# Patient Record
Sex: MALE | Race: WHITE | NOT HISPANIC OR LATINO | Employment: FULL TIME | URBAN - METROPOLITAN AREA
[De-identification: names, ages, dates, MRNs, and addresses within clinical notes are randomized per-mention and may not be internally consistent; named-entity substitution may affect disease eponyms.]

---

## 2017-01-16 ENCOUNTER — TRANSFERRED RECORDS (OUTPATIENT)
Dept: HEALTH INFORMATION MANAGEMENT | Facility: CLINIC | Age: 57
End: 2017-01-16

## 2017-03-20 ENCOUNTER — TELEPHONE (OUTPATIENT)
Dept: RHEUMATOLOGY | Facility: CLINIC | Age: 57
End: 2017-03-20

## 2017-03-20 NOTE — TELEPHONE ENCOUNTER
Patient wants to know why there is an appt. Scheduled for him today? He did not know about it and he missed it. Please advise. Ok to leave a message.  Call cell if calling today after 2:30.  Patient was told he did not need to come in every 3 mths. Just do labs.

## 2017-03-20 NOTE — TELEPHONE ENCOUNTER
Rheumatology Team:    Mr. Monroy did not come to his scheduled appointment today.  Please call Mr. Monroy to see if he would like to reschedule.      Toxicity monitoring labs are not up to date and should be done as soon as possible; orders are in the system and he may have them done at any [place] lab.  Please encourage him to do this soon; please offer to schedule the lab appointment.    Eduard Mazariegos MD  3/20/2017 4:57 PM

## 2017-03-21 DIAGNOSIS — M05.79 SEROPOSITIVE RHEUMATOID ARTHRITIS OF MULTIPLE SITES (H): ICD-10-CM

## 2017-03-21 DIAGNOSIS — N20.0 KIDNEY STONES: Primary | ICD-10-CM

## 2017-03-21 LAB
ALBUMIN SERPL-MCNC: 4.1 G/DL (ref 3.4–5)
ALBUMIN SERPL-MCNC: 4.2 G/DL (ref 3.4–5)
ALP SERPL-CCNC: 79 U/L (ref 40–150)
ALT SERPL W P-5'-P-CCNC: 28 U/L (ref 0–70)
ANION GAP SERPL CALCULATED.3IONS-SCNC: 9 MMOL/L (ref 3–14)
AST SERPL W P-5'-P-CCNC: 15 U/L (ref 0–45)
BASOPHILS # BLD AUTO: 0 10E9/L (ref 0–0.2)
BASOPHILS NFR BLD AUTO: 0.3 %
BILIRUB DIRECT SERPL-MCNC: <0.1 MG/DL (ref 0–0.2)
BILIRUB SERPL-MCNC: 0.4 MG/DL (ref 0.2–1.3)
BUN SERPL-MCNC: 15 MG/DL (ref 7–30)
CALCIUM SERPL-MCNC: 9.2 MG/DL (ref 8.5–10.1)
CHLORIDE SERPL-SCNC: 104 MMOL/L (ref 94–109)
CO2 SERPL-SCNC: 26 MMOL/L (ref 20–32)
CREAT SERPL-MCNC: 0.85 MG/DL (ref 0.66–1.25)
CREAT SERPL-MCNC: 0.86 MG/DL (ref 0.66–1.25)
CRP SERPL-MCNC: 3.5 MG/L (ref 0–8)
DIFFERENTIAL METHOD BLD: NORMAL
EOSINOPHIL # BLD AUTO: 0 10E9/L (ref 0–0.7)
EOSINOPHIL NFR BLD AUTO: 0.4 %
ERYTHROCYTE [DISTWIDTH] IN BLOOD BY AUTOMATED COUNT: 12 % (ref 10–15)
ERYTHROCYTE [SEDIMENTATION RATE] IN BLOOD BY WESTERGREN METHOD: 6 MM/H (ref 0–20)
GFR SERPL CREATININE-BSD FRML MDRD: ABNORMAL ML/MIN/1.7M2
GFR SERPL CREATININE-BSD FRML MDRD: NORMAL ML/MIN/1.7M2
GLUCOSE SERPL-MCNC: 103 MG/DL (ref 70–99)
HCT VFR BLD AUTO: 41.9 % (ref 40–53)
HGB BLD-MCNC: 14.7 G/DL (ref 13.3–17.7)
LYMPHOCYTES # BLD AUTO: 1.6 10E9/L (ref 0.8–5.3)
LYMPHOCYTES NFR BLD AUTO: 20.8 %
MCH RBC QN AUTO: 32.5 PG (ref 26.5–33)
MCHC RBC AUTO-ENTMCNC: 35.1 G/DL (ref 31.5–36.5)
MCV RBC AUTO: 93 FL (ref 78–100)
MONOCYTES # BLD AUTO: 0.5 10E9/L (ref 0–1.3)
MONOCYTES NFR BLD AUTO: 6.4 %
NEUTROPHILS # BLD AUTO: 5.6 10E9/L (ref 1.6–8.3)
NEUTROPHILS NFR BLD AUTO: 72.1 %
PHOSPHATE SERPL-MCNC: 2.6 MG/DL (ref 2.5–4.5)
PLATELET # BLD AUTO: 225 10E9/L (ref 150–450)
POTASSIUM SERPL-SCNC: 4 MMOL/L (ref 3.4–5.3)
PROT SERPL-MCNC: 6.9 G/DL (ref 6.8–8.8)
RBC # BLD AUTO: 4.53 10E12/L (ref 4.4–5.9)
SODIUM SERPL-SCNC: 139 MMOL/L (ref 133–144)
WBC # BLD AUTO: 7.8 10E9/L (ref 4–11)

## 2017-03-21 PROCEDURE — 80069 RENAL FUNCTION PANEL: CPT | Performed by: PHYSICIAN ASSISTANT

## 2017-03-21 PROCEDURE — 82565 ASSAY OF CREATININE: CPT | Performed by: INTERNAL MEDICINE

## 2017-03-21 PROCEDURE — 85025 COMPLETE CBC W/AUTO DIFF WBC: CPT | Performed by: INTERNAL MEDICINE

## 2017-03-21 PROCEDURE — 86140 C-REACTIVE PROTEIN: CPT | Performed by: INTERNAL MEDICINE

## 2017-03-21 PROCEDURE — 85652 RBC SED RATE AUTOMATED: CPT | Performed by: INTERNAL MEDICINE

## 2017-03-21 PROCEDURE — 36415 COLL VENOUS BLD VENIPUNCTURE: CPT | Performed by: INTERNAL MEDICINE

## 2017-03-21 PROCEDURE — 80076 HEPATIC FUNCTION PANEL: CPT | Performed by: INTERNAL MEDICINE

## 2017-03-21 NOTE — TELEPHONE ENCOUNTER
Patient wants to know why there is an appt. Scheduled for him today? He did not know about it and he missed it. Please advise. Ok to leave a message.  Call cell if calling today after 2:30. Patient was told he did not need to come in every 3 mths. Just do labs.    RN attempted to reach patient in regards to no show appointment.  Appointment was made at his last OV for his f/u.  NO VM on patient phone.      Please attempt again later.   Margy Kamara RN

## 2017-03-21 NOTE — TELEPHONE ENCOUNTER
Copying into encounter with same concern/topic. Closing encounter.  See 3/20/17 encounter.   Margy Kamara RN

## 2017-03-22 DIAGNOSIS — M05.79 SEROPOSITIVE RHEUMATOID ARTHRITIS OF MULTIPLE SITES (H): ICD-10-CM

## 2017-03-22 RX ORDER — LEFLUNOMIDE 20 MG/1
20 TABLET ORAL DAILY
Qty: 90 TABLET | Refills: 0 | Status: SHIPPED | OUTPATIENT
Start: 2017-03-22 | End: 2017-07-10

## 2017-03-22 RX ORDER — PREDNISONE 5 MG/1
5 TABLET ORAL DAILY
Qty: 90 TABLET | Refills: 0 | Status: SHIPPED | OUTPATIENT
Start: 2017-03-22 | End: 2017-07-10

## 2017-03-22 NOTE — TELEPHONE ENCOUNTER
Spoke with patient, per Dr. Mazariegos his prescription for Leflunomide and prednisone has been refilled for 90 days but patient will need to make an appointment within the next 90 days. Patient stated he will make an appointment soon. Shonna Kenny CMA

## 2017-06-15 ENCOUNTER — TELEPHONE (OUTPATIENT)
Dept: RHEUMATOLOGY | Facility: CLINIC | Age: 57
End: 2017-06-15

## 2017-06-15 DIAGNOSIS — Z79.899 HIGH RISK MEDICATIONS (NOT ANTICOAGULANTS) LONG-TERM USE: ICD-10-CM

## 2017-06-15 DIAGNOSIS — M05.79 SEROPOSITIVE RHEUMATOID ARTHRITIS OF MULTIPLE SITES (H): Primary | ICD-10-CM

## 2017-06-15 NOTE — TELEPHONE ENCOUNTER
Reason for Call: Request for an order or referral:    Order or referral being requested: labs     Date needed: as soon as possible    Has the patient been seen by the PCP for this problem? YES    Additional comments:     Phone number Patient can be reached at:  Home number on file [phone] (home)    Best Time:      Can we leave a detailed message on this number?  YES    Call taken on 6/15/2017 at 11:37 AM by Rosie Lange

## 2017-06-26 DIAGNOSIS — Z79.899 HIGH RISK MEDICATIONS (NOT ANTICOAGULANTS) LONG-TERM USE: ICD-10-CM

## 2017-06-26 DIAGNOSIS — M05.79 SEROPOSITIVE RHEUMATOID ARTHRITIS OF MULTIPLE SITES (H): ICD-10-CM

## 2017-06-26 LAB
ALBUMIN SERPL-MCNC: 3.9 G/DL (ref 3.4–5)
ALP SERPL-CCNC: 86 U/L (ref 40–150)
ALT SERPL W P-5'-P-CCNC: 28 U/L (ref 0–70)
AST SERPL W P-5'-P-CCNC: 15 U/L (ref 0–45)
BASOPHILS # BLD AUTO: 0 10E9/L (ref 0–0.2)
BASOPHILS NFR BLD AUTO: 0.3 %
BILIRUB DIRECT SERPL-MCNC: <0.1 MG/DL (ref 0–0.2)
BILIRUB SERPL-MCNC: 0.4 MG/DL (ref 0.2–1.3)
CREAT SERPL-MCNC: 0.87 MG/DL (ref 0.66–1.25)
CRP SERPL-MCNC: 10.3 MG/L (ref 0–8)
DIFFERENTIAL METHOD BLD: NORMAL
EOSINOPHIL # BLD AUTO: 0.1 10E9/L (ref 0–0.7)
EOSINOPHIL NFR BLD AUTO: 0.7 %
ERYTHROCYTE [DISTWIDTH] IN BLOOD BY AUTOMATED COUNT: 12 % (ref 10–15)
ERYTHROCYTE [SEDIMENTATION RATE] IN BLOOD BY WESTERGREN METHOD: 6 MM/H (ref 0–20)
GFR SERPL CREATININE-BSD FRML MDRD: NORMAL ML/MIN/1.7M2
HCT VFR BLD AUTO: 42.5 % (ref 40–53)
HGB BLD-MCNC: 14.8 G/DL (ref 13.3–17.7)
LYMPHOCYTES # BLD AUTO: 1.7 10E9/L (ref 0.8–5.3)
LYMPHOCYTES NFR BLD AUTO: 23.8 %
MCH RBC QN AUTO: 32.4 PG (ref 26.5–33)
MCHC RBC AUTO-ENTMCNC: 34.8 G/DL (ref 31.5–36.5)
MCV RBC AUTO: 93 FL (ref 78–100)
MONOCYTES # BLD AUTO: 0.6 10E9/L (ref 0–1.3)
MONOCYTES NFR BLD AUTO: 8.8 %
NEUTROPHILS # BLD AUTO: 4.9 10E9/L (ref 1.6–8.3)
NEUTROPHILS NFR BLD AUTO: 66.4 %
PLATELET # BLD AUTO: 206 10E9/L (ref 150–450)
PROT SERPL-MCNC: 7.1 G/DL (ref 6.8–8.8)
RBC # BLD AUTO: 4.57 10E12/L (ref 4.4–5.9)
WBC # BLD AUTO: 7.3 10E9/L (ref 4–11)

## 2017-06-26 PROCEDURE — 85652 RBC SED RATE AUTOMATED: CPT | Performed by: INTERNAL MEDICINE

## 2017-06-26 PROCEDURE — 82565 ASSAY OF CREATININE: CPT | Performed by: INTERNAL MEDICINE

## 2017-06-26 PROCEDURE — 36415 COLL VENOUS BLD VENIPUNCTURE: CPT | Performed by: INTERNAL MEDICINE

## 2017-06-26 PROCEDURE — 85025 COMPLETE CBC W/AUTO DIFF WBC: CPT | Performed by: INTERNAL MEDICINE

## 2017-06-26 PROCEDURE — 80076 HEPATIC FUNCTION PANEL: CPT | Performed by: INTERNAL MEDICINE

## 2017-06-26 PROCEDURE — 86140 C-REACTIVE PROTEIN: CPT | Performed by: INTERNAL MEDICINE

## 2017-07-10 ENCOUNTER — OFFICE VISIT (OUTPATIENT)
Dept: RHEUMATOLOGY | Facility: CLINIC | Age: 57
End: 2017-07-10
Payer: COMMERCIAL

## 2017-07-10 VITALS
HEART RATE: 58 BPM | WEIGHT: 170 LBS | SYSTOLIC BLOOD PRESSURE: 142 MMHG | HEIGHT: 71 IN | BODY MASS INDEX: 23.8 KG/M2 | DIASTOLIC BLOOD PRESSURE: 84 MMHG | OXYGEN SATURATION: 99 % | TEMPERATURE: 97.9 F

## 2017-07-10 DIAGNOSIS — Z79.899 HIGH RISK MEDICATION USE: ICD-10-CM

## 2017-07-10 DIAGNOSIS — R73.01 IMPAIRED FASTING GLUCOSE: ICD-10-CM

## 2017-07-10 DIAGNOSIS — M05.79 SEROPOSITIVE RHEUMATOID ARTHRITIS OF MULTIPLE SITES (H): Primary | ICD-10-CM

## 2017-07-10 DIAGNOSIS — E78.5 HYPERLIPIDEMIA WITH TARGET LDL LESS THAN 130: ICD-10-CM

## 2017-07-10 DIAGNOSIS — I10 HYPERTENSION GOAL BP (BLOOD PRESSURE) < 140/90: ICD-10-CM

## 2017-07-10 PROCEDURE — 99213 OFFICE O/P EST LOW 20 MIN: CPT | Performed by: INTERNAL MEDICINE

## 2017-07-10 RX ORDER — PREDNISONE 5 MG/1
5 TABLET ORAL DAILY
Qty: 90 TABLET | Refills: 0 | Status: SHIPPED | OUTPATIENT
Start: 2017-07-10 | End: 2018-02-05

## 2017-07-10 RX ORDER — LEFLUNOMIDE 20 MG/1
20 TABLET ORAL DAILY
Qty: 90 TABLET | Refills: 0 | Status: SHIPPED | OUTPATIENT
Start: 2017-07-10 | End: 2017-10-19

## 2017-07-10 NOTE — NURSING NOTE
Blood pressure rechecked after visit     142/84  Shonna Kenny CMA  7/10/2017 8:09 AM

## 2017-07-10 NOTE — Clinical Note
Dr. Jones,  RA is doing okay, but we are adjusting meds to try to taper off prednisone in the future.  Blood pressure is still high but he says it is normal at work; he is going to buy a cuff to measure it at home and keep a log.  He has labs for me in October and was wanting to know if you could add any needed labs to that blood draw for his Nov appointment with you.  Thanks! Eduard

## 2017-07-10 NOTE — PATIENT INSTRUCTIONS
Dr. Mazariegos s Rheumatology Clinics  Locations Clinic Hours Telephone Number   [facility]  [address]  [place] [id number]     Wednesday: 7:20AM - 4:00PM  Thursday:     7:20AM - 4:00PM     Friday:          7:20AM - 11:00AM       To schedule an appointment with  Dr. Mazariegos,  please contact  Specialty [phone]       [facility]  [address]  [place] [id number]       Monday:       7:20AM - 4:00PM      [facility]ier[facility]  [address]  [place] [address]       Tuesday:      7:20AM - 4:00PM          Thank you!    Shonna Kenny CMA

## 2017-07-10 NOTE — PROGRESS NOTES
Rheumatology Clinic Visit      Bora Monroy MRN# [id number]   YOB: 1960 Age: 57 year old      Date of visit: 7/10/17   PCP: Dr. Yakov Jones     Chief Complaint   Rheumatoid arthritis  Patient presents with:  Arthritis: RA, patient states he feels good. Concerned about CRP. Shoulders have been a little sore      Assessment and Plan     1. Seropositive rheumatoid arthritis (RF negative, CCP positive): Currently on leflunomide 20 mg every other day and prednisone 5 mg every other day. He was doing well with this dose but is having some bilateral shoulder stiffness. We again discussed treatment options and I encouraged him to take leflunomide every day with the goal of controlling his symptoms and tapering him off of prednisone. Today, he said that he agreed with this plan and will increase leflunomide to daily. w   - Change leflunomide to 20 mg daily   - Continue prednisone 5mg every other day  - Labs in 3 months and 2-3 days prior to the next rheumatology clinic visit: CBC, Creatinine, Hepatic Panel, ESR, CRP    2. Hypertersion: Blood pressure checked this clinic visit and it was elevated; he reports having normal blood pressure when he checks it at work. I advised him to buy his own blood pressure cuff and to keep a blood pressure log at home that he can bring in with him to his visit with Dr. Jones, and that he could bring his own cuff at that time to ensure it is measuring correctly.    3. Bone Health: 2015 vitamin D level normal    4. Vaccinations:   - Influenza: up to date  - [id number]: refused  - Shingles vaccine: refused    Mr. Monroy verbalized agreement with and understanding of the rational for the diagnosis and treatment plan.  All questions were answered to best of my ability and the patient's satisfaction. Mr. Monroy was advised to contact the clinic with any questions that may arise after the clinic visit.      Thank you for involving me in the care of the patient    Return to  clinic: 6 months, at the patient's request    HPI   Bora SAUD Porfirio is a 57 year old male hyperlipidemia, and seropositive rheumatoid arthritis who is here for follow-up of RA.     Today, Mr. Monroy reports that he is doing well with only occasional shoulder stiffness that quickly resolves when he moves his arms around. He continues to use leflunomide 20 mg every other day and prednisone 5 mg every other day. He says that because his CRP is elevated and his shoulders are bothering him so he is thinking about increasing leflunomide to daily.     Denies fevers, chills, nausea, vomiting, constipation, diarrhea. No abdominal pain. No chest pain/pressure, palpitations, or shortness of breath. No oral or nasal sores. No neck pain. No rash. No LE swelling.  No photosensitivity or photophobia.  No sicca symptoms.  No eye pain or redness.     ROS   GEN: No fevers, chills, night sweats, or weight change  SKIN: No itching, rashes, sores  HEENT:  No oral or nasal ulcers.  CV: No chest pain, pressure, palpitations, or dyspnea on exertion.  PULM: No SOB, wheeze, cough.  GI: No nausea, vomiting, constipation, diarrhea. No blood in stool. No abdominal pain.  : Recently had a kidney stone  MSK: See HPI.  NEURO: No numbness, tingling, or weakness.  EXT: No LE swelling    Active Problem List     Patient Active Problem List   Diagnosis     Presbyopias     Hyperopia     Hyperlipidemia with target LDL less than 130     Impaired fasting glucose     High risk medications (not anticoagulants) long-term use     Advanced directives, counseling/discussion     Seropositive rheumatoid arthritis of multiple sites (HCC)     Hypertension goal BP (blood pressure) < 140/90     Kidney stone     Past Medical History     Past Medical History:   Diagnosis Date     Hyperlipidemia LDL goal < 130      Hypertension goal BP (blood pressure) < 140/90 11/17/2015    NL stress test 4/1/16, med started 11/16     Impaired fasting glucose      Kidney stone  12/8/2016     Seropositive rheumatoid arthritis of multiple sites (H) 11/17/2015     Past Surgical History     Past Surgical History:   Procedure Laterality Date     COLONOSCOPY  11/5/2010    COLONOSCOPY performed by FERMIN MCCLELLAND at [facility]     TONSILLECTOMY  1964    AGE 4     Allergy   No Known Allergies  Current Medication List     Current Outpatient Prescriptions   Medication Sig     leflunomide (ARAVA) 20 MG tablet Take 1 tablet (20 mg) by mouth daily     predniSONE (DELTASONE) 5 MG tablet Take 1 tablet (5 mg) by mouth daily     ibuprofen (ADVIL/MOTRIN) 600 MG tablet Take 1 tablet (600 mg) by mouth every 4 hours as needed for moderate pain     tamsulosin (FLOMAX) 0.4 MG capsule Take 1 capsule (0.4 mg) by mouth daily     triamcinolone (KENALOG) 0.1 % cream Apply sparingly to affected area two to three times daily as needed for hand rash     amLODIPine (NORVASC) 5 MG tablet Take 1 tablet (5 mg) by mouth daily     KRILL OIL PO Take 1 tablet by mouth daily     VITAMIN E PO      cinnamon 500 MG TABS Take 2 tablets by mouth daily.     psyllium (METAMUCIL) 30.9 % POWD Take 1 tsp by mouth 2 times daily.     VITAMIN C OR None Entered     MULTI-VITAMIN OR TABS 1 TABLET DAILY     FISH OIL 1000 MG OR CAPS 1 tablet daily     [DISCONTINUED] leflunomide (ARAVA) 20 MG tablet Take 1 tablet (20 mg) by mouth daily     oxyCODONE-acetaminophen (PERCOCET) 5-325 MG per tablet Take 1 tablet by mouth every 4 hours as needed for pain     No current facility-administered medications for this visit.          Social History     See HPI    Family History     Family History   Problem Relation Age of Onset     Lipids Sister      DIABETES Sister      Lipids Father      Hypertension Father      CEREBROVASCULAR DISEASE Maternal Aunt      CANCER No family hx of        Physical Exam     Temp Readings from Last 3 Encounters:   07/10/17 97.9  F (36.6  C) (Oral)   11/18/16 97.3  F (36.3  C) (Oral)   11/07/16 97.8  F (36.6  C) (Oral)     BP  "Readings from Last 5 Encounters:   07/10/17 153/84   12/19/16 163/85   11/18/16 157/87   11/07/16 145/81   10/20/16 134/60     Pulse Readings from Last 1 Encounters:   07/10/17 58     Resp Readings from Last 1 Encounters:   05/23/16 16     Estimated body mass index is 23.54 kg/(m^2) as calculated from the following:    Height as of this encounter: 1.81 m (5' 11.25\").    Weight as of this encounter: 77.1 kg (170 lb).    GEN: NAD  HEENT: MMM. No oral lesions. EOMI. Anicteric, noninjected sclera  CV: S1, S2. RRR. No m/r/g.  PULM: CTA bilaterally. No w/c.  MSK: Hands, wrists, and elbows without synovial swelling, increased warmth, tenderness to palpation, or overlying erythema.  Negative MCP squeeze.  Normal  strength. Knees, ankles, and feet without swelling, increased warmth, tenderness to palpation, or overlying erythema.    SKIN: No rash  EXT: No LE edema  PSYCH: Alert. Appropriate.    Labs   RF/CCP  Recent Labs   Lab Test  07/05/12   1247   CCPABY  24*   RHF  <7     SARATH/RNP/Sm/SSA/SSB  Recent Labs   Lab Test  07/05/12   1247   ALBERTO  <1.0 Interpretation:  Negative   ENASSA  0   ENASSB  0     ANCA  Recent Labs   Lab Test  07/05/12   1247   ANCA  <1:20 Reference range: <1:20 (Note) <1:20 INTERPRETIVE INFORMATION: Anti-Neutrophil Cyto Ab, IgG  Neutrophil Cytoplasmic Antibodies (C-ANCA = granular cytoplasmic staining, P-ANCA = perinuclear staining) are found in the serum of over 90 percent of patients with certain necrotizing systemic vasculitides, and usually in less than 5 percent of patients with collagen vascular disease or arthritis. Performed by [facility], [address] [place] [id number] [phone] [email or web address], Elizabeth Blandon MD, Lab. Director     CBC  Recent Labs   Lab Test  06/26/17   0900  03/21/17   1453  12/19/16   1439   WBC  7.3  7.8  6.1   RBC  4.57  4.53  4.52   HGB  14.8  14.7  14.5   HCT  42.5  41.9  41.5   MCV  93  93  92   RDW  12.0  12.0  11.7   PLT  206  225  239   MCH  32.4  " 32.5  32.1   MCHC  34.8  35.1  34.9   NEUTROPHIL  66.4  72.1  62.0   LYMPH  23.8  20.8  28.5   MONOCYTE  8.8  6.4  8.0   EOSINOPHIL  0.7  0.4  0.8   BASOPHIL  0.3  0.3  0.7   ANEU  4.9  5.6  3.8   ALYM  1.7  1.6  1.8   MANDI  0.6  0.5  0.5   AEOS  0.1  0.0  0.1   ABAS  0.0  0.0  0.0     CMP  Recent Labs   Lab Test  06/26/17   0900  03/21/17   1453  12/19/16   1439  11/22/16   1456  11/18/16   0819  08/25/16   1453  05/23/16   1605 03/28/16   11/17/15   0746   06/30/15   1654  02/23/15   1327   11/08/13   1401   NA   --   139   --    --   137   --    --    --    --   139   --   139  137   --   138   POTASSIUM   --   4.0   --    --   4.1   --    --    --    --   4.0   --   4.3  4.1   --   3.9   CHLORIDE   --   104   --    --   104   --    --    --    --   106   --   103  104   --   101   CO2   --   26   --    --   27   --    --    --    --   28   --   29  28   --   26   ANIONGAP   --   9   --    --   6   --    --    --    --   5   --   7  5   --   11   GLC   --   103*   --    --   107*   --    --   109*   --   110*   --   102*  104*   < >  96   BUN   --   15   --    --   15   --    --    --    --   12   --   17  15   --   12   CR  0.87  0.85  0.86  0.93  1.04  0.87  0.95  0.88  0.88   < >  0.88   < >  0.93  0.97   < >  0.67   GFRESTIMATED  >90  Non  GFR Calc    >90  Non  GFR Calc    >90  Non  GFR Calc    84  74  >90  Non  GFR Calc    81  89   --    < >  90   < >  84  80   < >  >90   GFRESTBLACK  >90   GFR Calc    >90   GFR Calc    >90   GFR Calc    >90   GFR Calc    89  >90   GFR Calc    >90   GFR Calc    >90   GFR Calc     --    < >  >90   GFR Calc     < >  >90   GFR Calc    >90   GFR Calc     < >  >90   MOLINA   --   9.2   --    --   9.0   --    --    --    --   8.9   --   9.1  9.3   --   9.4    BILITOTAL  0.4  0.4  0.3  0.4   --   0.5  0.4   --    < >   --    < >  0.4  0.3   --    --    ALBUMIN  3.9  4.1  4.2  4.1  4.1   --   4.1  4.0   --    < >   --    < >  3.9  4.0   --    --    PROTTOTAL  7.1  6.9  7.1  6.8   --   6.9  6.8   --    < >   --    < >  7.4  7.6   --    --    ALKPHOS  86  79  75  80   --   83  87   --    < >   --    < >  90  99   --    --    AST  15  15  12  16   --   21  17   --    < >   --    < >  22  18   < >   --    ALT  28  28  24  27   --   36  25   --    < >   --    < >  35  30   < >   --     < > = values in this interval not displayed.     HgA1c  Recent Labs   Lab Test  11/18/16   0819  11/17/15   0746  11/13/14   0930   A1C  5.3  5.5  5.3     Calcium/VitaminD  Recent Labs   Lab Test  03/21/17   1453  11/18/16   0819  11/17/15   0746  10/12/15   1439   MOLINA  9.2  9.0  8.9   --    VITDT   --    --    --   46     ESR/CRP  Recent Labs   Lab Test  06/26/17   0900  03/21/17   1453  12/19/16   1439   SED  6  6  6   CRP  10.3*  3.5  <2.9     CK/Aldolase  Recent Labs   Lab Test  07/05/12   1247  06/13/12   1123   CKT  57  64   ALDOLASE  4.2   --      TSH/T4  Recent Labs   Lab Test  06/13/12   1123   TSH  1.37     Hepatitis C  Recent Labs   Lab Test  08/07/12   1439   HCVAB  Negative     Tuberculosis Screening  Recent Labs   Lab Test  08/07/12   1440   TBRSLT  Negative   TBAGN  0.11     Immunization History     Immunization History   Administered Date(s) Administered     Influenza (IIV3) 10/29/2007, 10/13/2012, 10/15/2013     Influenza Vaccine IM 3yrs+ 4 Valent IIV4 10/30/2014, 10/29/2015, 10/15/2016     Pneumococcal 23 valent 10/31/2002     TD (ADULT, 7+) 09/03/2004     TDAP Vaccine (Boostrix) 11/13/2014          Chart documentation done in part with Dragon Voice recognition Software. Although reviewed after completion, some word and grammatical error may remain.    Eduard Mazariegos MD      Addendum:  I will order some extra labs for him in 3 months as well (prior to his physical with me in  November).  Yakov Jones MD

## 2017-07-10 NOTE — NURSING NOTE
"Chief Complaint   Patient presents with     Arthritis     RA, patient states he feels good. Concerned about CRP. Shoulders have been a little sore       Initial /84 (BP Location: Left arm, Patient Position: Chair, Cuff Size: Adult Regular)  Pulse 58  Temp 97.9  F (36.6  C) (Oral)  Ht 1.81 m (5' 11.25\")  Wt 77.1 kg (170 lb)  SpO2 99%  BMI 23.54 kg/m2 Estimated body mass index is 23.54 kg/(m^2) as calculated from the following:    Height as of this encounter: 1.81 m (5' 11.25\").    Weight as of this encounter: 77.1 kg (170 lb).  BP completed using cuff size: regular         RAPID3 (0-30) Cumulative Score  1.0          RAPID3 Weighted Score (divide #4 by 3 and that is the weighted score)  0.33         "

## 2017-10-05 ENCOUNTER — TELEPHONE (OUTPATIENT)
Dept: FAMILY MEDICINE | Facility: CLINIC | Age: 57
End: 2017-10-05

## 2017-10-05 NOTE — TELEPHONE ENCOUNTER
Reason for Call:  Other     Detailed comments: patient would like a call back he would like to know if PCP is ok with him getting his fasting labs done in [place] October his yearly physical is in November.  please call to discuss     Phone Number Patient can be reached at: Home number on file [phone] (home)    Best Time: any    Can we leave a detailed message on this number? YES    Call taken on 10/5/2017 at 9:20 AM by Angelica Soliz

## 2017-10-05 NOTE — TELEPHONE ENCOUNTER
Orders are place.    Called patient at [phone] (home). Left message on voicemail to return phone call to triage.  Tatum Mcduffie RN CPC Triage.

## 2017-10-06 NOTE — TELEPHONE ENCOUNTER
Attempt # 1  Called patient at home number.  Was call answered?  Yes, relayed below information - patient thankful to only have one blood draw.    Sis Dahl RN  [facility]

## 2017-10-17 DIAGNOSIS — M05.79 SEROPOSITIVE RHEUMATOID ARTHRITIS OF MULTIPLE SITES (H): ICD-10-CM

## 2017-10-17 DIAGNOSIS — R73.01 IMPAIRED FASTING GLUCOSE: ICD-10-CM

## 2017-10-17 DIAGNOSIS — Z79.899 HIGH RISK MEDICATION USE: ICD-10-CM

## 2017-10-17 DIAGNOSIS — E78.5 HYPERLIPIDEMIA WITH TARGET LDL LESS THAN 130: ICD-10-CM

## 2017-10-17 LAB
ALBUMIN SERPL-MCNC: 4.1 G/DL (ref 3.4–5)
ALP SERPL-CCNC: 80 U/L (ref 40–150)
ALT SERPL W P-5'-P-CCNC: 27 U/L (ref 0–70)
ANION GAP SERPL CALCULATED.3IONS-SCNC: 3 MMOL/L (ref 3–14)
AST SERPL W P-5'-P-CCNC: 14 U/L (ref 0–45)
BASOPHILS # BLD AUTO: 0 10E9/L (ref 0–0.2)
BASOPHILS NFR BLD AUTO: 0.4 %
BILIRUB SERPL-MCNC: 0.7 MG/DL (ref 0.2–1.3)
BUN SERPL-MCNC: 12 MG/DL (ref 7–30)
CALCIUM SERPL-MCNC: 9 MG/DL (ref 8.5–10.1)
CHLORIDE SERPL-SCNC: 103 MMOL/L (ref 94–109)
CHOLEST SERPL-MCNC: 229 MG/DL
CO2 SERPL-SCNC: 31 MMOL/L (ref 20–32)
CREAT SERPL-MCNC: 0.96 MG/DL (ref 0.66–1.25)
CRP SERPL-MCNC: <2.9 MG/L (ref 0–8)
DIFFERENTIAL METHOD BLD: NORMAL
EOSINOPHIL # BLD AUTO: 0 10E9/L (ref 0–0.7)
EOSINOPHIL NFR BLD AUTO: 0.6 %
ERYTHROCYTE [DISTWIDTH] IN BLOOD BY AUTOMATED COUNT: 12.2 % (ref 10–15)
ERYTHROCYTE [SEDIMENTATION RATE] IN BLOOD BY WESTERGREN METHOD: 4 MM/H (ref 0–20)
GFR SERPL CREATININE-BSD FRML MDRD: 80 ML/MIN/1.7M2
GLUCOSE SERPL-MCNC: 106 MG/DL (ref 70–99)
HBA1C MFR BLD: 5.2 % (ref 4.3–6)
HCT VFR BLD AUTO: 42.1 % (ref 40–53)
HDLC SERPL-MCNC: 51 MG/DL
HGB BLD-MCNC: 14.8 G/DL (ref 13.3–17.7)
LDLC SERPL CALC-MCNC: 157 MG/DL
LYMPHOCYTES # BLD AUTO: 1.6 10E9/L (ref 0.8–5.3)
LYMPHOCYTES NFR BLD AUTO: 31.5 %
MCH RBC QN AUTO: 32.4 PG (ref 26.5–33)
MCHC RBC AUTO-ENTMCNC: 35.2 G/DL (ref 31.5–36.5)
MCV RBC AUTO: 92 FL (ref 78–100)
MONOCYTES # BLD AUTO: 0.5 10E9/L (ref 0–1.3)
MONOCYTES NFR BLD AUTO: 9.5 %
NEUTROPHILS # BLD AUTO: 3 10E9/L (ref 1.6–8.3)
NEUTROPHILS NFR BLD AUTO: 58 %
NONHDLC SERPL-MCNC: 178 MG/DL
PLATELET # BLD AUTO: 193 10E9/L (ref 150–450)
POTASSIUM SERPL-SCNC: 4.3 MMOL/L (ref 3.4–5.3)
PROT SERPL-MCNC: 7.1 G/DL (ref 6.8–8.8)
RBC # BLD AUTO: 4.57 10E12/L (ref 4.4–5.9)
SODIUM SERPL-SCNC: 137 MMOL/L (ref 133–144)
TRIGL SERPL-MCNC: 106 MG/DL
WBC # BLD AUTO: 5.2 10E9/L (ref 4–11)

## 2017-10-17 PROCEDURE — 36415 COLL VENOUS BLD VENIPUNCTURE: CPT | Performed by: INTERNAL MEDICINE

## 2017-10-17 PROCEDURE — 85652 RBC SED RATE AUTOMATED: CPT | Performed by: INTERNAL MEDICINE

## 2017-10-17 PROCEDURE — 85025 COMPLETE CBC W/AUTO DIFF WBC: CPT | Performed by: INTERNAL MEDICINE

## 2017-10-17 PROCEDURE — 86140 C-REACTIVE PROTEIN: CPT | Performed by: INTERNAL MEDICINE

## 2017-10-17 PROCEDURE — 83036 HEMOGLOBIN GLYCOSYLATED A1C: CPT | Performed by: INTERNAL MEDICINE

## 2017-10-17 PROCEDURE — 80053 COMPREHEN METABOLIC PANEL: CPT | Performed by: INTERNAL MEDICINE

## 2017-10-17 PROCEDURE — 80061 LIPID PANEL: CPT | Performed by: INTERNAL MEDICINE

## 2017-10-17 NOTE — PROGRESS NOTES
"vufind message sent:  \"Mr. Monroy,    Labs show an elevated glucose at 106.  Other labs are normal.    Sincerely,  Eduard Mazariegos MD  10/17/2017 5:32 PM\""

## 2017-10-19 DIAGNOSIS — M05.79 SEROPOSITIVE RHEUMATOID ARTHRITIS OF MULTIPLE SITES (H): ICD-10-CM

## 2017-10-23 RX ORDER — LEFLUNOMIDE 20 MG/1
20 TABLET ORAL DAILY
Qty: 90 TABLET | Refills: 0 | Status: SHIPPED | OUTPATIENT
Start: 2017-10-23 | End: 2018-01-17

## 2017-11-21 ENCOUNTER — OFFICE VISIT (OUTPATIENT)
Dept: FAMILY MEDICINE | Facility: CLINIC | Age: 57
End: 2017-11-21
Payer: COMMERCIAL

## 2017-11-21 VITALS
SYSTOLIC BLOOD PRESSURE: 138 MMHG | OXYGEN SATURATION: 98 % | HEIGHT: 71 IN | DIASTOLIC BLOOD PRESSURE: 73 MMHG | BODY MASS INDEX: 22.54 KG/M2 | TEMPERATURE: 98.3 F | WEIGHT: 161 LBS | HEART RATE: 64 BPM

## 2017-11-21 DIAGNOSIS — Z00.00 ROUTINE GENERAL MEDICAL EXAMINATION AT A HEALTH CARE FACILITY: Primary | ICD-10-CM

## 2017-11-21 DIAGNOSIS — Z79.899 HIGH RISK MEDICATIONS (NOT ANTICOAGULANTS) LONG-TERM USE: ICD-10-CM

## 2017-11-21 DIAGNOSIS — E78.5 HYPERLIPIDEMIA WITH TARGET LDL LESS THAN 130: ICD-10-CM

## 2017-11-21 DIAGNOSIS — N20.0 KIDNEY STONE: ICD-10-CM

## 2017-11-21 DIAGNOSIS — R73.01 IMPAIRED FASTING GLUCOSE: ICD-10-CM

## 2017-11-21 DIAGNOSIS — M05.79 SEROPOSITIVE RHEUMATOID ARTHRITIS OF MULTIPLE SITES (H): ICD-10-CM

## 2017-11-21 DIAGNOSIS — I10 HYPERTENSION GOAL BP (BLOOD PRESSURE) < 140/90: ICD-10-CM

## 2017-11-21 PROCEDURE — 99396 PREV VISIT EST AGE 40-64: CPT | Performed by: FAMILY MEDICINE

## 2017-11-21 RX ORDER — AMLODIPINE BESYLATE 5 MG/1
5 TABLET ORAL DAILY
Qty: 90 TABLET | Refills: 3 | Status: SHIPPED | OUTPATIENT
Start: 2017-11-21 | End: 2018-11-18

## 2017-11-21 NOTE — MR AVS SNAPSHOT
After Visit Summary   11/21/2017    Bora Monroy    MRN: [id number]           Patient Information     Date Of Birth          1960        Visit Information        Provider Department      11/21/2017 7:20 AM Yakov Jones MD [facility]        Today's Diagnoses     Routine general medical examination at a health care facility    -  1    Hypertension goal BP (blood pressure) < 140/90        Hyperlipidemia with target LDL less than 130        Seropositive rheumatoid arthritis of multiple sites (HCC)        Impaired fasting glucose        High risk medications (not anticoagulants) long-term use        Kidney stone          Care Instructions      Preventive Health Recommendations  Male Ages 50 - 64    Yearly exam:             See your health care provider every year in order to  o   Review health changes.   o   Discuss preventive care.    o   Review your medicines if your doctor has prescribed any.     Have a cholesterol test every 5 years, or more frequently if you are at risk for high cholesterol/heart disease.     Have a diabetes test (fasting glucose) every three years. If you are at risk for diabetes, you should have this test more often.     Have a colonoscopy at age 50, or have a yearly FIT test (stool test). These exams will check for colon cancer.      Talk with your health care provider about whether or not a prostate cancer screening test (PSA) is right for you.    You should be tested each year for STDs (sexually transmitted diseases), if you re at risk.     Shots: Get a flu shot each year. Get a tetanus shot every 10 years.     Nutrition:    Eat at least 5 servings of fruits and vegetables daily.     Eat whole-grain bread, whole-wheat pasta and brown rice instead of white grains and rice.     Talk to your provider about Calcium and Vitamin D.     Lifestyle    Exercise for at least 150 minutes a week (30 minutes a day, 5 days a week). This will help you control  your weight and prevent disease.     Limit alcohol to one drink per day.     No smoking.     Wear sunscreen to prevent skin cancer.     See your dentist every six months for an exam and cleaning.     See your eye doctor every 1 to 2 years.            Follow-ups after your visit        Follow-up notes from your care team     Return in about 1 year (around 11/21/2018).      Your next 10 appointments already scheduled     Jan 08, 2018  8:00 AM CST   Return Visit with Eduard Mazariegos MD   [facility] ([facility]    [address] [facility] [address] [phone]              Who to contact     If you have questions or need follow up information about today's clinic visit or your schedule please contact [facility] directly at [phone].  Normal or non-critical lab and imaging results will be communicated to you by MyChart, letter or phone within 4 business days after the clinic has received the results. If you do not hear from us within 7 days, please contact the clinic through [employer] or phone. If you have a critical or abnormal lab result, we will notify you by phone as soon as possible.  Submit refill requests through [employer] or call your pharmacy and they will forward the refill request to us. Please allow 3 business days for your refill to be completed.          Additional Information About Your Visit        [employer] Information     [employer] gives you secure access to your electronic health record. If you see a primary care provider, you can also send messages to your care team and make appointments. If you have questions, please call your primary care clinic.  If you do not have a primary care provider, please call [phone] and they will assist you.        Care EveryWhere ID     This is your Care EveryWhere ID. This could be used by other organizations to access your [place] medical records  [id number]        Your Vitals Were     Pulse  "Temperature Height Pulse Oximetry BMI (Body Mass Index)       64 98.3  F (36.8  C) (Oral) 5' 11\" (1.803 m) 98% 22.45 kg/m2        Blood Pressure from Last 3 Encounters:   11/21/17 138/73   07/10/17 142/84   12/19/16 163/85    Weight from Last 3 Encounters:   11/21/17 161 lb (73 kg)   07/10/17 170 lb (77.1 kg)   12/19/16 169 lb 9.6 oz (76.9 kg)              Today, you had the following     No orders found for display         Where to get your medicines      These medications were sent to [id number] - [place] - [address]  [address], [place]  [place] [address]     Phone:  [phone]     amLODIPine 5 MG tablet          Primary Care Provider Office Phone # Fax #    Yakov Jones [phone]       [address] [facility] [address]        Equal Access to Services     [facility]: Hadii aad ku hadasho Soomaali, waaxda luqadaha, qaybta kaalmada adeegyada, waxay idiin hayverónican dione aviles . So [facility] [phone].    ATENCIÓN: Si habla español, tiene a olmedo disposición servicios gratuitos de asistencia lingüística. Llame al [phone].    We comply with applicable federal civil rights laws and [place] laws. We do not discriminate on the basis of race, color, national origin, age, disability, sex, sexual orientation, or gender identity.            Thank you!     Thank you for choosing [facility]  for your care. Our goal is always to provide you with excellent care. Hearing back from our patients is one way we can continue to improve our services. Please take a few minutes to complete the written survey that you may receive in the mail after your visit with us. Thank you!             Your Updated Medication List - Protect others around you: Learn how to safely use, store and throw away your medicines at www.disposemymeds.org.          This list is accurate as of: 11/21/17  7:56 AM.  Always use your most recent med list.    "                Brand Name Dispense Instructions for use Diagnosis    amLODIPine 5 MG tablet    NORVASC    90 tablet    Take 1 tablet (5 mg) by mouth daily    Hypertension goal BP (blood pressure) < 140/90       cinnamon 500 MG Tabs      Take 1 tablet by mouth daily        fish oil-omega-3 fatty acids 1000 MG capsule      1 tablet daily        KRILL OIL PO      Take 1 tablet by mouth daily        leflunomide 20 MG tablet    ARAVA    90 tablet    Take 1 tablet (20 mg) by mouth daily    Seropositive rheumatoid arthritis of multiple sites (H)       METAMUCIL 30.9 % Powd   Generic drug:  psyllium      Take 1 tsp by mouth 2 times daily.        Multi-vitamin Tabs tablet   Generic drug:  multivitamin, therapeutic with minerals      1 TABLET DAILY        predniSONE 5 MG tablet    DELTASONE    90 tablet    Take 1 tablet (5 mg) by mouth daily    Seropositive rheumatoid arthritis of multiple sites (H)       triamcinolone 0.1 % cream    KENALOG    60 g    Apply sparingly to affected area two to three times daily as needed for hand rash    Eczema, unspecified type       VITAMIN C PO      None Entered        VITAMIN E PO

## 2017-11-21 NOTE — PROGRESS NOTES
SUBJECTIVE:   CC: oBra Monroy is an 57 year old male who presents for a preventative health visit and follow-up on baseline health conditions.     Physical   Annual:     Getting at least 3 servings of Calcium per day::  Yes    Bi-annual eye exam::  Yes    Dental care twice a year::  Yes    Sleep apnea or symptoms of sleep apnea::  None    Diet::  Regular (no restrictions)    Frequency of exercise::  6-7 days/week    Duration of exercise::  15-30 minutes    Taking medications regularly::  No    Barriers to taking medications::  None    Additional concerns today::  No    He sees Dr. Mazariegos of rheumatology on a regular basis for his rheumatoid arthritis. He is on medication as below for that.  He feels like it's fairly well controlled. He had tried going off prednisone completely, but his symptoms flared.  He did have a kidney stone earlier this year that he passed.        Today's PHQ-2 Score: PHQ-2 ( 1999 Pfizer) 11/21/2017   Q1: Little interest or pleasure in doing things 0   Q2: Feeling down, depressed or hopeless 0   PHQ-2 Score 0   Q1: Little interest or pleasure in doing things Not at all   Q2: Feeling down, depressed or hopeless Not at all   PHQ-2 Score 0       Abuse: Current or Past(Physical, Sexual or Emotional)- No  Do you feel safe in your environment - Yes    Social History   Substance Use Topics     Smoking status: Never Smoker     Smokeless tobacco: Never Used     Alcohol use Yes      Comment: rare     The patient does not drink >3 drinks per day nor >7 drinks per week.    Last PSA:   PSA   Date Value Ref Range Status   11/18/2016 1.36 0 - 4 ug/L Final     Comment:     Assay Method:  Chemiluminescence using Siemens Vista analyzer       Reviewed orders with patient. Reviewed health maintenance and updated orders accordingly - Yes  Patient Active Problem List   Diagnosis     Presbyopias     Hyperopia     Hyperlipidemia with target LDL less than 130     Impaired fasting glucose     High risk  medications (not anticoagulants) long-term use     Advanced directives, counseling/discussion     Seropositive rheumatoid arthritis of multiple sites (HCC)     Hypertension goal BP (blood pressure) < 140/90     Kidney stone     Past Surgical History:   Procedure Laterality Date     COLONOSCOPY  11/5/2010    COLONOSCOPY performed by FERMIN MCCLELLAND at [facility]     TONSILLECTOMY  1964    AGE 4       Social History   Substance Use Topics     Smoking status: Never Smoker     Smokeless tobacco: Never Used     Alcohol use Yes      Comment: rare     Family History   Problem Relation Age of Onset     Lipids Sister      DIABETES Sister      Lipids Father      Hypertension Father      CEREBROVASCULAR DISEASE Maternal Aunt      CANCER No family hx of          Current Outpatient Prescriptions   Medication Sig Dispense Refill     amLODIPine (NORVASC) 5 MG tablet Take 1 tablet (5 mg) by mouth daily 90 tablet 3     leflunomide (ARAVA) 20 MG tablet Take 1 tablet (20 mg) by mouth daily 90 tablet 0     predniSONE (DELTASONE) 5 MG tablet Take 1 tablet (5 mg) by mouth daily 90 tablet 0     triamcinolone (KENALOG) 0.1 % cream Apply sparingly to affected area two to three times daily as needed for hand rash 60 g 2     KRILL OIL PO Take 1 tablet by mouth daily       VITAMIN E PO        cinnamon 500 MG TABS Take 1 tablet by mouth daily        psyllium (METAMUCIL) 30.9 % POWD Take 1 tsp by mouth 2 times daily.       VITAMIN C OR None Entered       MULTI-VITAMIN OR TABS 1 TABLET DAILY       FISH OIL 1000 MG OR CAPS 1 tablet daily       [DISCONTINUED] amLODIPine (NORVASC) 5 MG tablet Take 1 tablet (5 mg) by mouth daily 90 tablet 3     No Known Allergies      Reviewed and updated as needed this visit by clinical staffTobacco  Allergies  Meds  Med Hx  Surg Hx  Fam Hx  Soc Hx        Reviewed and updated as needed this visit by Provider            Review of Systems  CONSTITUTIONAL: he's lost about 10 pounds in the last year through diet  "and exercise  I: NEGATIVE for worrisome rashes, moles or lesions  E: NEGATIVE for vision changes or irritation  ENT: NEGATIVE for ear, mouth and throat problems  R: NEGATIVE for significant cough or SOB  CV: NEGATIVE for chest pain, palpitations or peripheral edema  GI: NEGATIVE for nausea, abdominal pain, heartburn, or change in bowel habits   male:  He urinates frequently now because he is drinking a lot of fluid to try to prevent kidney stones  MUSCULOSKELETAL: see above  N: NEGATIVE for weakness, dizziness or paresthesias  P: NEGATIVE for changes in mood or affect    OBJECTIVE:   /73 (BP Location: Right arm, Patient Position: Chair, Cuff Size: Adult Regular)  Pulse 64  Temp 98.3  F (36.8  C) (Oral)  Ht 5' 11\" (1.803 m)  Wt 161 lb (73 kg)  SpO2 98%  BMI 22.45 kg/m2    Physical Exam  GENERAL: healthy, alert and no distress  EYES: Eyes grossly normal to inspection, PERRL and conjunctivae and sclerae normal  HENT: ear canals and TM's normal, nose and mouth without ulcers or lesions  NECK: no adenopathy, no asymmetry, masses, or scars and thyroid normal to palpation  RESP: lungs clear to auscultation - no rales, rhonchi or wheezes  CV: regular rate and rhythm, normal S1 S2, no S3 or S4, no murmur, click or rub, no peripheral edema and peripheral pulses strong  ABDOMEN: soft, nontender, no hepatosplenomegaly, no masses   RECTAL: normal sphincter tone, no rectal masses, prostate normal size, smooth, nontender without nodules or masses  MS: no gross musculoskeletal defects noted, no edema  SKIN: no suspicious lesions or rashes  NEURO: Normal strength and tone, mentation intact and speech normal  PSYCH: mentation appears normal, affect normal/bright    Orders Only on 10/17/2017   Component Date Value Ref Range Status     Hemoglobin A1C 10/17/2017 5.2  4.3 - 6.0 % Final     Cholesterol 10/17/2017 229* <200 mg/dL Final    Desirable:       <200 mg/dl     Triglycerides 10/17/2017 106  <150 mg/dL Final    " Fasting specimen     HDL Cholesterol 10/17/2017 51  >39 mg/dL Final     LDL Cholesterol Calculated 10/17/2017 157* <100 mg/dL Final    Comment: Above desirable:  100-129 mg/dl  Borderline High:  130-159 mg/dL  High:             160-189 mg/dL  Very high:       >189 mg/dl       Non HDL Cholesterol 10/17/2017 178* <130 mg/dL Final    Comment: Above Desirable:  130-159 mg/dl  Borderline high:  160-189 mg/dl  High:             190-219 mg/dl  Very high:       >219 mg/dl       WBC 10/17/2017 5.2  4.0 - 11.0 10e9/L Final     RBC Count 10/17/2017 4.57  4.4 - 5.9 10e12/L Final     Hemoglobin 10/17/2017 14.8  13.3 - 17.7 g/dL Final     Hematocrit 10/17/2017 42.1  40.0 - 53.0 % Final     MCV 10/17/2017 92  78 - 100 fl Final     MCH 10/17/2017 32.4  26.5 - 33.0 pg Final     MCHC 10/17/2017 35.2  31.5 - 36.5 g/dL Final     RDW 10/17/2017 12.2  10.0 - 15.0 % Final     Platelet Count 10/17/2017 193  150 - 450 10e9/L Final     Diff Method 10/17/2017 Automated Method   Final     % Neutrophils 10/17/2017 58.0  % Final     % Lymphocytes 10/17/2017 31.5  % Final     % Monocytes 10/17/2017 9.5  % Final     % Eosinophils 10/17/2017 0.6  % Final     % Basophils 10/17/2017 0.4  % Final     Absolute Neutrophil 10/17/2017 3.0  1.6 - 8.3 10e9/L Final     Absolute Lymphocytes 10/17/2017 1.6  0.8 - 5.3 10e9/L Final     Absolute Monocytes 10/17/2017 0.5  0.0 - 1.3 10e9/L Final     Absolute Eosinophils 10/17/2017 0.0  0.0 - 0.7 10e9/L Final     Absolute Basophils 10/17/2017 0.0  0.0 - 0.2 10e9/L Final     Sodium 10/17/2017 137  133 - 144 mmol/L Final     Potassium 10/17/2017 4.3  3.4 - 5.3 mmol/L Final     Chloride 10/17/2017 103  94 - 109 mmol/L Final     Carbon Dioxide 10/17/2017 31  20 - 32 mmol/L Final     Anion Gap 10/17/2017 3  3 - 14 mmol/L Final     Glucose 10/17/2017 106* 70 - 99 mg/dL Final    Fasting specimen     Urea Nitrogen 10/17/2017 12  7 - 30 mg/dL Final     Creatinine 10/17/2017 0.96  0.66 - 1.25 mg/dL Final     GFR Estimate  10/17/2017 80  >60 mL/min/1.7m2 Final    Non  GFR Calc     GFR Estimate If Black 10/17/2017 >90  >60 mL/min/1.7m2 Final    African American GFR Calc     Calcium 10/17/2017 9.0  8.5 - 10.1 mg/dL Final     Bilirubin Total 10/17/2017 0.7  0.2 - 1.3 mg/dL Final     Albumin 10/17/2017 4.1  3.4 - 5.0 g/dL Final     Protein Total 10/17/2017 7.1  6.8 - 8.8 g/dL Final     Alkaline Phosphatase 10/17/2017 80  40 - 150 U/L Final     ALT 10/17/2017 27  0 - 70 U/L Final     AST 10/17/2017 14  0 - 45 U/L Final     CRP Inflammation 10/17/2017 <2.9  0.0 - 8.0 mg/L Final     Sed Rate 10/17/2017 4  0 - 20 mm/h Final         The 10-year ASCVD risk score (Faina HERBERT Jr, et al., 2013) is: 10%    Values used to calculate the score:      Age: 57 years      Sex: Male      Is Non- : No      Diabetic: No      Tobacco smoker: No      Systolic Blood Pressure: 138 mmHg      Is BP treated: Yes      HDL Cholesterol: 51 mg/dL      Total Cholesterol: 229 mg/dL      ASSESSMENT/PLAN:       ICD-10-CM    1. Routine general medical examination at a health care facility Z00.00    2. Hypertension goal BP (blood pressure) < 140/90 I10 amLODIPine (NORVASC) 5 MG tablet   3. Hyperlipidemia with target LDL less than 130 E78.5    4. Seropositive rheumatoid arthritis of multiple sites (HCC) M05.79    5. Impaired fasting glucose R73.01    6. High risk medications (not anticoagulants) long-term use Z79.899    7. Kidney stone N20.0       Blood pressure and other vital signs look fine   He will continue his same baseline meds   Discussed possibly going on a statin to lower lipids, especially given his 10 year  cardiovascular risk, but he does not want to do that   He plans to get a heart scan done in January              If his calcium score is significantly elevated, perhaps then he would be open to a statin   He'll continue routine rheumatology care and labs with them   Plan a recheck with me in one year, or sooner  "prn    COUNSELING:   Reviewed preventive health counseling, as reflected in patient instructions       Regular exercise       Healthy diet/nutrition       reports that he has never smoked. He has never used smokeless tobacco.      Estimated body mass index is 22.45 kg/(m^2) as calculated from the following:    Height as of this encounter: 5' 11\" (1.803 m).    Weight as of this encounter: 161 lb (73 kg).         Counseling Resources:  ATP IV Guidelines  Pooled Cohorts Equation Calculator  FRAX Risk Assessment  ICSI Preventive Guidelines  Dietary Guidelines for Americans, 2010  USDA's MyPlate  ASA Prophylaxis  Lung CA Screening    Yakov Jones MD  [facility]    Answers for HPI/ROS submitted by the patient on 11/21/2017   PHQ-2 Score: 0    "

## 2018-01-17 DIAGNOSIS — M05.79 SEROPOSITIVE RHEUMATOID ARTHRITIS OF MULTIPLE SITES (H): ICD-10-CM

## 2018-01-17 NOTE — TELEPHONE ENCOUNTER
Leflunomide      Last Written Prescription Date:  10/24/17  Last Fill Quantity: 90,   # refills: 0  Last Office Visit: 11/21/17  Future Office visit:    Next 5 appointments (look out 90 days)     Feb 05, 2018  8:40 AM CST   Return Visit with Eduard Mazariegos MD   [facility] [place] [facility]    [address]  [place] [address]   [phone]                   Routing refill request to provider for review/approval because:  Drug not on the [facility], [facility] or [facility] refill protocol or controlled substance

## 2018-01-18 NOTE — TELEPHONE ENCOUNTER
Medication:   Leflunomide 20 mg     Prescribed:   10/23/2017      Quantity:   90      Refills:   0  Take 1 tablet daily    No show:   2  (once in 2016 and once in 2017)  Last office visit:   7/10/17  Next office visit:   2/5/18  Last labs:   10/17/17    Shonna Kenny CMA  1/18/2018 11:25 AM

## 2018-01-22 ENCOUNTER — TELEPHONE (OUTPATIENT)
Dept: RHEUMATOLOGY | Facility: CLINIC | Age: 58
End: 2018-01-22

## 2018-01-22 RX ORDER — LEFLUNOMIDE 20 MG/1
20 TABLET ORAL DAILY
Qty: 30 TABLET | Refills: 0 | Status: SHIPPED | OUTPATIENT
Start: 2018-01-22 | End: 2018-02-05

## 2018-01-22 NOTE — TELEPHONE ENCOUNTER
30 day supply has been sent to patients pharmacy. Patient has been notified.  Shonna Kenny CMA  1/22/2018 10:09 AM

## 2018-01-23 ENCOUNTER — TELEPHONE (OUTPATIENT)
Dept: FAMILY MEDICINE | Facility: CLINIC | Age: 58
End: 2018-01-23

## 2018-01-23 DIAGNOSIS — J01.01 ACUTE RECURRENT MAXILLARY SINUSITIS: Primary | ICD-10-CM

## 2018-01-23 RX ORDER — AMOXICILLIN 500 MG/1
500 CAPSULE ORAL 3 TIMES DAILY
Qty: 30 CAPSULE | Refills: 0 | Status: SHIPPED | OUTPATIENT
Start: 2018-01-23 | End: 2018-10-26

## 2018-01-23 NOTE — LETTER
[address] [id number] [phone]          January 23, 2018    RE:  Bora Monroy                                                                                                                     [address] [place] [id number]            To Whom It May Concern ,     Due to illness, Bora will not be able to work from Monday 1/22/18 through Sunday, 1/28/18.      He may return to work without restrictions on Monday 1/29/18 if he feels recovered by that time.       Your understanding is appreciated.       Sincerely,       Ibeth Jones MD  [phone] (ph)  [phone] (fax)

## 2018-01-23 NOTE — TELEPHONE ENCOUNTER
Called and spoke with patient, informed of letter and Rx.  Faxed letter, placed in team 1 TC's accordion file under today's date.  RN advised patient if he gets worse to call or come into clinic.  He verbalized understanding.      Traci Palmer RN  [facility]

## 2018-01-23 NOTE — TELEPHONE ENCOUNTER
Letter written on his behalf.      Amoxicillin sent (immunocompromised, h/o bacterial pneumonias. ... )

## 2018-01-23 NOTE — TELEPHONE ENCOUNTER
Please see message below.  This started Friday, so Tamiflu will not work for him.  He has missed work Monday, Tuesday, and expects to be out the rest of this week.  This started with a headache on Friday and worsened over the weekend.  He has had pneumonia 4 times but this feels different.    RN advised that Tamiflu only works with symptoms less than 2 days.  He is asking if there is anything that will help, he cannot take Z-richard as it gives his diarrhea and doesn't work for him.    His wife has also been sick for 2 weeks.    He is requesting a letter for work, he will check with his wife to see if this can be faxed to her.  Otherwise he is unsure how to get this letter.    Routed to PCP and covering providers (Dr. Jones was checking his messages earlier this morning, not sure if he will continue to do this).    Traci Palmer RN  [facility]

## 2018-01-23 NOTE — TELEPHONE ENCOUNTER
Reason for Call:  Other letter    Detailed comments: Wife's fax number:[phone]  Attn:Lisa    Phone Number Patient can be reached at: Home number on file [phone] (home)    Best Time: Please call home number when fax has been sent    Can we leave a detailed message on this number? YES    Call taken on 1/23/2018 at 1:34 PM by Natalee Braxton

## 2018-01-23 NOTE — TELEPHONE ENCOUNTER
Missed work 2 days.  Started last Friday.  Started with headache, got up Saturday morning he felt horrible.  Sinus pain, right ear, chest congestion.  Sunday worse.  Monday he felt fatigued, lightheadedness.    He's had pneumonia x4 times.    He's been hydrated.        Can't use Z-paks, they never work and he gets terrible diarrhea.  He normally gets Amoxicillin.      His wife has been sick for 2 weeks as well.        Influenza-Like Illness ([facility]) Protocol    Bora Monroy      Age: 57 year old     YOB: 1960    Are you currently sick or have you had close contact with someone who is currently sick?   Yes, this patient is currently sick.     Adult Clinical Evaluation    Is this patient experiencing ANY of the following?  Unconsciousness or unresponsiveness No   Difficulty breathing or swallowing No   Blue or dusky lips, skin, or nail beds No   Chest pain No   Severe confusion or delirium No   Seizure activity: ongoing or stopped No   Severe dehydration or signs of shock No   Patient sounds very sick on the phone No     Is this patient experiencing ANY of the following?  Fever > 104 or shaking chills No   Wheezing with minimal response to usual wheezing medications or new wheezing No   Repeated vomiting or diarrhea with signs of dehydration (no urination within last 12 hours) No   Flu-like symptoms that initially improved but returned with fever and a worse cough No   Stiff or painful neck No   Severe headache No     Does the patient have any of the following?  Measured fever > 100 degrees No   Chills or feels very warm to the touch No   Cough Yes   Sore throat Yes   Muscle/ body aches Yes   Headaches Yes   Fatigue (tiredness) Yes     Nursing Plan  Routed chart to provider - Patient has been sick since Friday, Tamiflu only works for symptoms less than 2 days.  He is also looking for a note.    Provided home care instructions    General home care instruction:      Avoid contact with people in your  household who are at increased risk for more severe complications of influenza (such as pregnant women or people who have a chronic health condition, for example diabetes, heart disease, asthma, or emphysema).    Stay home from work, school, childcare or other public places until your fever (37.8 degrees Celsius [100 degrees Fahrenheit]) has been gone for at least 24 hours, except to seek medical care. (Fever should be gone without the use of fever-reducing medications.) Use a surgical mask if available, or cover your mouth and nose with a tissue if possible if you need to seek medical care. Contact your work place, school, or  as they may have longer exclusion times.    You may continue to shed virus after your fever is gone. Limit your contact with high-risk individuals for 10 days after your symptoms started and be especially careful to cover your coughs/sneezes and wash your hands.    Cover your cough and wash your hands often, and especially after coughing, sneezing, blowing your nose.    Drink plenty of fluids (such as water, broth, sports drinks, electrolyte beverages for children) to prevent dehydration.    Avoid tobacco and second hand smoke.    Get plenty of rest.    Use over-the-counter pain relievers as needed per  instructions.    Do not give aspirin (acetylsalicylic acid) or products that contain aspirin (e.g. bismuth subsalicylate - Pepto Bismol) to children or teenagers 18 years or younger.    A small number of people with influenza do not have fever. If you have respiratory symptoms and are at increased risk for complications of influenza, contact your health care provider to discuss these symptoms.    For parents of infants:    If possible, only family members who are not sick should care for infants.    Wash your hands with soap and water, or an alcohol-based hand rub (if your hands are not visibly soiled) before caring for your infant.    Cover your mouth and nose with a tissue  when coughing or sneezing, and clean your hands.    Contact a health care provider to discuss your illness within 1-2 days if you are    Pregnant    Immunocompromised    Call 911 if you experience:    Difficulty breathing or shortness of breath    Pain or pressure in the chest    Confusion or less responsive than normal    Seizure activity: ongoing or stopped    Severe dehydration or signs of shock     Blue or dusky lips, skin, or nail beds    If further questions/concerns or if new symptoms develop, call your PCP or [facility] as soon as possible.    When to seek medical attention    Contact your health care provider right away if you experience:    A painful sore throat accompanied by fever persists for more than 48 hours    Ear pain, sinus pain, persistent vomiting and/or diarrhea    Oral temperature greater than 104  Fahrenheit (40  Celsius)    Dehydration (e.g., mouth feeling dry, dizzy when sitting/standing, decreased urine output)    Severe or persistent vomiting; unable to keep fluids down    Improvement in flu-like symptoms (fever and cough or sore throat) but then return of fever and worse cough or sore throat    Not drinking enough fluid    Any other concerns not stated above        Additional educational resources include:    http://www.[employer].com    http://www.cdc.gov/flu/  Traci Palmer

## 2018-01-23 NOTE — TELEPHONE ENCOUNTER
Reason for call:  Patient reporting a symptom    Symptom or request: flu symptom/cough/body aches/headaches    Duration (how long have symptoms been present): 1/19/18    Have you been treated for this before? No    Additional comments: patient hasn't been to work due to flu symptoms and would like to have a doctors note stating that he is sick with the flu would like to be prescribed something over the phone     Phone Number patient can be reached at:  Home number on file [phone] (home)    Best Time:  any    Can we leave a detailed message on this number:  YES    Call taken on 1/23/2018 at 8:48 AM by Angelica Soliz

## 2018-02-05 ENCOUNTER — HOSPITAL ENCOUNTER (OUTPATIENT)
Dept: CT IMAGING | Facility: CLINIC | Age: 58
Discharge: HOME | End: 2018-02-05
Attending: FAMILY MEDICINE | Admitting: FAMILY MEDICINE
Payer: COMMERCIAL

## 2018-02-05 ENCOUNTER — OFFICE VISIT (OUTPATIENT)
Dept: RHEUMATOLOGY | Facility: CLINIC | Age: 58
End: 2018-02-05
Payer: COMMERCIAL

## 2018-02-05 VITALS
BODY MASS INDEX: 22.9 KG/M2 | SYSTOLIC BLOOD PRESSURE: 138 MMHG | HEART RATE: 71 BPM | HEIGHT: 71 IN | OXYGEN SATURATION: 97 % | WEIGHT: 163.6 LBS | DIASTOLIC BLOOD PRESSURE: 78 MMHG

## 2018-02-05 DIAGNOSIS — M05.79 SEROPOSITIVE RHEUMATOID ARTHRITIS OF MULTIPLE SITES (H): Primary | ICD-10-CM

## 2018-02-05 DIAGNOSIS — Z13.6 SCREENING FOR HEART DISEASE: ICD-10-CM

## 2018-02-05 DIAGNOSIS — Z79.899 HIGH RISK MEDICATIONS (NOT ANTICOAGULANTS) LONG-TERM USE: ICD-10-CM

## 2018-02-05 LAB
ALBUMIN SERPL-MCNC: 4 G/DL (ref 3.4–5)
ALP SERPL-CCNC: 70 U/L (ref 40–150)
ALT SERPL W P-5'-P-CCNC: 45 U/L (ref 0–70)
AST SERPL W P-5'-P-CCNC: 24 U/L (ref 0–45)
BASOPHILS # BLD AUTO: 0 10E9/L (ref 0–0.2)
BASOPHILS NFR BLD AUTO: 0.6 %
BILIRUB DIRECT SERPL-MCNC: <0.1 MG/DL (ref 0–0.2)
BILIRUB SERPL-MCNC: 0.5 MG/DL (ref 0.2–1.3)
CREAT SERPL-MCNC: 0.84 MG/DL (ref 0.66–1.25)
DIFFERENTIAL METHOD BLD: ABNORMAL
EOSINOPHIL # BLD AUTO: 0 10E9/L (ref 0–0.7)
EOSINOPHIL NFR BLD AUTO: 0.9 %
ERYTHROCYTE [DISTWIDTH] IN BLOOD BY AUTOMATED COUNT: 12.1 % (ref 10–15)
GFR SERPL CREATININE-BSD FRML MDRD: >90 ML/MIN/1.7M2
HCT VFR BLD AUTO: 40.7 % (ref 40–53)
HGB BLD-MCNC: 14 G/DL (ref 13.3–17.7)
LYMPHOCYTES # BLD AUTO: 1.5 10E9/L (ref 0.8–5.3)
LYMPHOCYTES NFR BLD AUTO: 31.2 %
MCH RBC QN AUTO: 32 PG (ref 26.5–33)
MCHC RBC AUTO-ENTMCNC: 34.4 G/DL (ref 31.5–36.5)
MCV RBC AUTO: 93 FL (ref 78–100)
MONOCYTES # BLD AUTO: 0.4 10E9/L (ref 0–1.3)
MONOCYTES NFR BLD AUTO: 9 %
NEUTROPHILS # BLD AUTO: 2.7 10E9/L (ref 1.6–8.3)
NEUTROPHILS NFR BLD AUTO: 58.3 %
PLATELET # BLD AUTO: 255 10E9/L (ref 150–450)
PROT SERPL-MCNC: 6.9 G/DL (ref 6.8–8.8)
RBC # BLD AUTO: 4.38 10E12/L (ref 4.4–5.9)
WBC # BLD AUTO: 4.7 10E9/L (ref 4–11)

## 2018-02-05 PROCEDURE — 85025 COMPLETE CBC W/AUTO DIFF WBC: CPT | Performed by: INTERNAL MEDICINE

## 2018-02-05 PROCEDURE — 80076 HEPATIC FUNCTION PANEL: CPT | Performed by: INTERNAL MEDICINE

## 2018-02-05 PROCEDURE — 75571 CT HRT W/O DYE W/CA TEST: CPT

## 2018-02-05 PROCEDURE — 75571 CT HRT W/O DYE W/CA TEST: CPT | Mod: 26 | Performed by: INTERNAL MEDICINE

## 2018-02-05 PROCEDURE — 99213 OFFICE O/P EST LOW 20 MIN: CPT | Performed by: INTERNAL MEDICINE

## 2018-02-05 PROCEDURE — 36415 COLL VENOUS BLD VENIPUNCTURE: CPT | Performed by: INTERNAL MEDICINE

## 2018-02-05 PROCEDURE — 82565 ASSAY OF CREATININE: CPT | Performed by: INTERNAL MEDICINE

## 2018-02-05 RX ORDER — PREDNISONE 2.5 MG/1
2.5 TABLET ORAL EVERY OTHER DAY
Qty: 45 TABLET | Refills: 1 | Status: SHIPPED | OUTPATIENT
Start: 2018-02-05 | End: 2018-08-06

## 2018-02-05 RX ORDER — LEFLUNOMIDE 20 MG/1
20 TABLET ORAL DAILY
Qty: 90 TABLET | Refills: 0 | Status: SHIPPED | OUTPATIENT
Start: 2018-02-05 | End: 2018-07-05

## 2018-02-05 NOTE — Clinical Note
RA is doing well.  He is slowly tapering prednisone: Now 2.5 mg every other day.  Hoping to get him off of it.

## 2018-02-05 NOTE — MR AVS SNAPSHOT
After Visit Summary   2/5/2018    Bora Monroy    MRN: [id number]           Patient Information     Date Of Birth          1960        Visit Information        Provider Department      2/5/2018 8:40 AM Eduard Mazariegos MD [place] [facility]        Today's Diagnoses     Seropositive rheumatoid arthritis of multiple sites (HCC)    -  1    High risk medications (not anticoagulants) long-term use          Care Instructions    Please have labs done May (beginning of month)  and then a few days prior to follow up.          Follow-ups after your visit        Your next 10 appointments already scheduled     Feb 05, 2018 10:30 AM CST   (Arrive by 10:15 AM)   CT CALCIUM SCREENING with [facility]   [facility] CT ([facility])    [address] [facility] [facility] [place] [address] [phone]           It is best to avoid caffeine on the day of your test.  Be sure to tell your doctor:   If there s any chance you are pregnant.   If you have any special needs.  Please wear loose clothing, such as a sweat suit or jogging clothes. Avoid snaps, zippers and other metal. We may ask you to undress and put on a hospital gown.  If you have any questions, please call the Imaging Department where you will have your exam.            Aug 06, 2018  3:20 PM CDT   Return Visit with MD [facility] ([facility] [place])    [id number] [address] at [address] [facility]  [place] [id number] [phone]              Future tests that were ordered for you today     Open Future Orders        Priority Expected Expires Ordered    CBC with platelets differential Routine 5/2/2018 5/21/2018 2/5/2018    Creatinine Routine 5/2/2018 5/21/2018 2/5/2018    Hepatic panel Routine 5/2/2018 5/21/2018 2/5/2018    CBC with platelets differential Routine 8/1/2018 8/17/2018 2/5/2018    Creatinine Routine 8/1/2018 8/17/2018 2/5/2018    Erythrocyte sedimentation rate auto Routine 8/1/2018 8/17/2018 2/5/2018    CRP  "inflammation Routine 8/1/2018 8/17/2018 2/5/2018    Hepatic panel Routine 8/1/2018 8/17/2018 2/5/2018            Who to contact     If you have questions or need follow up information about today's clinic visit or your schedule please contact [facility] directly at [phone].  Normal or non-critical lab and imaging results will be communicated to you by MyChart, letter or phone within 4 business days after the clinic has received the results. If you do not hear from us within 7 days, please contact the clinic through inFreeDAhart or phone. If you have a critical or abnormal lab result, we will notify you by phone as soon as possible.  Submit refill requests through [employer] or call your pharmacy and they will forward the refill request to us. Please allow 3 business days for your refill to be completed.          Additional Information About Your Visit        inFreeDAhart Information     [employer] gives you secure access to your electronic health record. If you see a primary care provider, you can also send messages to your care team and make appointments. If you have questions, please call your primary care clinic.  If you do not have a primary care provider, please call [phone] and they will assist you.        Care EveryWhere ID     This is your Care EveryWhere ID. This could be used by other organizations to access your [facility] medical records  [id number]        Your Vitals Were     Pulse Height Pulse Oximetry BMI (Body Mass Index)          71 1.803 m (5' 11\") 97% 22.82 kg/m2         Blood Pressure from Last 3 Encounters:   02/05/18 138/78   11/21/17 138/73   07/10/17 142/84    Weight from Last 3 Encounters:   02/05/18 74.2 kg (163 lb 9.6 oz)   11/21/17 73 kg (161 lb)   07/10/17 77.1 kg (170 lb)              We Performed the Following     CBC with platelets differential     Creatinine     Hepatic panel          Today's Medication Changes          These changes are accurate as of 2/5/18  9:09 AM.  If you " have any questions, ask your nurse or doctor.               These medicines have changed or have updated prescriptions.        Dose/Directions    predniSONE 2.5 MG tablet   Commonly known as:  DELTASONE   This may have changed:    - medication strength  - how much to take  - when to take this   Used for:  Seropositive rheumatoid arthritis of multiple sites (H), High risk medications (not anticoagulants) long-term use   Changed by:  Eduard Mazariegos MD        Dose:  2.5 mg   Take 1 tablet (2.5 mg) by mouth every other day   Quantity:  45 tablet   Refills:  1            Where to get your medicines      These medications were sent to [id number] - [place] - [address] [facility] ([place], [place])  [address] [facility], [place]  [place] [address]     Phone:  [phone]     leflunomide 20 MG tablet    predniSONE 2.5 MG tablet                Primary Care Provider Office Phone # Fax #    Yakov Jones [phone]       [address] [facility] [address]        Equal Access to Services     [facility]: Hadii aad ku hadasho Soomaali, waaxda luqadaha, qaybta kaalmada adeegyada, waxay idiin hayaan dione aviles . So [facility] [phone].    ATENCIÓN: Si habla español, tiene a olmedo disposición servicios gratuitos de asistencia lingüística. [facility] [phone].    We comply with applicable federal civil rights laws and [place] laws. We do not discriminate on the basis of race, color, national origin, age, disability, sex, sexual orientation, or gender identity.            Thank you!     Thank you for choosing [facility]  for your care. Our goal is always to provide you with excellent care. Hearing back from our patients is one way we can continue to improve our services. Please take a few minutes to complete the written survey that you may receive in the mail after your visit with us. Thank you!             Your Updated Medication List - Protect others around you: Learn how to  safely use, store and throw away your medicines at www.disposemymeds.org.          This list is accurate as of 2/5/18  9:09 AM.  Always use your most recent med list.                   Brand Name Dispense Instructions for use Diagnosis    amLODIPine 5 MG tablet    NORVASC    90 tablet    Take 1 tablet (5 mg) by mouth daily    Hypertension goal BP (blood pressure) < 140/90       amoxicillin 500 MG capsule    AMOXIL    30 capsule    Take 1 capsule (500 mg) by mouth 3 times daily    Acute recurrent maxillary sinusitis       cinnamon 500 MG Tabs      Take 1 tablet by mouth daily        fish oil-omega-3 fatty acids 1000 MG capsule      1 tablet daily        KRILL OIL PO      Take 1 tablet by mouth daily        leflunomide 20 MG tablet    ARAVA    90 tablet    Take 1 tablet (20 mg) by mouth daily    Seropositive rheumatoid arthritis of multiple sites (H), High risk medications (not anticoagulants) long-term use       METAMUCIL 30.9 % Powd   Generic drug:  psyllium      Take 1 tsp by mouth 2 times daily.        Multi-vitamin Tabs tablet   Generic drug:  multivitamin, therapeutic with minerals      1 TABLET DAILY        predniSONE 2.5 MG tablet    DELTASONE    45 tablet    Take 1 tablet (2.5 mg) by mouth every other day    Seropositive rheumatoid arthritis of multiple sites (H), High risk medications (not anticoagulants) long-term use       triamcinolone 0.1 % cream    KENALOG    60 g    Apply sparingly to affected area two to three times daily as needed for hand rash    Eczema, unspecified type       VITAMIN C PO      None Entered        VITAMIN E PO

## 2018-02-05 NOTE — NURSING NOTE
"Chief Complaint   Patient presents with     Arthritis     patient states he is doing good, no stiffness or pain       Initial /78 (BP Location: Right arm, Patient Position: Chair, Cuff Size: Adult Regular)  Pulse 71  Ht 1.803 m (5' 11\")  Wt 74.2 kg (163 lb 9.6 oz)  SpO2 97%  BMI 22.82 kg/m2 Estimated body mass index is 22.82 kg/(m^2) as calculated from the following:    Height as of this encounter: 1.803 m (5' 11\").    Weight as of this encounter: 74.2 kg (163 lb 9.6 oz).  BP completed using cuff size: regular         RAPID3 (0-30) Cumulative Score  0          RAPID3 Weighted Score (divide #4 by 3 and that is the weighted score)  0         "

## 2018-02-05 NOTE — PROGRESS NOTES
Rheumatology Clinic Visit      Bora Monroy MRN# [id number]   YOB: 1960 Age: 57 year old      Date of visit: 2/05/18   PCP: Dr. Yakov Jones     Chief Complaint   Rheumatoid arthritis  Patient presents with:  Arthritis: patient states he is doing good, no stiffness or pain      Assessment and Plan     1. Seropositive rheumatoid arthritis (RF negative, CCP positive): Currently on leflunomide 20 mg daily and prednisone 2.5 mg every other day.  He is doing well today and I encouraged him to continue tapering the prednisone.  He would like to have 2.5 mg tablets to continue taking prednisone 2.5 mg every other day but will try to take it every third day, or every fourth day.  I offered to give him 1 mg tablets of prednisone but he prefers to remain on 2.5 mg tablets.  Note that we discussed the risks and side effects of prednisone again; the association of hyperglycemia and prednisone was concerning to him and he said that that was more motivation for him to reduce the dose.    - Continue leflunomide to 20 mg daily   - Prednisone 2.5mg every other day; encouraged continued tapering  - Labs today, in 3 months and 2-3 days prior to the next rheumatology clinic visit: CBC, Creatinine, Hepatic Panel              Rapid 3, cumulative scores                      2/5/2018: 0 (leflunomide 20mg daily, prednisone 2.5mg every other day)    # Prednisone Risks and Benefits: The risks and benefits of prednisone were discussed in detail and the patient verbalized understanding.  The risks discussed include, but are not limited to, weight gain, fluid retention, impaired wound healing, hyperglycemia, adrenal suppression, GI upset, peptic ulcer, hepatotoxicity, aseptic necrosis of the femoral and humeral heads, osteoporosis, myopathy, tendon rupture (particularly Achilles tendon), ocular changes including an increased intraocular pressure.  I encouraged reviewing the package insert and asking any questions about the  medication.      Mr. Monroy verbalized agreement with and understanding of the rational for the diagnosis and treatment plan.  All questions were answered to best of my ability and the patient's satisfaction. Mr. Monroy was advised to contact the clinic with any questions that may arise after the clinic visit.      Thank you for involving me in the care of the patient    Return to clinic: 6 months, at the patient's request    HPI   Bora Monroy is a 57 year old male hyperlipidemia, and seropositive rheumatoid arthritis who is here for follow-up of RA.     Today, Mr. Monroy reports that he is doing well.  Currently on leflunomide 20 mg daily and prednisone 2.5 mg every other day.  He says that he understands the risks of prednisone and will continue trying to taper it.  No joint pain.  No morning stiffness.  No gelling phenomenon.  Tolerating his medications well.       Denies fevers, chills, nausea, vomiting, constipation, diarrhea. No abdominal pain. No chest pain/pressure, palpitations, or shortness of breath. No oral or nasal sores. No neck pain. No rash. No LE swelling.  No photosensitivity or photophobia.  No sicca symptoms.  No eye pain or redness.     ROS   GEN: No fevers, chills, night sweats, or weight change  SKIN: No itching, rashes, sores  HEENT:  No oral or nasal ulcers.  CV: No chest pain, pressure, palpitations, or dyspnea on exertion.  PULM: No SOB, wheeze, cough.  GI: No nausea, vomiting, constipation, diarrhea. No blood in stool. No abdominal pain.  : Recently had a kidney stone  MSK: See HPI.  NEURO: No numbness, tingling, or weakness.  EXT: No LE swelling    Active Problem List     Patient Active Problem List   Diagnosis     Presbyopias     Hyperopia     Hyperlipidemia with target LDL less than 130     Impaired fasting glucose     High risk medications (not anticoagulants) long-term use     Advanced directives, counseling/discussion     Seropositive rheumatoid arthritis of multiple sites  (HCC)     Hypertension goal BP (blood pressure) < 140/90     Kidney stone     Past Medical History     Past Medical History:   Diagnosis Date     Hyperlipidemia LDL goal < 130      Hypertension goal BP (blood pressure) < 140/90 11/17/2015    NL stress test 4/1/16, med started 11/16     Impaired fasting glucose      Kidney stone 12/8/2016     Seropositive rheumatoid arthritis of multiple sites (H) 11/17/2015     Past Surgical History     Past Surgical History:   Procedure Laterality Date     COLONOSCOPY  11/5/2010    COLONOSCOPY performed by FERMIN MCCLELLAND at [facility]     TONSILLECTOMY  1964    AGE 4     Allergy   No Known Allergies  Current Medication List     Current Outpatient Prescriptions   Medication Sig     leflunomide (ARAVA) 20 MG tablet Take 1 tablet (20 mg) by mouth daily     amLODIPine (NORVASC) 5 MG tablet Take 1 tablet (5 mg) by mouth daily     predniSONE (DELTASONE) 5 MG tablet Take 1 tablet (5 mg) by mouth daily     triamcinolone (KENALOG) 0.1 % cream Apply sparingly to affected area two to three times daily as needed for hand rash     KRILL OIL PO Take 1 tablet by mouth daily     VITAMIN E PO      cinnamon 500 MG TABS Take 1 tablet by mouth daily      psyllium (METAMUCIL) 30.9 % POWD Take 1 tsp by mouth 2 times daily.     VITAMIN C OR None Entered     MULTI-VITAMIN OR TABS 1 TABLET DAILY     FISH OIL 1000 MG OR CAPS 1 tablet daily     amoxicillin (AMOXIL) 500 MG capsule Take 1 capsule (500 mg) by mouth 3 times daily (Patient not taking: Reported on 2/5/2018)     No current facility-administered medications for this visit.          Social History     See HPI    Family History     Family History   Problem Relation Age of Onset     Lipids Sister      DIABETES Sister      Lipids Father      Hypertension Father      CEREBROVASCULAR DISEASE Maternal Aunt      CANCER No family hx of        Physical Exam     Temp Readings from Last 3 Encounters:   11/21/17 98.3  F (36.8  C) (Oral)   07/10/17 97.9  F  "(36.6  C) (Oral)   11/18/16 97.3  F (36.3  C) (Oral)     BP Readings from Last 5 Encounters:   02/05/18 138/78   11/21/17 138/73   07/10/17 142/84   12/19/16 163/85   11/18/16 157/87     Pulse Readings from Last 1 Encounters:   02/05/18 71     Resp Readings from Last 1 Encounters:   05/23/16 16     Estimated body mass index is 22.82 kg/(m^2) as calculated from the following:    Height as of this encounter: 1.803 m (5' 11\").    Weight as of this encounter: 74.2 kg (163 lb 9.6 oz).    GEN: NAD  HEENT: MMM. No oral lesions. Anicteric, noninjected sclera  CV: S1, S2. RRR. No m/r/g.  PULM: CTA bilaterally. No w/c.  MSK: Hands, wrists, and elbows without synovial swelling, increased warmth, tenderness to palpation, or overlying erythema.  Negative MCP squeeze.  Normal  strength. Knees, ankles, and MTPs without swelling or tenderness to palpation.  Negative MTP squeeze.  SKIN: No rash  EXT: No LE edema  PSYCH: Alert. Appropriate.    Labs   RF/CCP  Recent Labs   Lab Test  07/05/12   1247   CCPABY  24*   RHF  <7     CBC  Recent Labs   Lab Test  10/17/17   0829  06/26/17   0900  03/21/17   1453   WBC  5.2  7.3  7.8   RBC  4.57  4.57  4.53   HGB  14.8  14.8  14.7   HCT  42.1  42.5  41.9   MCV  92  93  93   RDW  12.2  12.0  12.0   PLT  193  206  225   MCH  32.4  32.4  32.5   MCHC  35.2  34.8  35.1   NEUTROPHIL  58.0  66.4  72.1   LYMPH  31.5  23.8  20.8   MONOCYTE  9.5  8.8  6.4   EOSINOPHIL  0.6  0.7  0.4   BASOPHIL  0.4  0.3  0.3   ANEU  3.0  4.9  5.6   ALYM  1.6  1.7  1.6   MANDI  0.5  0.6  0.5   AEOS  0.0  0.1  0.0   ABAS  0.0  0.0  0.0     CMP  Recent Labs   Lab Test  10/17/17   0829  06/26/17   0900  03/21/17   1453   11/18/16   0819   NA  137   --   139   --   137   POTASSIUM  4.3   --   4.0   --   4.1   CHLORIDE  103   --   104   --   104   CO2  31   --   26   --   27   ANIONGAP  3   --   9   --   6   GLC  106*   --   103*   --   107*   BUN  12   --   15   --   15   CR  0.96  0.87  0.85  0.86   < >  0.87 "   GFRESTIMATED  80  >90  Non  GFR Calc    >90  Non  GFR Calc    >90  Non  GFR Calc     < >  >90  Non  GFR Calc     GFRESTBLACK  >90  >90  African American GFR Calc    >90   GFR Calc    >90   GFR Calc     < >  >90   GFR Calc     MOLINA  9.0   --   9.2   --   9.0   BILITOTAL  0.7  0.4  0.4   < >   --    ALBUMIN  4.1  3.9  4.1  4.2   < >   --    PROTTOTAL  7.1  7.1  6.9   < >   --    ALKPHOS  80  86  79   < >   --    AST  14  15  15   < >   --    ALT  27  28  28   < >   --     < > = values in this interval not displayed.     Calcium/VitaminD  Recent Labs   Lab Test  10/17/17   0829  03/21/17   1453  11/18/16   0819   10/12/15   1439   MOLINA  9.0  9.2  9.0   < >   --    VITDT   --    --    --    --   46    < > = values in this interval not displayed.     ESR/CRP  Recent Labs   Lab Test  10/17/17   0829  06/26/17   0900  03/21/17   1453   SED  4  6  6   CRP  <2.9  10.3*  3.5     Hepatitis C  Recent Labs   Lab Test  08/07/12   1439   HCVAB  Negative     Tuberculosis Screening  Recent Labs   Lab Test  08/07/12   1440   TBRSLT  Negative   TBAGN  0.11     Immunization History     Immunization History   Administered Date(s) Administered     Influenza (IIV3) PF 10/29/2007, 10/13/2012, 10/15/2013     Influenza Vaccine IM 3yrs+ 4 Valent IIV4 10/30/2014, 10/29/2015, 10/15/2016, 10/13/2017     Pneumococcal 23 valent 10/31/2002     TD (ADULT, 7+) 09/03/2004     TDAP Vaccine (Boostrix) 11/13/2014          Chart documentation done in part with Dragon Voice recognition Software. Although reviewed after completion, some word and grammatical error may remain.    Eduard Mazariegos MD

## 2018-05-15 DIAGNOSIS — Z79.899 HIGH RISK MEDICATIONS (NOT ANTICOAGULANTS) LONG-TERM USE: ICD-10-CM

## 2018-05-15 DIAGNOSIS — M05.79 SEROPOSITIVE RHEUMATOID ARTHRITIS OF MULTIPLE SITES (H): ICD-10-CM

## 2018-05-15 LAB
ALBUMIN SERPL-MCNC: 4.1 G/DL (ref 3.4–5)
ALP SERPL-CCNC: 84 U/L (ref 40–150)
ALT SERPL W P-5'-P-CCNC: 119 U/L (ref 0–70)
AST SERPL W P-5'-P-CCNC: 43 U/L (ref 0–45)
BASOPHILS # BLD AUTO: 0 10E9/L (ref 0–0.2)
BASOPHILS NFR BLD AUTO: 0.5 %
BILIRUB DIRECT SERPL-MCNC: <0.1 MG/DL (ref 0–0.2)
BILIRUB SERPL-MCNC: 0.4 MG/DL (ref 0.2–1.3)
CREAT SERPL-MCNC: 0.88 MG/DL (ref 0.66–1.25)
DIFFERENTIAL METHOD BLD: NORMAL
EOSINOPHIL # BLD AUTO: 0.1 10E9/L (ref 0–0.7)
EOSINOPHIL NFR BLD AUTO: 1.2 %
ERYTHROCYTE [DISTWIDTH] IN BLOOD BY AUTOMATED COUNT: 11.7 % (ref 10–15)
GFR SERPL CREATININE-BSD FRML MDRD: 89 ML/MIN/1.7M2
HCT VFR BLD AUTO: 43.6 % (ref 40–53)
HGB BLD-MCNC: 15.3 G/DL (ref 13.3–17.7)
LYMPHOCYTES # BLD AUTO: 1.5 10E9/L (ref 0.8–5.3)
LYMPHOCYTES NFR BLD AUTO: 33.9 %
MCH RBC QN AUTO: 32.7 PG (ref 26.5–33)
MCHC RBC AUTO-ENTMCNC: 35.1 G/DL (ref 31.5–36.5)
MCV RBC AUTO: 93 FL (ref 78–100)
MONOCYTES # BLD AUTO: 0.5 10E9/L (ref 0–1.3)
MONOCYTES NFR BLD AUTO: 11.5 %
NEUTROPHILS # BLD AUTO: 2.3 10E9/L (ref 1.6–8.3)
NEUTROPHILS NFR BLD AUTO: 52.9 %
PLATELET # BLD AUTO: 187 10E9/L (ref 150–450)
PROT SERPL-MCNC: 7 G/DL (ref 6.8–8.8)
RBC # BLD AUTO: 4.68 10E12/L (ref 4.4–5.9)
WBC # BLD AUTO: 4.3 10E9/L (ref 4–11)

## 2018-05-15 PROCEDURE — 85025 COMPLETE CBC W/AUTO DIFF WBC: CPT | Performed by: INTERNAL MEDICINE

## 2018-05-15 PROCEDURE — 36415 COLL VENOUS BLD VENIPUNCTURE: CPT | Performed by: INTERNAL MEDICINE

## 2018-05-15 PROCEDURE — 80076 HEPATIC FUNCTION PANEL: CPT | Performed by: INTERNAL MEDICINE

## 2018-05-15 PROCEDURE — 82565 ASSAY OF CREATININE: CPT | Performed by: INTERNAL MEDICINE

## 2018-05-21 DIAGNOSIS — Z79.899 HIGH RISK MEDICATION USE: Primary | ICD-10-CM

## 2018-05-21 NOTE — PROGRESS NOTES
"Rheumatology team: Please call Mr. Monroy to ensure that he got the message below:    [employer] message sent:  \"Mr. Monroy,    The liver enzyme ALT is elevated and might be due to leflunomide.  Because of this, the liver enzymes need to be rechecked.  Please have this done at any [place] lab within the next 10 days.     Sincerely,  Eduard Mazariegos MD  5/21/2018 5:19 AM\""

## 2018-05-25 DIAGNOSIS — Z79.899 HIGH RISK MEDICATION USE: ICD-10-CM

## 2018-05-25 LAB — ALT SERPL W P-5'-P-CCNC: 73 U/L (ref 0–70)

## 2018-05-25 PROCEDURE — 36415 COLL VENOUS BLD VENIPUNCTURE: CPT | Performed by: INTERNAL MEDICINE

## 2018-05-25 PROCEDURE — 84460 ALANINE AMINO (ALT) (SGPT): CPT | Performed by: INTERNAL MEDICINE

## 2018-06-06 NOTE — PROGRESS NOTES
"[email or web address] message sent:  \"Mr. Monroy,    The liver enzyme ALT improved with your most recent labs and will be reevaluated with your next labs.  ALT is now just above the normal range.    Sincerely,  Eduard Mazariegos MD  6/6/2018 5:51 AM\""

## 2018-07-05 DIAGNOSIS — Z79.899 HIGH RISK MEDICATIONS (NOT ANTICOAGULANTS) LONG-TERM USE: ICD-10-CM

## 2018-07-05 DIAGNOSIS — M05.79 SEROPOSITIVE RHEUMATOID ARTHRITIS OF MULTIPLE SITES (H): ICD-10-CM

## 2018-07-05 RX ORDER — LEFLUNOMIDE 20 MG/1
20 TABLET ORAL DAILY
Qty: 90 TABLET | Refills: 0 | Status: CANCELLED | OUTPATIENT
Start: 2018-07-05

## 2018-07-05 NOTE — TELEPHONE ENCOUNTER
Requested Prescriptions   Pending Prescriptions Disp Refills     leflunomide (ARAVA) 20 MG tablet  Last Written Prescription Date:  02/14/18  Last Fill Quantity: 90,  # refills: 0   Last office visit: 2/5/2018 with prescribing provider:  SERGIO Mazariegos   Future Office Visit:   Next 5 appointments (look out 90 days)     Aug 06, 2018  3:20 PM CDT   Return Visit with Eduard Mazariegos MD   [facility] [place] ([facility]    [address]  [place] [address]   [phone]                  90 tablet 0     Sig: Take 1 tablet (20 mg) by mouth daily    There is no refill protocol information for this order

## 2018-07-06 NOTE — TELEPHONE ENCOUNTER
Prescription was refilled on 7/5/2018.    leflunomide (ARAVA) 20 MG tablet 90 tablet 0 7/5/2018  No   Sig - Route: Take 1 tablet (20 mg) by mouth daily - Oral   Class: E-Prescribe   Order: [id number]   E-Prescribing Status: Receipt confirmed by pharmacy (7/5/2018  8:01 AM CDT)     Closing encounter.  Shonna Kenny CMA  7/6/2018 7:22 AM

## 2018-08-06 ENCOUNTER — OFFICE VISIT (OUTPATIENT)
Dept: RHEUMATOLOGY | Facility: CLINIC | Age: 58
End: 2018-08-06
Payer: COMMERCIAL

## 2018-08-06 VITALS
RESPIRATION RATE: 16 BRPM | WEIGHT: 166 LBS | DIASTOLIC BLOOD PRESSURE: 80 MMHG | TEMPERATURE: 97.3 F | SYSTOLIC BLOOD PRESSURE: 146 MMHG | BODY MASS INDEX: 23.15 KG/M2 | HEART RATE: 64 BPM

## 2018-08-06 DIAGNOSIS — M05.79 SEROPOSITIVE RHEUMATOID ARTHRITIS OF MULTIPLE SITES (H): Primary | ICD-10-CM

## 2018-08-06 DIAGNOSIS — Z79.899 HIGH RISK MEDICATIONS (NOT ANTICOAGULANTS) LONG-TERM USE: ICD-10-CM

## 2018-08-06 LAB
ALBUMIN SERPL-MCNC: 4.2 G/DL (ref 3.4–5)
ALP SERPL-CCNC: 82 U/L (ref 40–150)
ALT SERPL W P-5'-P-CCNC: 53 U/L (ref 0–70)
AST SERPL W P-5'-P-CCNC: 23 U/L (ref 0–45)
BASOPHILS # BLD AUTO: 0 10E9/L (ref 0–0.2)
BASOPHILS NFR BLD AUTO: 0.3 %
BILIRUB DIRECT SERPL-MCNC: <0.1 MG/DL (ref 0–0.2)
BILIRUB SERPL-MCNC: 0.5 MG/DL (ref 0.2–1.3)
CREAT SERPL-MCNC: 0.9 MG/DL (ref 0.66–1.25)
CRP SERPL-MCNC: <2.9 MG/L (ref 0–8)
DIFFERENTIAL METHOD BLD: NORMAL
EOSINOPHIL # BLD AUTO: 0 10E9/L (ref 0–0.7)
EOSINOPHIL NFR BLD AUTO: 0.5 %
ERYTHROCYTE [DISTWIDTH] IN BLOOD BY AUTOMATED COUNT: 12.1 % (ref 10–15)
ERYTHROCYTE [SEDIMENTATION RATE] IN BLOOD BY WESTERGREN METHOD: 3 MM/H (ref 0–20)
GFR SERPL CREATININE-BSD FRML MDRD: 87 ML/MIN/1.7M2
HCT VFR BLD AUTO: 42.6 % (ref 40–53)
HGB BLD-MCNC: 15.1 G/DL (ref 13.3–17.7)
LYMPHOCYTES # BLD AUTO: 1.7 10E9/L (ref 0.8–5.3)
LYMPHOCYTES NFR BLD AUTO: 27.7 %
MCH RBC QN AUTO: 33 PG (ref 26.5–33)
MCHC RBC AUTO-ENTMCNC: 35.4 G/DL (ref 31.5–36.5)
MCV RBC AUTO: 93 FL (ref 78–100)
MONOCYTES # BLD AUTO: 0.5 10E9/L (ref 0–1.3)
MONOCYTES NFR BLD AUTO: 8 %
NEUTROPHILS # BLD AUTO: 4 10E9/L (ref 1.6–8.3)
NEUTROPHILS NFR BLD AUTO: 63.5 %
PLATELET # BLD AUTO: 206 10E9/L (ref 150–450)
PROT SERPL-MCNC: 7 G/DL (ref 6.8–8.8)
RBC # BLD AUTO: 4.57 10E12/L (ref 4.4–5.9)
WBC # BLD AUTO: 6.3 10E9/L (ref 4–11)

## 2018-08-06 PROCEDURE — 80076 HEPATIC FUNCTION PANEL: CPT | Performed by: INTERNAL MEDICINE

## 2018-08-06 PROCEDURE — 36415 COLL VENOUS BLD VENIPUNCTURE: CPT | Performed by: INTERNAL MEDICINE

## 2018-08-06 PROCEDURE — 99213 OFFICE O/P EST LOW 20 MIN: CPT | Performed by: INTERNAL MEDICINE

## 2018-08-06 PROCEDURE — 85025 COMPLETE CBC W/AUTO DIFF WBC: CPT | Performed by: INTERNAL MEDICINE

## 2018-08-06 PROCEDURE — 82565 ASSAY OF CREATININE: CPT | Performed by: INTERNAL MEDICINE

## 2018-08-06 PROCEDURE — 86140 C-REACTIVE PROTEIN: CPT | Performed by: INTERNAL MEDICINE

## 2018-08-06 PROCEDURE — 85652 RBC SED RATE AUTOMATED: CPT | Performed by: INTERNAL MEDICINE

## 2018-08-06 RX ORDER — PREDNISONE 2.5 MG/1
2.5-5 TABLET ORAL EVERY OTHER DAY
Qty: 180 TABLET | Refills: 1 | Status: SHIPPED | OUTPATIENT
Start: 2018-08-06 | End: 2019-12-03

## 2018-08-06 RX ORDER — LEFLUNOMIDE 20 MG/1
20 TABLET ORAL EVERY OTHER DAY
Qty: 45 TABLET | Refills: 1 | Status: SHIPPED | OUTPATIENT
Start: 2018-08-06 | End: 2019-02-04

## 2018-08-06 NOTE — PATIENT INSTRUCTIONS
Rheumatology    Dr. Eduard Mazariegos         [place] [facility]   (Monday)  [address]  [place] [address]       [facility]   (Tuesday)  [address]  [place] [address]    [facility]   (Wed., Thurs., and Friday)  [address] [place] [address]    Phone number: [phone]  Thank you for choosing [facility].  Shonna Kenny CMA

## 2018-08-06 NOTE — PROGRESS NOTES
Rheumatology Clinic Visit      Bora Monroy MRN# [id number]   YOB: 1960 Age: 58 year old      Date of visit: 8/06/18   PCP: Dr. Yakov Jones     Chief Complaint   Rheumatoid arthritis  Patient presents with:  Arthritis: 6 month f/u.  Recheck on labs and medications.        Assessment and Plan     1. Seropositive rheumatoid arthritis (RF negative, CCP positive): Currently on leflunomide 20 mg every other day and prednisone 2.5-5 mg daily-to-every-other-day.  Doing well today.  Note that the ALT elevation may be due to taking leflunomide daily instead of every other day, and will be rechecked with today's labs.  - Continue leflunomide 20 mg every other day  - Prednisone 2.5-5 mg every 1-2 days; advised using the lowest effective dose  - Labs today, in 3 months and in 6 months: CBC, Creatinine, Hepatic Panel    2. Elevated blood pressure: Patient to follow up with Primary Care provider regarding elevated blood pressure.     Mr. Monroy verbalized agreement with and understanding of the rational for the diagnosis and treatment plan.  All questions were answered to best of my ability and the patient's satisfaction. Mr. Monroy was advised to contact the clinic with any questions that may arise after the clinic visit.      Thank you for involving me in the care of the patient    Return to clinic: 6 months, at the patient's request    HPI   Bora Monroy is a 58 year old male hyperlipidemia, and seropositive rheumatoid arthritis who is here for follow-up of RA.     Labs at his last rheumatology appointment showed an elevated ALT, and repeat showed improvement of the ALT.    Today, he reports that the reason his ALT was elevated was because he started to take leflunomide daily and because it was reduced he is because he cut the dose in half, taking it every other day; he says that next time he will let me know that he is adjusting the dose.  Currently on leflunomide 20 mg every other day and prednisone  2.5-5 mg daily-to-every-other-day.  No joint pain.  Morning stiffness for no more than a couple minutes.    Denies fevers, chills, nausea, vomiting, constipation, diarrhea. No abdominal pain. No chest pain/pressure, palpitations, or shortness of breath. No oral or nasal sores. No neck pain. No rash. No LE swelling.  No photosensitivity or photophobia.  No sicca symptoms.  No eye pain or redness.     ROS   GEN: No fevers, chills, night sweats, or weight change  SKIN: No itching, rashes, sores  HEENT:  No oral or nasal ulcers.  CV: No chest pain, pressure, palpitations, or dyspnea on exertion.  PULM: No SOB, wheeze, cough.  GI: No nausea, vomiting, constipation, diarrhea. No blood in stool. No abdominal pain.  : Recently had a kidney stone  MSK: See HPI.  NEURO: No numbness, tingling, or weakness.  EXT: No LE swelling    Active Problem List     Patient Active Problem List   Diagnosis     Presbyopias     Hyperopia     Hyperlipidemia with target LDL less than 130     Impaired fasting glucose     High risk medications (not anticoagulants) long-term use     Advanced directives, counseling/discussion     Seropositive rheumatoid arthritis of multiple sites (HCC)     Hypertension goal BP (blood pressure) < 140/90     Kidney stone     Past Medical History     Past Medical History:   Diagnosis Date     Hyperlipidemia LDL goal < 130      Hypertension goal BP (blood pressure) < 140/90 11/17/2015    NL stress test 4/1/16, med started 11/16     Impaired fasting glucose      Kidney stone 12/8/2016     Seropositive rheumatoid arthritis of multiple sites (H) 11/17/2015     Past Surgical History     Past Surgical History:   Procedure Laterality Date     COLONOSCOPY  11/5/2010    COLONOSCOPY performed by FERMIN MCCLELLAND at [facility]     TONSILLECTOMY  1964    AGE 4     Allergy   No Known Allergies  Current Medication List     Current Outpatient Prescriptions   Medication Sig     amLODIPine (NORVASC) 5 MG tablet Take 1 tablet (5 mg)  "by mouth daily     cinnamon 500 MG TABS Take 1 tablet by mouth daily      FISH OIL 1000 MG OR CAPS 1 tablet daily     KRILL OIL PO Take 1 tablet by mouth daily     leflunomide (ARAVA) 20 MG tablet Take 1 tablet (20 mg) by mouth every other day     MULTI-VITAMIN OR TABS 1 TABLET DAILY     predniSONE (DELTASONE) 2.5 MG tablet Take 1-2 tablets (2.5-5 mg) by mouth every other day     psyllium (METAMUCIL) 30.9 % POWD Take 1 tsp by mouth 2 times daily.     triamcinolone (KENALOG) 0.1 % cream Apply sparingly to affected area two to three times daily as needed for hand rash     VITAMIN C OR None Entered     VITAMIN E PO      amoxicillin (AMOXIL) 500 MG capsule Take 1 capsule (500 mg) by mouth 3 times daily (Patient not taking: Reported on 2/5/2018)     [DISCONTINUED] leflunomide (ARAVA) 20 MG tablet Take 1 tablet (20 mg) by mouth daily (Patient taking differently: Take 20 mg by mouth every other day )     No current facility-administered medications for this visit.          Social History     See HPI    Family History     Family History   Problem Relation Age of Onset     Lipids Sister      Diabetes Sister      Lipids Father      Hypertension Father      Cerebrovascular Disease Maternal Aunt      Cancer No family hx of        Physical Exam     Temp Readings from Last 3 Encounters:   08/06/18 97.3  F (36.3  C) (Tympanic)   11/21/17 98.3  F (36.8  C) (Oral)   07/10/17 97.9  F (36.6  C) (Oral)     BP Readings from Last 5 Encounters:   08/06/18 146/80   02/05/18 138/78   11/21/17 138/73   07/10/17 142/84   12/19/16 163/85     Pulse Readings from Last 1 Encounters:   08/06/18 64     Resp Readings from Last 1 Encounters:   08/06/18 16     Estimated body mass index is 23.15 kg/(m^2) as calculated from the following:    Height as of 2/5/18: 1.803 m (5' 11\").    Weight as of this encounter: 75.3 kg (166 lb).    GEN: NAD  HEENT: MMM. No oral lesions. Anicteric, noninjected sclera  CV: S1, S2. RRR. No m/r/g.  PULM: CTA bilaterally. No " w/c.  MSK: Hands, wrists, and elbows without synovial swelling, increased warmth, tenderness to palpation, or overlying erythema.  Negative MCP squeeze.  Normal  strength. Knees, ankles, and MTPs without swelling or tenderness to palpation.  Negative MTP squeeze.  SKIN: No rash  EXT: No LE edema  PSYCH: Alert. Appropriate.    Labs   RF/CCP  Recent Labs   Lab Test  07/05/12   1247   CCPABY  24*   RHF  <7     CBC  Recent Labs   Lab Test  05/15/18   0935  02/05/18   0911  10/17/17   0829   WBC  4.3  4.7  5.2   RBC  4.68  4.38*  4.57   HGB  15.3  14.0  14.8   HCT  43.6  40.7  42.1   MCV  93  93  92   RDW  11.7  12.1  12.2   PLT  187  255  193   MCH  32.7  32.0  32.4   MCHC  35.1  34.4  35.2   NEUTROPHIL  52.9  58.3  58.0   LYMPH  33.9  31.2  31.5   MONOCYTE  11.5  9.0  9.5   EOSINOPHIL  1.2  0.9  0.6   BASOPHIL  0.5  0.6  0.4   ANEU  2.3  2.7  3.0   ALYM  1.5  1.5  1.6   MANDI  0.5  0.4  0.5   AEOS  0.1  0.0  0.0   ABAS  0.0  0.0  0.0     CMP  Recent Labs   Lab Test  05/25/18   0924  05/15/18   0935  02/05/18   0911  10/17/17   0829   03/21/17   1453   11/18/16   0819   NA   --    --    --   137   --   139   --   137   POTASSIUM   --    --    --   4.3   --   4.0   --   4.1   CHLORIDE   --    --    --   103   --   104   --   104   CO2   --    --    --   31   --   26   --   27   ANIONGAP   --    --    --   3   --   9   --   6   GLC   --    --    --   106*   --   103*   --   107*   BUN   --    --    --   12   --   15   --   15   CR   --   0.88  0.84  0.96   < >  0.85  0.86   < >  0.87   GFRESTIMATED   --   89  >90  80   < >  >90  Non  GFR Calc    >90  Non  GFR Calc     < >  >90  Non  GFR Calc     GFRESTBLACK   --   >90  >90  >90   < >  >90   GFR Calc    >90   GFR Calc     < >  >90   GFR Calc     MOLINA   --    --    --   9.0   --   9.2   --   9.0   BILITOTAL   --   0.4  0.5  0.7   < >  0.4   < >   --    ALBUMIN   --   4.1   4.0  4.1   < >  4.1  4.2   < >   --    PROTTOTAL   --   7.0  6.9  7.1   < >  6.9   < >   --    ALKPHOS   --   84  70  80   < >  79   < >   --    AST   --   43  24  14   < >  15   < >   --    ALT  73*  119*  45  27   < >  28   < >   --     < > = values in this interval not displayed.     Calcium/VitaminD  Recent Labs   Lab Test  10/17/17   0829  03/21/17   1453  11/18/16   0819   10/12/15   1439   MOLINA  9.0  9.2  9.0   < >   --    VITDT   --    --    --    --   46    < > = values in this interval not displayed.     ESR/CRP  Recent Labs   Lab Test  10/17/17   0829  06/26/17   0900  03/21/17   1453   SED  4  6  6   CRP  <2.9  10.3*  3.5     Hepatitis C  Recent Labs   Lab Test  08/07/12   1439   HCVAB  Negative     Tuberculosis Screening  Recent Labs   Lab Test  08/07/12   1440   TBRSLT  Negative   TBAGN  0.11     Immunization History     Immunization History   Administered Date(s) Administered     Influenza (IIV3) PF 10/29/2007, 10/13/2012, 10/15/2013     Influenza Vaccine IM 3yrs+ 4 Valent IIV4 10/30/2014, 10/29/2015, 10/15/2016, 10/13/2017     Pneumococcal 23 valent 10/31/2002     TD (ADULT, 7+) 09/03/2004     TDAP Vaccine (Boostrix) 11/13/2014          Chart documentation done in part with Dragon Voice recognition Software. Although reviewed after completion, some word and grammatical error may remain.    Eduard Mazariegos MD

## 2018-08-06 NOTE — MR AVS SNAPSHOT
After Visit Summary   8/6/2018    Bora Monroy    MRN: [id number]           Patient Information     Date Of Birth          1960        Visit Information        Provider Department      8/6/2018 3:20 PM Eduard Mazariegos MD [facility]        Today's Diagnoses     Seropositive rheumatoid arthritis of multiple sites (HCC)    -  1    High risk medications (not anticoagulants) long-term use          Care Instructions    Rheumatology    Dr. Eduard Mazariegos         [place] [facility]   (Monday)  [address]  [place] [address]       [facility]   (Tuesday)  [address] [place] [address]    [facility] Thurs[facility] Friday[facility]  [address]  [place] [address]    Phone number: [phone]  Thank you for choosing [facility].  Shonna Kenny CMA            Follow-ups after your visit        Your next 10 appointments already scheduled     Nov 27, 2018  3:00 PM CST   LAB with BE LAB   [facility] [place] ([facility])    [address] [address]   [phone]           Please do not eat 10-12 hours before your appointment if you are coming in fasting for labs on lipids, cholesterol, or glucose (sugar). This does not apply to pregnant women. Water, hot tea and black coffee (with nothing added) are okay. Do not drink other fluids, diet soda or chew gum.            Jan 31, 2019  3:00 PM CST   LAB with BE LAB   [facility] ([facility])    [address] [address]   [phone]           Please do not eat 10-12 hours before your appointment if you are coming in fasting for labs on lipids, cholesterol, or glucose (sugar). This does not apply to pregnant women. Water, hot tea and black coffee (with nothing added) are okay. Do not drink other fluids, diet soda or chew gum.            Feb 04, 2019  3:20 PM CST   Return Visit with Eduard Mazariegos MD   [facility]  ([facility] [place])    [address]  [place] [address]   [phone]              Future tests that were ordered for you today     Open Future Orders        Priority Expected Expires Ordered    Erythrocyte sedimentation rate auto Routine 1/28/2019 2/17/2019 8/6/2018    CRP inflammation Routine 1/28/2019 2/17/2019 8/6/2018    Hepatic panel Routine 1/28/2019 2/17/2019 8/6/2018    Hepatic panel Routine 10/31/2018 12/4/2018 8/6/2018    Creatinine Routine 10/31/2018 12/4/2018 8/6/2018    CBC with platelets differential Routine 10/31/2018 12/4/2018 8/6/2018    CBC with platelets differential Routine 1/28/2019 2/17/2019 8/6/2018    Creatinine Routine 1/28/2019 2/17/2019 8/6/2018            Who to contact     If you have questions or need follow up information about today's clinic visit or your schedule please contact [facility] [place] directly at [phone].  Normal or non-critical lab and imaging results will be communicated to you by [employer], letter or phone within 4 business days after the clinic has received the results. If you do not hear from us within 7 days, please contact the clinic through [employer] or phone. If you have a critical or abnormal lab result, we will notify you by phone as soon as possible.  Submit refill requests through [employer] or call your pharmacy and they will forward the refill request to us. Please allow 3 business days for your refill to be completed.          Additional Information About Your Visit        [employer] Information     [employer] gives you secure access to your electronic health record. If you see a primary care provider, you can also send messages to your care team and make appointments. If you have questions, please call your primary care clinic.  If you do not have a primary care provider, please call [phone] and they will assist you.        Care EveryWhere ID     This is your Care EveryWhere ID. This could be used by other organizations to access  your [facility] medical records  [id number]        Your Vitals Were     Pulse Temperature Respirations BMI (Body Mass Index)          64 97.3  F (36.3  C) (Tympanic) 16 23.15 kg/m2         Blood Pressure from Last 3 Encounters:   08/06/18 146/80   02/05/18 138/78   11/21/17 138/73    Weight from Last 3 Encounters:   08/06/18 75.3 kg (166 lb)   02/05/18 74.2 kg (163 lb 9.6 oz)   11/21/17 73 kg (161 lb)              We Performed the Following     CBC with platelets differential     Creatinine     CRP inflammation     Erythrocyte sedimentation rate auto     Hepatic panel          Today's Medication Changes          These changes are accurate as of 8/6/18  4:04 PM.  If you have any questions, ask your nurse or doctor.               These medicines have changed or have updated prescriptions.        Dose/Directions    predniSONE 2.5 MG tablet   Commonly known as:  DELTASONE   This may have changed:  how much to take   Used for:  Seropositive rheumatoid arthritis of multiple sites (H)   Changed by:  Eduard Mazariegos MD        Dose:  2.5-5 mg   Take 1-2 tablets (2.5-5 mg) by mouth every other day   Quantity:  180 tablet   Refills:  1            Where to get your medicines      These medications were sent to [id number] - [place] [[facility] [place]  [facility] [address]     Phone:  [phone]     leflunomide 20 MG tablet    predniSONE 2.5 MG tablet                Primary Care Provider Office Phone # Fax #    Yakov Jones [phone] [address] [address]        Equal Access to Services     [facility]: Hadii agueda ku hadasho Soomaali, waaxda luqadaha, qaybta kaalmada adeegyada, maryellen hernandez. So [facility] [phone].    ATENCIÓN: Si habla español, tiene a olmedo disposición servicios gratuitos de asistencia lingüística. Llame al [phone].    We comply with applicable federal civil rights laws and  [place] laws. We do not discriminate on the basis of race, color, national origin, age, disability, sex, sexual orientation, or gender identity.            Thank you!     Thank you for choosing [facility]  for your care. Our goal is always to provide you with excellent care. Hearing back from our patients is one way we can continue to improve our services. Please take a few minutes to complete the written survey that you may receive in the mail after your visit with us. Thank you!             Your Updated Medication List - Protect others around you: Learn how to safely use, store and throw away your medicines at www.disposemymeds.org.          This list is accurate as of 8/6/18  4:04 PM.  Always use your most recent med list.                   Brand Name Dispense Instructions for use Diagnosis    amLODIPine 5 MG tablet    NORVASC    90 tablet    Take 1 tablet (5 mg) by mouth daily    Hypertension goal BP (blood pressure) < 140/90       amoxicillin 500 MG capsule    AMOXIL    30 capsule    Take 1 capsule (500 mg) by mouth 3 times daily    Acute recurrent maxillary sinusitis       cinnamon 500 MG Tabs      Take 1 tablet by mouth daily        fish oil-omega-3 fatty acids 1000 MG capsule      1 tablet daily        KRILL OIL PO      Take 1 tablet by mouth daily        leflunomide 20 MG tablet    ARAVA    45 tablet    Take 1 tablet (20 mg) by mouth every other day    Seropositive rheumatoid arthritis of multiple sites (H)       METAMUCIL 30.9 % Powd   Generic drug:  psyllium      Take 1 tsp by mouth 2 times daily.        Multi-vitamin Tabs tablet   Generic drug:  multivitamin, therapeutic with minerals      1 TABLET DAILY        predniSONE 2.5 MG tablet    DELTASONE    180 tablet    Take 1-2 tablets (2.5-5 mg) by mouth every other day    Seropositive rheumatoid arthritis of multiple sites (H)       triamcinolone 0.1 % cream    KENALOG    60 g    Apply sparingly to affected area two to three times daily as  needed for hand rash    Eczema, unspecified type       VITAMIN C PO      None Entered        VITAMIN E PO

## 2018-10-05 ENCOUNTER — TELEPHONE (OUTPATIENT)
Dept: FAMILY MEDICINE | Facility: CLINIC | Age: 58
End: 2018-10-05

## 2018-10-05 DIAGNOSIS — E78.5 HYPERLIPIDEMIA WITH TARGET LDL LESS THAN 130: ICD-10-CM

## 2018-10-05 DIAGNOSIS — Z11.4 SCREENING FOR HIV (HUMAN IMMUNODEFICIENCY VIRUS): ICD-10-CM

## 2018-10-05 DIAGNOSIS — R73.01 IMPAIRED FASTING GLUCOSE: Primary | ICD-10-CM

## 2018-10-05 DIAGNOSIS — I10 HYPERTENSION GOAL BP (BLOOD PRESSURE) < 140/90: ICD-10-CM

## 2018-10-05 DIAGNOSIS — Z12.5 SCREENING FOR PROSTATE CANCER: ICD-10-CM

## 2018-10-05 NOTE — TELEPHONE ENCOUNTER
Called patient at [phone] (home) to notify him of labs ordered. Unable to reach, patient/family was instructed to return call to [facility] RN directly on the RN call back line at [phone].    Loretta Gardner RN  [facility]

## 2018-10-05 NOTE — TELEPHONE ENCOUNTER
Reason for Call: Request for an order or referral:    Order or referral being requested: Labs/PSA test    Date needed: as soon as possible    Has the patient been seen by the PCP for this problem? YES    Additional comments: Patient stated he is making a appt at [facility], at this appointment he is having other labs done. He is wondering if her can get his Physical labs done at the same time. Pt is also requesting to have a PSA test done.    Phone number Patient can be reached at:  Home number on file [phone] (home)    Best Time:  Anytime    Can we leave a detailed message on this number?  YES    Call taken on 10/5/2018 at 10:26 AM by Nona Hastings

## 2018-10-05 NOTE — TELEPHONE ENCOUNTER
To PCP:  Please see below message and advise.  Patient due in November for physical.  Thank you.  Claire Uirbe RN

## 2018-10-05 NOTE — TELEPHONE ENCOUNTER
Patient called back. Called patient back to notify him of labs ordered. Unable to reach, patient/family was instructed to return call to [facility] RN directly on the RN call back line at [phone].    Loretta Gardner RN  [facility]

## 2018-10-16 DIAGNOSIS — R73.01 IMPAIRED FASTING GLUCOSE: ICD-10-CM

## 2018-10-16 DIAGNOSIS — Z12.5 SCREENING FOR PROSTATE CANCER: ICD-10-CM

## 2018-10-16 DIAGNOSIS — E78.5 HYPERLIPIDEMIA WITH TARGET LDL LESS THAN 130: ICD-10-CM

## 2018-10-16 DIAGNOSIS — Z11.4 SCREENING FOR HIV (HUMAN IMMUNODEFICIENCY VIRUS): ICD-10-CM

## 2018-10-16 DIAGNOSIS — M05.79 SEROPOSITIVE RHEUMATOID ARTHRITIS OF MULTIPLE SITES (H): ICD-10-CM

## 2018-10-16 DIAGNOSIS — Z79.899 HIGH RISK MEDICATIONS (NOT ANTICOAGULANTS) LONG-TERM USE: ICD-10-CM

## 2018-10-16 LAB
ALBUMIN SERPL-MCNC: 4 G/DL (ref 3.4–5)
ALP SERPL-CCNC: 78 U/L (ref 40–150)
ALT SERPL W P-5'-P-CCNC: 49 U/L (ref 0–70)
ANION GAP SERPL CALCULATED.3IONS-SCNC: 6 MMOL/L (ref 3–14)
AST SERPL W P-5'-P-CCNC: 22 U/L (ref 0–45)
BASOPHILS # BLD AUTO: 0 10E9/L (ref 0–0.2)
BASOPHILS NFR BLD AUTO: 0.7 %
BILIRUB DIRECT SERPL-MCNC: 0.2 MG/DL (ref 0–0.2)
BILIRUB SERPL-MCNC: 0.8 MG/DL (ref 0.2–1.3)
BUN SERPL-MCNC: 12 MG/DL (ref 7–30)
CALCIUM SERPL-MCNC: 8.8 MG/DL (ref 8.5–10.1)
CHLORIDE SERPL-SCNC: 101 MMOL/L (ref 94–109)
CHOLEST SERPL-MCNC: 259 MG/DL
CO2 SERPL-SCNC: 30 MMOL/L (ref 20–32)
CREAT SERPL-MCNC: 0.86 MG/DL (ref 0.66–1.25)
DIFFERENTIAL METHOD BLD: NORMAL
EOSINOPHIL # BLD AUTO: 0.1 10E9/L (ref 0–0.7)
EOSINOPHIL NFR BLD AUTO: 1.1 %
ERYTHROCYTE [DISTWIDTH] IN BLOOD BY AUTOMATED COUNT: 12 % (ref 10–15)
GFR SERPL CREATININE-BSD FRML MDRD: >90 ML/MIN/1.7M2
GLUCOSE SERPL-MCNC: 103 MG/DL (ref 70–99)
HBA1C MFR BLD: 5 % (ref 0–5.6)
HCT VFR BLD AUTO: 42.1 % (ref 40–53)
HDLC SERPL-MCNC: 51 MG/DL
HGB BLD-MCNC: 14.8 G/DL (ref 13.3–17.7)
HIV 1+2 AB+HIV1 P24 AG SERPL QL IA: NONREACTIVE
LDLC SERPL CALC-MCNC: 185 MG/DL
LYMPHOCYTES # BLD AUTO: 1.5 10E9/L (ref 0.8–5.3)
LYMPHOCYTES NFR BLD AUTO: 33.1 %
MCH RBC QN AUTO: 32.8 PG (ref 26.5–33)
MCHC RBC AUTO-ENTMCNC: 35.2 G/DL (ref 31.5–36.5)
MCV RBC AUTO: 93 FL (ref 78–100)
MONOCYTES # BLD AUTO: 0.4 10E9/L (ref 0–1.3)
MONOCYTES NFR BLD AUTO: 9.6 %
NEUTROPHILS # BLD AUTO: 2.4 10E9/L (ref 1.6–8.3)
NEUTROPHILS NFR BLD AUTO: 55.5 %
NONHDLC SERPL-MCNC: 208 MG/DL
PLATELET # BLD AUTO: 198 10E9/L (ref 150–450)
POTASSIUM SERPL-SCNC: 4.2 MMOL/L (ref 3.4–5.3)
PROT SERPL-MCNC: 6.9 G/DL (ref 6.8–8.8)
PSA SERPL-ACNC: 1.39 UG/L (ref 0–4)
RBC # BLD AUTO: 4.51 10E12/L (ref 4.4–5.9)
SODIUM SERPL-SCNC: 137 MMOL/L (ref 133–144)
TRIGL SERPL-MCNC: 116 MG/DL
WBC # BLD AUTO: 4.4 10E9/L (ref 4–11)

## 2018-10-16 PROCEDURE — 80048 BASIC METABOLIC PNL TOTAL CA: CPT | Performed by: FAMILY MEDICINE

## 2018-10-16 PROCEDURE — 36415 COLL VENOUS BLD VENIPUNCTURE: CPT | Performed by: FAMILY MEDICINE

## 2018-10-16 PROCEDURE — 85025 COMPLETE CBC W/AUTO DIFF WBC: CPT | Performed by: FAMILY MEDICINE

## 2018-10-16 PROCEDURE — 80076 HEPATIC FUNCTION PANEL: CPT | Performed by: FAMILY MEDICINE

## 2018-10-16 PROCEDURE — G0103 PSA SCREENING: HCPCS | Performed by: FAMILY MEDICINE

## 2018-10-16 PROCEDURE — 80061 LIPID PANEL: CPT | Performed by: FAMILY MEDICINE

## 2018-10-16 PROCEDURE — 87389 HIV-1 AG W/HIV-1&-2 AB AG IA: CPT | Performed by: FAMILY MEDICINE

## 2018-10-16 PROCEDURE — 83036 HEMOGLOBIN GLYCOSYLATED A1C: CPT | Performed by: FAMILY MEDICINE

## 2018-10-26 ENCOUNTER — TELEPHONE (OUTPATIENT)
Dept: FAMILY MEDICINE | Facility: CLINIC | Age: 58
End: 2018-10-26

## 2018-10-26 DIAGNOSIS — J01.01 ACUTE RECURRENT MAXILLARY SINUSITIS: ICD-10-CM

## 2018-10-26 DIAGNOSIS — J06.9 VIRAL URI: ICD-10-CM

## 2018-10-26 RX ORDER — CODEINE PHOSPHATE AND GUAIFENESIN 10; 100 MG/5ML; MG/5ML
2 SOLUTION ORAL EVERY 4 HOURS PRN
Qty: 180 ML | Refills: 0 | Status: SHIPPED | OUTPATIENT
Start: 2018-10-26 | End: 2018-11-27

## 2018-10-26 RX ORDER — AMOXICILLIN 500 MG/1
500 CAPSULE ORAL 3 TIMES DAILY
Qty: 30 CAPSULE | Refills: 0 | Status: SHIPPED | OUTPATIENT
Start: 2018-10-26 | End: 2018-11-27

## 2018-10-26 NOTE — TELEPHONE ENCOUNTER
Reason for call:  Patient reporting a symptom    Symptom or request: cough/cold/sore throat     Duration (how long have symptoms been present): 3 days     Have you been treated for this before? No    Additional comments: patient would like to get a medication sent to the pharmacy he said he is to sick to come to clinic   [id number] - [place] [facility] [place] - [address]    Phone Number patient can be reached at:  Home number on file [phone] (home)    Best Time:  Any     Can we leave a detailed message on this number:  YES    Call taken on 10/26/2018 at 8:10 AM by Angelica Soliz

## 2018-10-26 NOTE — TELEPHONE ENCOUNTER
To Dr. Savage,  Patient states PCP usually sends in Amoxicillin and Robitussin with codeine for these symptoms.  States he's had Pneumonia x 4.  Symptoms started Monday.  Sore throat, cough, congestion with thick yellow phlegm, fever, chills, body aches, Sinus headache.   Cough worse when laying flat.  Has not been to work all week.  Please advise.  Thank you.  Claire Uribe RN

## 2018-10-26 NOTE — TELEPHONE ENCOUNTER
Patient informed and script for Robitussin with Codeine  Faxed to [facility].  Claire Uribe RN

## 2018-11-18 DIAGNOSIS — I10 HYPERTENSION GOAL BP (BLOOD PRESSURE) < 140/90: ICD-10-CM

## 2018-11-19 RX ORDER — AMLODIPINE BESYLATE 5 MG/1
TABLET ORAL
Qty: 90 TABLET | Refills: 2 | Status: SHIPPED | OUTPATIENT
Start: 2018-11-19 | End: 2018-11-27

## 2018-11-19 NOTE — TELEPHONE ENCOUNTER
Routing refill request to provider for review/approval because does not meet protocol:  Blood pressure out of range:  Per below    Route to PCP    Ann-Marie Lara RN

## 2018-11-19 NOTE — TELEPHONE ENCOUNTER
"Requested Prescriptions   Pending Prescriptions Disp Refills     amLODIPine (NORVASC) 5 MG tablet [Pharmacy Med Name: AmLODIPine Besylate Oral Tablet 5 MG] 90 tablet 2    Last Written Prescription Date:  11-21-17  Last Fill Quantity: 90,  # refills: 3   Last office visit: 11/21/2017 with prescribing provider:  11-21-17   Future Office Visit:   Next 5 appointments (look out 90 days)     Nov 27, 2018  6:40 AM CST   PHYSICAL with Yakov Jones MD   [facility] ([facility]    [address] [address]   [phone]            Feb 04, 2019  3:20 PM CST   Return Visit with Eduard Mazariegos MD   [facility] ([facility])    [id number] [place] [address]   [phone]                  Sig: Take 1 tablet (5 mg) by mouth daily    Calcium Channel Blockers Protocol  Failed    11/18/2018  7:00 AM       Failed - Blood pressure under 140/90 in past 12 months    BP Readings from Last 3 Encounters:   08/06/18 146/80   02/05/18 138/78   11/21/17 138/73                Passed - Recent (12 mo) or future (30 days) visit within the authorizing provider's specialty    Patient had office visit in the last 12 months or has a visit in the next 30 days with authorizing provider or within the authorizing provider's specialty.  See \"Patient Info\" tab in inbasket, or \"Choose Columns\" in Meds & Orders section of the refill encounter.             Passed - Patient is age 18 or older       Passed - Normal serum creatinine on file in past 12 months    Recent Labs   Lab Test  10/16/18   0757   CR  0.86               "

## 2018-11-27 ENCOUNTER — OFFICE VISIT (OUTPATIENT)
Dept: FAMILY MEDICINE | Facility: CLINIC | Age: 58
End: 2018-11-27
Payer: COMMERCIAL

## 2018-11-27 VITALS
HEIGHT: 71 IN | DIASTOLIC BLOOD PRESSURE: 73 MMHG | OXYGEN SATURATION: 96 % | BODY MASS INDEX: 23.24 KG/M2 | WEIGHT: 166 LBS | HEART RATE: 68 BPM | SYSTOLIC BLOOD PRESSURE: 130 MMHG | TEMPERATURE: 97.5 F

## 2018-11-27 DIAGNOSIS — Z79.899 HIGH RISK MEDICATIONS (NOT ANTICOAGULANTS) LONG-TERM USE: ICD-10-CM

## 2018-11-27 DIAGNOSIS — M05.79 SEROPOSITIVE RHEUMATOID ARTHRITIS OF MULTIPLE SITES (H): ICD-10-CM

## 2018-11-27 DIAGNOSIS — R73.01 IMPAIRED FASTING GLUCOSE: ICD-10-CM

## 2018-11-27 DIAGNOSIS — E78.5 HYPERLIPIDEMIA WITH TARGET LDL LESS THAN 130: ICD-10-CM

## 2018-11-27 DIAGNOSIS — Z00.00 ROUTINE GENERAL MEDICAL EXAMINATION AT A HEALTH CARE FACILITY: Primary | ICD-10-CM

## 2018-11-27 DIAGNOSIS — L81.4 SOLAR LENTIGO: ICD-10-CM

## 2018-11-27 DIAGNOSIS — I10 HYPERTENSION GOAL BP (BLOOD PRESSURE) < 140/90: ICD-10-CM

## 2018-11-27 PROCEDURE — 99213 OFFICE O/P EST LOW 20 MIN: CPT | Mod: 25 | Performed by: FAMILY MEDICINE

## 2018-11-27 PROCEDURE — 99396 PREV VISIT EST AGE 40-64: CPT | Performed by: FAMILY MEDICINE

## 2018-11-27 RX ORDER — AMLODIPINE BESYLATE 5 MG/1
TABLET ORAL
Qty: 90 TABLET | Refills: 3 | Status: SHIPPED | OUTPATIENT
Start: 2018-11-27 | End: 2019-12-03

## 2018-11-27 ASSESSMENT — ENCOUNTER SYMPTOMS
DIZZINESS: 0
CHILLS: 0
EYE PAIN: 0
CONSTIPATION: 0
HEMATURIA: 0
HEMATOCHEZIA: 0
COUGH: 0
ABDOMINAL PAIN: 0
DIARRHEA: 0

## 2018-11-27 NOTE — MR AVS SNAPSHOT
After Visit Summary   11/27/2018    Bora Monroy    MRN: [id number]           Patient Information     Date Of Birth          1960        Visit Information        Provider Department      11/27/2018 6:40 AM Yakov Jones MD [facility]        Today's Diagnoses     Routine general medical examination at a health care facility    -  1    Hypertension goal BP (blood pressure) < 140/90        Impaired fasting glucose        Seropositive rheumatoid arthritis of multiple sites (HCC)        High risk medications (not anticoagulants) long-term use        Solar lentigo           Follow-ups after your visit        Follow-up notes from your care team     Return in about 1 year (around 11/27/2019).      Your next 10 appointments already scheduled     Jan 31, 2019  3:00 PM CST   LAB with [facility] LAB   [facility]    [address]  [place] [address] [phone]           Please do not eat 10-12 hours before your appointment if you are coming in fasting for labs on lipids, cholesterol, or glucose (sugar). This does not apply to pregnant women. Water, hot tea and black coffee (with nothing added) are okay. Do not drink other fluids, diet soda or chew gum.            Feb 04, 2019  3:20 PM CST   Return Visit with Eduard Mazariegos MD   [facility] [place] ([facility])    [address]  [place] [address] [phone]              Who to contact     If you have questions or need follow up information about today's clinic visit or your schedule please contact [facility] directly at [phone].  Normal or non-critical lab and imaging results will be communicated to you by MyChart, letter or phone within 4 business days after the clinic has received the results. If you do not hear from us within 7 days, please contact the clinic through MyChart or phone. If you have a critical or  "abnormal lab result, we will notify you by phone as soon as possible.  Submit refill requests through [employer] or call your pharmacy and they will forward the refill request to us. Please allow 3 business days for your refill to be completed.          Additional Information About Your Visit        Violet Greyhart Information     [employer] gives you secure access to your electronic health record. If you see a primary care provider, you can also send messages to your care team and make appointments. If you have questions, please call your primary care clinic.  If you do not have a primary care provider, please call [phone] and they will assist you.        Care EveryWhere ID     This is your Care EveryWhere ID. This could be used by other organizations to access your [facility] medical records  [id number]        Your Vitals Were     Pulse Temperature Height Pulse Oximetry BMI (Body Mass Index)       68 97.5  F (36.4  C) (Oral) 5' 11\" (1.803 m) 96% 23.15 kg/m2        Blood Pressure from Last 3 Encounters:   11/27/18 130/73   08/06/18 146/80   02/05/18 138/78    Weight from Last 3 Encounters:   11/27/18 166 lb (75.3 kg)   08/06/18 166 lb (75.3 kg)   02/05/18 163 lb 9.6 oz (74.2 kg)              Today, you had the following     No orders found for display         Where to get your medicines      These medications were sent to [facility] - [address]  [address] [place] [address]     Phone:  [phone]     amLODIPine 5 MG tablet          Primary Care Provider Office Phone # Fax #    Yakov Jones [phone]       [address] [address]        Equal Access to Services     PRAFUL ROSEN : Salvatore Otero, henrik leon, mitzy fonseca, maryellen hernandez. So [facility] [phone].    ATENCIÓN: Si habla español, tiene a olmedo disposición servicios gratuitos de asistencia lingüística. Llame al " [phone].    We comply with applicable federal civil rights laws and [place] laws. We do not discriminate on the basis of race, color, national origin, age, disability, sex, sexual orientation, or gender identity.            Thank you!     Thank you for choosing [facility]  for your care. Our goal is always to provide you with excellent care. Hearing back from our patients is one way we can continue to improve our services. Please take a few minutes to complete the written survey that you may receive in the mail after your visit with us. Thank you!             Your Updated Medication List - Protect others around you: Learn how to safely use, store and throw away your medicines at www.disposemymeds.org.          This list is accurate as of 11/27/18  7:22 AM.  Always use your most recent med list.                   Brand Name Dispense Instructions for use Diagnosis    amLODIPine 5 MG tablet    NORVASC    90 tablet    Take 1 tablet (5 mg) by mouth daily    Hypertension goal BP (blood pressure) < 140/90       cinnamon 500 MG Tabs      Take 1 tablet by mouth daily        fish oil-omega-3 fatty acids 1000 MG capsule      1 tablet daily        KRILL OIL PO      Take 1 tablet by mouth daily        leflunomide 20 MG tablet    ARAVA    45 tablet    Take 1 tablet (20 mg) by mouth every other day    Seropositive rheumatoid arthritis of multiple sites (H)       METAMUCIL 30.9 % Powd   Generic drug:  psyllium      Take 1 tsp by mouth 2 times daily.        Multi-vitamin tablet   Generic drug:  multivitamin w/minerals      1 TABLET DAILY        predniSONE 2.5 MG tablet    DELTASONE    180 tablet    Take 1-2 tablets (2.5-5 mg) by mouth every other day    Seropositive rheumatoid arthritis of multiple sites (H)       triamcinolone 0.1 % cream    KENALOG    60 g    Apply sparingly to affected area two to three times daily as needed for hand rash    Eczema, unspecified type       VITAMIN C PO      None  Entered        VITAMIN D PO      Take 1,000 Units by mouth        VITAMIN E PO

## 2018-11-27 NOTE — PROGRESS NOTES
SUBJECTIVE:   CC: Bora Monroy is an 58 year old male who presents for a preventative health visit and follow-up on some baseline health conditions.     Physical   Annual:     Getting at least 3 servings of Calcium per day:  Yes    Bi-annual eye exam:  Yes    Dental care twice a year:  Yes    Sleep apnea or symptoms of sleep apnea:  None    Diet:  Low salt    Frequency of exercise:  2-3 days/week    Duration of exercise:  15-30 minutes    Taking medications regularly:  Yes    Medication side effects:  None    Additional concerns today:  Yes    PHQ-2 Total Score: 0      Other concerns:     Spot on right arm above wrist.    He notes a tan colored skin lesion on the right distal forearm area.  It is asymptomatic.    He remains on low-dose prednisone and Arava for his rheumatoid arthritis.  His arthritis seems well controlled.  He had lab work done in mid October.  He had pneumonia about 4 weeks ago.  He feels like he is over that now.  He remains on the amlodipine for hypertension.    Today's PHQ-2 Score:   PHQ-2 ( 1999 Pfizer) 11/27/2018   Q1: Little interest or pleasure in doing things 0   Q2: Feeling down, depressed or hopeless 0   PHQ-2 Score 0   Q1: Little interest or pleasure in doing things Not at all   Q2: Feeling down, depressed or hopeless Not at all   PHQ-2 Score 0       Abuse: Current or Past(Physical, Sexual or Emotional)- No  Do you feel safe in your environment? Yes    Social History   Substance Use Topics     Smoking status: Never Smoker     Smokeless tobacco: Never Used     Alcohol use Yes      Comment: rare     Alcohol Use 11/27/2018   If you drink alcohol do you typically have greater than 3 drinks per day OR greater than 7 drinks per week? No       Last PSA:   PSA   Date Value Ref Range Status   10/16/2018 1.39 0 - 4 ug/L Final     Comment:     Assay Method:  Chemiluminescence using Siemens Vista analyzer       Reviewed orders with patient. Reviewed health maintenance and updated orders  accordingly - Yes  Patient Active Problem List   Diagnosis     Presbyopias     Hyperopia     Hyperlipidemia with target LDL less than 130     Impaired fasting glucose     High risk medications (not anticoagulants) long-term use     Advanced directives, counseling/discussion     Seropositive rheumatoid arthritis of multiple sites (HCC)     Hypertension goal BP (blood pressure) < 140/90     Kidney stone     Past Surgical History:   Procedure Laterality Date     COLONOSCOPY  11/5/2010    COLONOSCOPY performed by FERMIN MCCLELLAND at [facility]     TONSILLECTOMY  1964    AGE 4       Social History   Substance Use Topics     Smoking status: Never Smoker     Smokeless tobacco: Never Used     Alcohol use Yes      Comment: rare     Family History   Problem Relation Age of Onset     Lipids Sister      Diabetes Sister      Lipids Father      Hypertension Father      Cerebrovascular Disease Maternal Aunt      Cancer No family hx of          Current Outpatient Prescriptions   Medication Sig Dispense Refill     amLODIPine (NORVASC) 5 MG tablet Take 1 tablet (5 mg) by mouth daily 90 tablet 3     Cholecalciferol (VITAMIN D PO) Take 1,000 Units by mouth       cinnamon 500 MG TABS Take 1 tablet by mouth daily        FISH OIL 1000 MG OR CAPS 1 tablet daily       KRILL OIL PO Take 1 tablet by mouth daily       leflunomide (ARAVA) 20 MG tablet Take 1 tablet (20 mg) by mouth every other day 45 tablet 1     MULTI-VITAMIN OR TABS 1 TABLET DAILY       predniSONE (DELTASONE) 2.5 MG tablet Take 1-2 tablets (2.5-5 mg) by mouth every other day 180 tablet 1     psyllium (METAMUCIL) 30.9 % POWD Take 1 tsp by mouth 2 times daily.       triamcinolone (KENALOG) 0.1 % cream Apply sparingly to affected area two to three times daily as needed for hand rash 60 g 2     VITAMIN C OR None Entered       VITAMIN E PO        [DISCONTINUED] amLODIPine (NORVASC) 5 MG tablet Take 1 tablet (5 mg) by mouth daily 90 tablet 2     No Known Allergies    Reviewed and  "updated as needed this visit by clinical staff  Tobacco  Allergies  Meds  Med Hx  Surg Hx  Fam Hx  Soc Hx        Reviewed and updated as needed this visit by Provider            Review of Systems   Constitutional: Negative for chills.   HENT: Negative for congestion and ear pain.    Eyes: Negative for pain.   Respiratory: Negative for cough.    Cardiovascular: Negative for chest pain.   Gastrointestinal: Negative for abdominal pain, constipation, diarrhea and hematochezia.   Genitourinary: Negative for hematuria.   Neurological: Negative for dizziness.       OBJECTIVE:   /73 (BP Location: Right arm, Patient Position: Chair, Cuff Size: Adult Regular)  Pulse 68  Temp 97.5  F (36.4  C) (Oral)  Ht 5' 11\" (1.803 m)  Wt 166 lb (75.3 kg)  SpO2 96%  BMI 23.15 kg/m2    Physical Exam  GENERAL: healthy, alert and no distress  EYES: Eyes grossly normal to inspection, PERRL and conjunctivae and sclerae normal  HENT: ear canals and TM's normal, nose and mouth without ulcers or lesions  NECK: no adenopathy, no asymmetry, masses, or scars and thyroid normal to palpation  RESP: lungs clear to auscultation - no rales, rhonchi or wheezes  CV: regular rate and rhythm, normal S1 S2, no S3 or S4, no murmur, click or rub, no peripheral edema and peripheral pulses strong  ABDOMEN: soft, nontender, no hepatosplenomegaly, no masses   MS: no gross musculoskeletal defects noted, no edema  SKIN: no suspicious lesions or rashes.  He has a small oval-shaped handmade either skin lesion on the extensor side of the distal right forearm consistent with a solar lentigo.  No ulceration or raised borders or any other suggestion of skin cancer currently.  NEURO: Normal strength and tone, mentation intact and speech normal  PSYCH: mentation appears normal, affect normal/bright    Diagnostic Test Results:  Orders Only on 10/16/2018   Component Date Value Ref Range Status     Bilirubin Direct 10/16/2018 0.2  0.0 - 0.2 mg/dL Final     " Bilirubin Total 10/16/2018 0.8  0.2 - 1.3 mg/dL Final     Albumin 10/16/2018 4.0  3.4 - 5.0 g/dL Final     Protein Total 10/16/2018 6.9  6.8 - 8.8 g/dL Final     Alkaline Phosphatase 10/16/2018 78  40 - 150 U/L Final     ALT 10/16/2018 49  0 - 70 U/L Final     AST 10/16/2018 22  0 - 45 U/L Final     WBC 10/16/2018 4.4  4.0 - 11.0 10e9/L Final     RBC Count 10/16/2018 4.51  4.4 - 5.9 10e12/L Final     Hemoglobin 10/16/2018 14.8  13.3 - 17.7 g/dL Final     Hematocrit 10/16/2018 42.1  40.0 - 53.0 % Final     MCV 10/16/2018 93  78 - 100 fl Final     MCH 10/16/2018 32.8  26.5 - 33.0 pg Final     MCHC 10/16/2018 35.2  31.5 - 36.5 g/dL Final     RDW 10/16/2018 12.0  10.0 - 15.0 % Final     Platelet Count 10/16/2018 198  150 - 450 10e9/L Final     % Neutrophils 10/16/2018 55.5  % Final     % Lymphocytes 10/16/2018 33.1  % Final     % Monocytes 10/16/2018 9.6  % Final     % Eosinophils 10/16/2018 1.1  % Final     % Basophils 10/16/2018 0.7  % Final     Absolute Neutrophil 10/16/2018 2.4  1.6 - 8.3 10e9/L Final     Absolute Lymphocytes 10/16/2018 1.5  0.8 - 5.3 10e9/L Final     Absolute Monocytes 10/16/2018 0.4  0.0 - 1.3 10e9/L Final     Absolute Eosinophils 10/16/2018 0.1  0.0 - 0.7 10e9/L Final     Absolute Basophils 10/16/2018 0.0  0.0 - 0.2 10e9/L Final     Diff Method 10/16/2018 Automated Method   Final     Sodium 10/16/2018 137  133 - 144 mmol/L Final     Potassium 10/16/2018 4.2  3.4 - 5.3 mmol/L Final     Chloride 10/16/2018 101  94 - 109 mmol/L Final     Carbon Dioxide 10/16/2018 30  20 - 32 mmol/L Final     Anion Gap 10/16/2018 6  3 - 14 mmol/L Final     Glucose 10/16/2018 103* 70 - 99 mg/dL Final    Fasting specimen     Urea Nitrogen 10/16/2018 12  7 - 30 mg/dL Final     Creatinine 10/16/2018 0.86  0.66 - 1.25 mg/dL Final     GFR Estimate 10/16/2018 >90  >60 mL/min/1.7m2 Final    Non  GFR Calc     GFR Estimate If Black 10/16/2018 >90  >60 mL/min/1.7m2 Final    African American GFR Calc     Calcium  10/16/2018 8.8  8.5 - 10.1 mg/dL Final     Hemoglobin A1C 10/16/2018 5.0  0 - 5.6 % Final    Comment: Normal <5.7% Prediabetes 5.7-6.4%  Diabetes 6.5% or higher - adopted from ADA   consensus guidelines.       PSA 10/16/2018 1.39  0 - 4 ug/L Final    Assay Method:  Chemiluminescence using Siemens Vista analyzer     Cholesterol 10/16/2018 259* <200 mg/dL Final    Desirable:       <200 mg/dl     Triglycerides 10/16/2018 116  <150 mg/dL Final    Fasting specimen     HDL Cholesterol 10/16/2018 51  >39 mg/dL Final     LDL Cholesterol Calculated 10/16/2018 185* <100 mg/dL Final    Comment: Above desirable:  100-129 mg/dl  Borderline High:  130-159 mg/dL  High:             160-189 mg/dL  Very high:       >189 mg/dl       Non HDL Cholesterol 10/16/2018 208* <130 mg/dL Final    Comment: Above Desirable:  130-159 mg/dl  Borderline high:  160-189 mg/dl  High:             190-219 mg/dl  Very high:       >219 mg/dl       HIV Antigen Antibody Combo 10/16/2018 Nonreactive  NR^Nonreactive     Final    HIV-1 p24 Ag & HIV-1/HIV-2 Ab Not Detected     The 10-year ASCVD risk score (Fainaambrocio HERBERT Jr, et al., 2013) is: 11.1%    Values used to calculate the score:      Age: 58 years      Sex: Male      Is Non- : No      Diabetic: No      Tobacco smoker: No      Systolic Blood Pressure: 130 mmHg      Is BP treated: Yes      HDL Cholesterol: 51 mg/dL      Total Cholesterol: 259 mg/dL      ASSESSMENT/PLAN:       ICD-10-CM    1. Routine general medical examination at a health care facility Z00.00    2. Hypertension goal BP (blood pressure) < 140/90 I10 amLODIPine (NORVASC) 5 MG tablet   3. Impaired fasting glucose R73.01    4. Seropositive rheumatoid arthritis of multiple sites (HCC) M05.79    5. High risk medications (not anticoagulants) long-term use Z79.899    6. Solar lentigo L81.4    7. Hyperlipidemia with target LDL less than 130 E78.5      Blood pressure and other vital signs look good  Lab results are significant for  "a mildly elevated fasting glucose and significantly elevated cholesterol values  Discussed his 10-year cardiovascular disease risk and the recommendation to use a statin, but he declines  He wants to treat this through diet and exercise because he is leery of the statins  He will continue his same baseline meds  Reassurance about the solar lentigo  Continue ongoing rheumatology care for the rheumatoid arthritis  Plan a recheck in 1 year, or sooner prn    COUNSELING:   Reviewed preventive health counseling, as reflected in patient instructions       Regular exercise       Healthy diet/nutrition    BP Readings from Last 1 Encounters:   08/06/18 146/80     Estimated body mass index is 23.15 kg/(m^2) as calculated from the following:    Height as of 2/5/18: 5' 11\" (1.803 m).    Weight as of 8/6/18: 166 lb (75.3 kg).           reports that he has never smoked. He has never used smokeless tobacco.      Counseling Resources:  ATP IV Guidelines  Pooled Cohorts Equation Calculator  FRAX Risk Assessment  ICSI Preventive Guidelines  Dietary Guidelines for Americans, 2010  USDA's MyPlate  ASA Prophylaxis  Lung CA Screening    Yakov Jones MD  [facility]  "

## 2019-02-04 ENCOUNTER — OFFICE VISIT (OUTPATIENT)
Dept: RHEUMATOLOGY | Facility: CLINIC | Age: 59
End: 2019-02-04
Payer: COMMERCIAL

## 2019-02-04 VITALS
HEART RATE: 64 BPM | BODY MASS INDEX: 23.49 KG/M2 | WEIGHT: 168.4 LBS | DIASTOLIC BLOOD PRESSURE: 77 MMHG | TEMPERATURE: 97.8 F | SYSTOLIC BLOOD PRESSURE: 164 MMHG | OXYGEN SATURATION: 98 %

## 2019-02-04 DIAGNOSIS — M05.79 SEROPOSITIVE RHEUMATOID ARTHRITIS OF MULTIPLE SITES (H): Primary | ICD-10-CM

## 2019-02-04 DIAGNOSIS — Z79.899 HIGH RISK MEDICATIONS (NOT ANTICOAGULANTS) LONG-TERM USE: ICD-10-CM

## 2019-02-04 LAB
ALBUMIN SERPL-MCNC: 4 G/DL (ref 3.4–5)
ALP SERPL-CCNC: 81 U/L (ref 40–150)
ALT SERPL W P-5'-P-CCNC: 38 U/L (ref 0–70)
AST SERPL W P-5'-P-CCNC: 22 U/L (ref 0–45)
BASOPHILS # BLD AUTO: 0 10E9/L (ref 0–0.2)
BASOPHILS NFR BLD AUTO: 0.5 %
BILIRUB DIRECT SERPL-MCNC: 0.1 MG/DL (ref 0–0.2)
BILIRUB SERPL-MCNC: 0.4 MG/DL (ref 0.2–1.3)
CREAT SERPL-MCNC: 0.9 MG/DL (ref 0.66–1.25)
CRP SERPL-MCNC: <2.9 MG/L (ref 0–8)
DIFFERENTIAL METHOD BLD: NORMAL
EOSINOPHIL # BLD AUTO: 0 10E9/L (ref 0–0.7)
EOSINOPHIL NFR BLD AUTO: 0.7 %
ERYTHROCYTE [DISTWIDTH] IN BLOOD BY AUTOMATED COUNT: 12 % (ref 10–15)
ERYTHROCYTE [SEDIMENTATION RATE] IN BLOOD BY WESTERGREN METHOD: 5 MM/H (ref 0–20)
GFR SERPL CREATININE-BSD FRML MDRD: >90 ML/MIN/{1.73_M2}
HCT VFR BLD AUTO: 41.6 % (ref 40–53)
HGB BLD-MCNC: 14.9 G/DL (ref 13.3–17.7)
LYMPHOCYTES # BLD AUTO: 1.8 10E9/L (ref 0.8–5.3)
LYMPHOCYTES NFR BLD AUTO: 31 %
MCH RBC QN AUTO: 32.3 PG (ref 26.5–33)
MCHC RBC AUTO-ENTMCNC: 35.8 G/DL (ref 31.5–36.5)
MCV RBC AUTO: 90 FL (ref 78–100)
MONOCYTES # BLD AUTO: 0.4 10E9/L (ref 0–1.3)
MONOCYTES NFR BLD AUTO: 7.7 %
NEUTROPHILS # BLD AUTO: 3.4 10E9/L (ref 1.6–8.3)
NEUTROPHILS NFR BLD AUTO: 60.1 %
PLATELET # BLD AUTO: 221 10E9/L (ref 150–450)
PROT SERPL-MCNC: 6.8 G/DL (ref 6.8–8.8)
RBC # BLD AUTO: 4.62 10E12/L (ref 4.4–5.9)
WBC # BLD AUTO: 5.7 10E9/L (ref 4–11)

## 2019-02-04 PROCEDURE — 85652 RBC SED RATE AUTOMATED: CPT | Performed by: INTERNAL MEDICINE

## 2019-02-04 PROCEDURE — 86140 C-REACTIVE PROTEIN: CPT | Performed by: INTERNAL MEDICINE

## 2019-02-04 PROCEDURE — 36415 COLL VENOUS BLD VENIPUNCTURE: CPT | Performed by: INTERNAL MEDICINE

## 2019-02-04 PROCEDURE — 99213 OFFICE O/P EST LOW 20 MIN: CPT | Performed by: INTERNAL MEDICINE

## 2019-02-04 PROCEDURE — 82565 ASSAY OF CREATININE: CPT | Performed by: INTERNAL MEDICINE

## 2019-02-04 PROCEDURE — 85025 COMPLETE CBC W/AUTO DIFF WBC: CPT | Performed by: INTERNAL MEDICINE

## 2019-02-04 PROCEDURE — 80076 HEPATIC FUNCTION PANEL: CPT | Performed by: INTERNAL MEDICINE

## 2019-02-04 RX ORDER — LEFLUNOMIDE 20 MG/1
20 TABLET ORAL EVERY OTHER DAY
Qty: 45 TABLET | Refills: 2 | Status: SHIPPED | OUTPATIENT
Start: 2019-02-04 | End: 2019-11-22

## 2019-02-04 RX ORDER — PREDNISONE 2.5 MG/1
2.5-5 TABLET ORAL EVERY OTHER DAY
Qty: 180 TABLET | Refills: 1 | Status: CANCELLED | OUTPATIENT
Start: 2019-02-04

## 2019-02-04 ASSESSMENT — PAIN SCALES - GENERAL: PAINLEVEL: NO PAIN (0)

## 2019-02-04 NOTE — PROGRESS NOTES
Rheumatology Clinic Visit      Bora Monroy MRN# [id number]   YOB: 1960 Age: 58 year old      Date of visit: 2/04/19   PCP: Dr. Yakov Jones     Chief Complaint     Patient presents with:  RECHECK: RA-no flare no concerns.    Assessment and Plan     1. Seropositive rheumatoid arthritis (RF negative, CCP positive): Currently on leflunomide 20 mg every other day; he has not required prednisone for a few weeks now.  Doing well and will maintain on his current medication.  - Continue leflunomide 20 mg every other day  - Prednisone 2.5-5 mg every 1-2 days; he is not currently taking prednisone but has it on hand if needed  - Labs today: CBC, Creatinine, Hepatic Panel, ESR, CRP  - Labs in 3 months: CBC, Creatinine, Hepatic Panel  - Labs in 6 months: CBC, Creatinine, Hepatic Panel, ESR, CRP     2. Elevated blood pressure: Patient to follow up with Primary Care provider regarding elevated blood pressure.    3.  Vaccinations: Vaccinations reviewed with Mr. Monroy.  Risks and benefits of vaccinations were discussed.  CDC stance on shingrix when on moderate to high immunosuppression reviewed.  Advised that he receive vaccines as noted below; he says that he would like to discuss with his PCP first.   - Influenza: up to date  - Aaftbrk50: Advised receiving  - Tmtlhdlmb12: to receive at least 8 weeks after spochug81 is administered  - Shingrix: Advised receiving    Mr. Monroy verbalized agreement with and understanding of the rational for the diagnosis and treatment plan.  All questions were answered to best of my ability and the patient's satisfaction. Mr. Monroy was advised to contact the clinic with any questions that may arise after the clinic visit.      Thank you for involving me in the care of the patient    Return to clinic: 6 months, at the patient's request    HPI   Bora Monroy is a 58 year old male hyperlipidemia, and seropositive rheumatoid arthritis who is here for follow-up of RA.     Today,  he reports that he went to get leflunomide from the pharmacy but then realized that his labs were overdue in his clinic visit was coming up.  Right now he is feeling great.  No joint pain.  No morning stiffness.  Tolerating leflunomide 20 mg every other day.  He has not used prednisone for the last 3 weeks.  Recently had pneumonia that required antibiotic treatment; feeling well now.    Denies fevers, chills, nausea, vomiting, constipation, diarrhea. No abdominal pain. No chest pain/pressure, palpitations, or shortness of breath. No oral or nasal sores. No neck pain. No rash. No LE swelling.  No photosensitivity or photophobia.  No sicca symptoms.  No eye pain or redness.     ROS   GEN: No fevers, chills, night sweats, or weight change  SKIN: No itching, rashes, sores  HEENT:  No oral or nasal ulcers.  CV: No chest pain, pressure, palpitations, or dyspnea on exertion.  PULM: No SOB, wheeze, cough.  GI: No nausea, vomiting, constipation, diarrhea. No blood in stool. No abdominal pain.  : Recently had a kidney stone  MSK: See HPI.  NEURO: No numbness, tingling, or weakness.  EXT: No LE swelling    Active Problem List     Patient Active Problem List   Diagnosis     Presbyopias     Hyperopia     Hyperlipidemia with target LDL less than 130     Impaired fasting glucose     High risk medications (not anticoagulants) long-term use     Advanced directives, counseling/discussion     Seropositive rheumatoid arthritis of multiple sites (HCC)     Hypertension goal BP (blood pressure) < 140/90     Kidney stone     Past Medical History     Past Medical History:   Diagnosis Date     Hyperlipidemia LDL goal < 130     declines statins. favorable CAC in 2/18     Hypertension goal BP (blood pressure) < 140/90 11/17/2015    NL stress test 4/1/16, med started 11/16     Impaired fasting glucose      Kidney stone 12/8/2016     Seropositive rheumatoid arthritis of multiple sites (H) 11/17/2015     Past Surgical History     Past Surgical  "History:   Procedure Laterality Date     COLONOSCOPY  11/5/2010    COLONOSCOPY performed by FERMIN MCCLELLAND at [facility]     TONSILLECTOMY  1964    AGE 4     Allergy   No Known Allergies  Current Medication List     Current Outpatient Medications   Medication Sig     amLODIPine (NORVASC) 5 MG tablet Take 1 tablet (5 mg) by mouth daily     Cholecalciferol (VITAMIN D PO) Take 1,000 Units by mouth     cinnamon 500 MG TABS Take 1 tablet by mouth daily      FISH OIL 1000 MG OR CAPS 1 tablet daily     KRILL OIL PO Take 1 tablet by mouth daily     leflunomide (ARAVA) 20 MG tablet Take 1 tablet (20 mg) by mouth every other day     MULTI-VITAMIN OR TABS 1 TABLET DAILY     psyllium (METAMUCIL) 30.9 % POWD Take 1 tsp by mouth 2 times daily.     triamcinolone (KENALOG) 0.1 % cream Apply sparingly to affected area two to three times daily as needed for hand rash     VITAMIN C OR None Entered     VITAMIN E PO      predniSONE (DELTASONE) 2.5 MG tablet Take 1-2 tablets (2.5-5 mg) by mouth every other day (Patient not taking: Reported on 2/4/2019.)     No current facility-administered medications for this visit.          Social History     See HPI    Family History     Family History   Problem Relation Age of Onset     Lipids Sister      Diabetes Sister      Lipids Father      Hypertension Father      Cerebrovascular Disease Maternal Aunt      Cancer No family hx of        Physical Exam     Temp Readings from Last 3 Encounters:   02/04/19 97.8  F (36.6  C) (Oral)   11/27/18 97.5  F (36.4  C) (Oral)   08/06/18 97.3  F (36.3  C) (Tympanic)     BP Readings from Last 5 Encounters:   02/04/19 164/77   11/27/18 130/73   08/06/18 146/80   02/05/18 138/78   11/21/17 138/73     Pulse Readings from Last 1 Encounters:   02/04/19 64     Resp Readings from Last 1 Encounters:   08/06/18 16     Estimated body mass index is 23.49 kg/m  as calculated from the following:    Height as of 11/27/18: 1.803 m (5' 11\").    Weight as of this encounter: " 76.4 kg (168 lb 6.4 oz).    GEN: NAD  HEENT: MMM. No oral lesions. Anicteric, noninjected sclera  CV: S1, S2. RRR. No m/r/g.  PULM: CTA bilaterally. No w/c.  MSK: MCPs, PIPs, wrists, elbows, shoulders, knees, and ankles without swelling or tenderness to palpation.  Negative MCP and MTP squeeze.  Hips nontender to palpation.  SKIN: No rash  EXT: No LE edema  PSYCH: Alert. Appropriate.    Labs   RF/CCP  Recent Labs   Lab Test 07/05/12  1247   CCPABY 24*   RHF <7     CBC  Recent Labs   Lab Test 10/16/18  0757 08/06/18  1608 05/15/18  0935   WBC 4.4 6.3 4.3   RBC 4.51 4.57 4.68   HGB 14.8 15.1 15.3   HCT 42.1 42.6 43.6   MCV 93 93 93   RDW 12.0 12.1 11.7    206 187   MCH 32.8 33.0 32.7   MCHC 35.2 35.4 35.1   NEUTROPHIL 55.5 63.5 52.9   LYMPH 33.1 27.7 33.9   MONOCYTE 9.6 8.0 11.5   EOSINOPHIL 1.1 0.5 1.2   BASOPHIL 0.7 0.3 0.5   ANEU 2.4 4.0 2.3   ALYM 1.5 1.7 1.5   MANDI 0.4 0.5 0.5   AEOS 0.1 0.0 0.1   ABAS 0.0 0.0 0.0     CMP  Recent Labs   Lab Test 10/16/18  0757 08/06/18  1608 05/25/18  0924 05/15/18  0935  10/17/17  0829  03/21/17  1453     --   --   --   --  137  --  139   POTASSIUM 4.2  --   --   --   --  4.3  --  4.0   CHLORIDE 101  --   --   --   --  103  --  104   CO2 30  --   --   --   --  31  --  26   ANIONGAP 6  --   --   --   --  3  --  9   *  --   --   --   --  106*  --  103*   BUN 12  --   --   --   --  12  --  15   CR 0.86 0.90  --  0.88   < > 0.96   < > 0.85  0.86   GFRESTIMATED >90 87  --  89   < > 80   < > >90  Non  GFR Calc    >90  Non  GFR Calc     GFRESTBLACK >90 >90  --  >90   < > >90   < > >90  African American GFR Calc    >90   GFR Calc     MOLINA 8.8  --   --   --   --  9.0  --  9.2   BILITOTAL 0.8 0.5  --  0.4   < > 0.7   < > 0.4   ALBUMIN 4.0 4.2  --  4.1   < > 4.1   < > 4.1  4.2   PROTTOTAL 6.9 7.0  --  7.0   < > 7.1   < > 6.9   ALKPHOS 78 82  --  84   < > 80   < > 79   AST 22 23  --  43   < > 14   < > 15   ALT 49 53 73*  119*   < > 27   < > 28    < > = values in this interval not displayed.     Calcium/VitaminD  Recent Labs   Lab Test 10/16/18  0757 10/17/17  0829 03/21/17  1453  10/12/15  1439   MOLINA 8.8 9.0 9.2   < >  --    VITDT  --   --   --   --  46    < > = values in this interval not displayed.     ESR/CRP  Recent Labs   Lab Test 08/06/18  1608 10/17/17  0829 06/26/17  0900   SED 3 4 6   CRP <2.9 <2.9 10.3*     Hepatitis C  Recent Labs   Lab Test 08/07/12  1439   HCVAB Negative     Tuberculosis Screening  Recent Labs   Lab Test 08/07/12  1440   TBRSLT Negative   TBAGN 0.11     HIV Screening  Recent Labs   Lab Test 10/16/18  0757   HIAGAB Nonreactive     Immunization History     Immunization History   Administered Date(s) Administered     Influenza (IIV3) PF 10/29/2007, 10/13/2012, 10/15/2013     Influenza Vaccine IM 3yrs+ 4 Valent IIV4 10/30/2014, 10/29/2015, 10/15/2016, 10/13/2017, 10/15/2018     Pneumococcal 23 valent 10/31/2002     TD (ADULT, 7+) 09/03/2004     TDAP Vaccine (Boostrix) 11/13/2014          Chart documentation done in part with Dragon Voice recognition Software. Although reviewed after completion, some word and grammatical error may remain.    Eduard Mazariegos MD

## 2019-02-04 NOTE — NURSING NOTE
RAPID3 (0-30) Cumulative Score  0          RAPID3 Weighted Score (divide #4 by 3 and that is the weighted score)  0

## 2019-02-04 NOTE — Clinical Note
Dr. Jones,I recommended to Mr. Monroy that he get Iwvhiah09 today; ctjhcbtde35 at least 8 weeks after tiuvjux52; and shingrix today and in 2-6 months.  He said that he likes to pass vaccines by you before getting them, so he plans to do this.  If he chooses to get them I told him that he can get them at clinic visits or from the pharmacy. Thanks!Eduard

## 2019-02-04 NOTE — LETTER
[address] [place] [address]    February 5, 2019    Bora Monroy  [address] [facility] [id number]          Dear Bora,    Your labs are normal.     Please let me know if you have any questions.     Enclosed is a copy of your results.     Results for orders placed or performed in visit on 02/04/19   Hepatic panel   Result Value Ref Range    Bilirubin Direct 0.1 0.0 - 0.2 mg/dL    Bilirubin Total 0.4 0.2 - 1.3 mg/dL    Albumin 4.0 3.4 - 5.0 g/dL    Protein Total 6.8 6.8 - 8.8 g/dL    Alkaline Phosphatase 81 40 - 150 U/L    ALT 38 0 - 70 U/L    AST 22 0 - 45 U/L   CRP inflammation   Result Value Ref Range    CRP Inflammation <2.9 0.0 - 8.0 mg/L   Erythrocyte sedimentation rate auto   Result Value Ref Range    Sed Rate 5 0 - 20 mm/h   Creatinine   Result Value Ref Range    Creatinine 0.90 0.66 - 1.25 mg/dL    GFR Estimate >90 >60 mL/min/[1.73_m2]    GFR Estimate If Black >90 >60 mL/min/[1.73_m2]   CBC with platelets differential   Result Value Ref Range    WBC 5.7 4.0 - 11.0 10e9/L    RBC Count 4.62 4.4 - 5.9 10e12/L    Hemoglobin 14.9 13.3 - 17.7 g/dL    Hematocrit 41.6 40.0 - 53.0 %    MCV 90 78 - 100 fl    MCH 32.3 26.5 - 33.0 pg    MCHC 35.8 31.5 - 36.5 g/dL    RDW 12.0 10.0 - 15.0 %    Platelet Count 221 150 - 450 10e9/L    % Neutrophils 60.1 %    % Lymphocytes 31.0 %    % Monocytes 7.7 %    % Eosinophils 0.7 %    % Basophils 0.5 %    Absolute Neutrophil 3.4 1.6 - 8.3 10e9/L    Absolute Lymphocytes 1.8 0.8 - 5.3 10e9/L    Absolute Monocytes 0.4 0.0 - 1.3 10e9/L    Absolute Eosinophils 0.0 0.0 - 0.7 10e9/L    Absolute Basophils 0.0 0.0 - 0.2 10e9/L    Diff Method Automated Method        If you have any questions or concerns, please call myself or my nurse at [phone].      Sincerely,        Eduard Mazariegos MD/faizan

## 2019-02-05 NOTE — RESULT ENCOUNTER NOTE
"Please mail Mr. Monroy his results with the following message.    \"Mr. Monroy,    Your labs are normal.    Please let me know if you have any questions.    Sincerely,  Eduard Mazariegos MD  2/5/2019 9:49 AM\"  "

## 2019-05-06 DIAGNOSIS — M05.79 SEROPOSITIVE RHEUMATOID ARTHRITIS OF MULTIPLE SITES (H): ICD-10-CM

## 2019-05-06 DIAGNOSIS — Z79.899 HIGH RISK MEDICATIONS (NOT ANTICOAGULANTS) LONG-TERM USE: ICD-10-CM

## 2019-05-06 LAB
BASOPHILS # BLD AUTO: 0 10E9/L (ref 0–0.2)
BASOPHILS NFR BLD AUTO: 0.3 %
DIFFERENTIAL METHOD BLD: NORMAL
EOSINOPHIL # BLD AUTO: 0 10E9/L (ref 0–0.7)
EOSINOPHIL NFR BLD AUTO: 0.7 %
ERYTHROCYTE [DISTWIDTH] IN BLOOD BY AUTOMATED COUNT: 12.2 % (ref 10–15)
HCT VFR BLD AUTO: 42.4 % (ref 40–53)
HGB BLD-MCNC: 14.6 G/DL (ref 13.3–17.7)
LYMPHOCYTES # BLD AUTO: 1.5 10E9/L (ref 0.8–5.3)
LYMPHOCYTES NFR BLD AUTO: 25.3 %
MCH RBC QN AUTO: 32.5 PG (ref 26.5–33)
MCHC RBC AUTO-ENTMCNC: 34.4 G/DL (ref 31.5–36.5)
MCV RBC AUTO: 94 FL (ref 78–100)
MONOCYTES # BLD AUTO: 0.5 10E9/L (ref 0–1.3)
MONOCYTES NFR BLD AUTO: 8.8 %
NEUTROPHILS # BLD AUTO: 3.8 10E9/L (ref 1.6–8.3)
NEUTROPHILS NFR BLD AUTO: 64.9 %
PLATELET # BLD AUTO: 204 10E9/L (ref 150–450)
RBC # BLD AUTO: 4.49 10E12/L (ref 4.4–5.9)
WBC # BLD AUTO: 5.8 10E9/L (ref 4–11)

## 2019-05-06 PROCEDURE — 36415 COLL VENOUS BLD VENIPUNCTURE: CPT | Performed by: INTERNAL MEDICINE

## 2019-05-06 PROCEDURE — 85025 COMPLETE CBC W/AUTO DIFF WBC: CPT | Performed by: INTERNAL MEDICINE

## 2019-05-06 PROCEDURE — 80076 HEPATIC FUNCTION PANEL: CPT | Performed by: INTERNAL MEDICINE

## 2019-05-06 PROCEDURE — 82565 ASSAY OF CREATININE: CPT | Performed by: INTERNAL MEDICINE

## 2019-05-07 LAB
ALBUMIN SERPL-MCNC: 4.2 G/DL (ref 3.4–5)
ALP SERPL-CCNC: 102 U/L (ref 40–150)
ALT SERPL W P-5'-P-CCNC: 37 U/L (ref 0–70)
AST SERPL W P-5'-P-CCNC: 17 U/L (ref 0–45)
BILIRUB DIRECT SERPL-MCNC: <0.1 MG/DL (ref 0–0.2)
BILIRUB SERPL-MCNC: 0.4 MG/DL (ref 0.2–1.3)
CREAT SERPL-MCNC: 0.92 MG/DL (ref 0.66–1.25)
GFR SERPL CREATININE-BSD FRML MDRD: >90 ML/MIN/{1.73_M2}
PROT SERPL-MCNC: 6.8 G/DL (ref 6.8–8.8)

## 2019-08-08 DIAGNOSIS — Z79.899 HIGH RISK MEDICATIONS (NOT ANTICOAGULANTS) LONG-TERM USE: ICD-10-CM

## 2019-08-08 DIAGNOSIS — M05.79 SEROPOSITIVE RHEUMATOID ARTHRITIS OF MULTIPLE SITES (H): ICD-10-CM

## 2019-08-08 LAB
ALBUMIN SERPL-MCNC: 4.2 G/DL (ref 3.4–5)
ALP SERPL-CCNC: 93 U/L (ref 40–150)
ALT SERPL W P-5'-P-CCNC: 30 U/L (ref 0–70)
AST SERPL W P-5'-P-CCNC: 19 U/L (ref 0–45)
BASOPHILS # BLD AUTO: 0 10E9/L (ref 0–0.2)
BASOPHILS NFR BLD AUTO: 0.4 %
BILIRUB DIRECT SERPL-MCNC: 0.1 MG/DL (ref 0–0.2)
BILIRUB SERPL-MCNC: 0.5 MG/DL (ref 0.2–1.3)
CREAT SERPL-MCNC: 0.87 MG/DL (ref 0.66–1.25)
CRP SERPL-MCNC: <2.9 MG/L (ref 0–8)
DIFFERENTIAL METHOD BLD: ABNORMAL
EOSINOPHIL # BLD AUTO: 0.1 10E9/L (ref 0–0.7)
EOSINOPHIL NFR BLD AUTO: 1.3 %
ERYTHROCYTE [DISTWIDTH] IN BLOOD BY AUTOMATED COUNT: 12 % (ref 10–15)
ERYTHROCYTE [SEDIMENTATION RATE] IN BLOOD BY WESTERGREN METHOD: 4 MM/H (ref 0–20)
GFR SERPL CREATININE-BSD FRML MDRD: >90 ML/MIN/{1.73_M2}
HCT VFR BLD AUTO: 41.2 % (ref 40–53)
HGB BLD-MCNC: 14.3 G/DL (ref 13.3–17.7)
LYMPHOCYTES # BLD AUTO: 1.5 10E9/L (ref 0.8–5.3)
LYMPHOCYTES NFR BLD AUTO: 31.3 %
MCH RBC QN AUTO: 32.6 PG (ref 26.5–33)
MCHC RBC AUTO-ENTMCNC: 34.7 G/DL (ref 31.5–36.5)
MCV RBC AUTO: 94 FL (ref 78–100)
MONOCYTES # BLD AUTO: 0.5 10E9/L (ref 0–1.3)
MONOCYTES NFR BLD AUTO: 9.4 %
NEUTROPHILS # BLD AUTO: 2.8 10E9/L (ref 1.6–8.3)
NEUTROPHILS NFR BLD AUTO: 57.6 %
PLATELET # BLD AUTO: 190 10E9/L (ref 150–450)
PROT SERPL-MCNC: 6.9 G/DL (ref 6.8–8.8)
RBC # BLD AUTO: 4.38 10E12/L (ref 4.4–5.9)
WBC # BLD AUTO: 4.8 10E9/L (ref 4–11)

## 2019-08-08 PROCEDURE — 80076 HEPATIC FUNCTION PANEL: CPT | Performed by: INTERNAL MEDICINE

## 2019-08-08 PROCEDURE — 86140 C-REACTIVE PROTEIN: CPT | Performed by: INTERNAL MEDICINE

## 2019-08-08 PROCEDURE — 85025 COMPLETE CBC W/AUTO DIFF WBC: CPT | Performed by: INTERNAL MEDICINE

## 2019-08-08 PROCEDURE — 36415 COLL VENOUS BLD VENIPUNCTURE: CPT | Performed by: INTERNAL MEDICINE

## 2019-08-08 PROCEDURE — 85652 RBC SED RATE AUTOMATED: CPT | Performed by: INTERNAL MEDICINE

## 2019-08-08 PROCEDURE — 82565 ASSAY OF CREATININE: CPT | Performed by: INTERNAL MEDICINE

## 2019-11-08 ENCOUNTER — TELEPHONE (OUTPATIENT)
Dept: FAMILY MEDICINE | Facility: CLINIC | Age: 59
End: 2019-11-08

## 2019-11-08 DIAGNOSIS — E78.5 HYPERLIPIDEMIA WITH TARGET LDL LESS THAN 130: Primary | ICD-10-CM

## 2019-11-08 DIAGNOSIS — R73.01 IMPAIRED FASTING GLUCOSE: ICD-10-CM

## 2019-11-08 NOTE — TELEPHONE ENCOUNTER
Reason for Call:  Other Orders for Blood work    Detailed comments: Patient is calling to request orders for blood work.  Patient states that he gets blood work done once a year.  He gets it done for both his rheumatologist and Dr Jones.  Patient is requesting that Dr Jones add his orders to the chart so he can make an appointment to get it done.  Please call him when the orders are placed.  He is going to set up a lab appointment once he knows the orders are in.  Phone Number Patient can be reached at: Cell number on file:    Telephone Information:   Mobile [phone]       Best Time: Any    Can we leave a detailed message on this number? YES    Call taken on 11/8/2019 at 8:16 AM by Esha Mina

## 2019-11-13 NOTE — TELEPHONE ENCOUNTER
TC attempted to reach patient on mobile number. No answer and no voicemail set up. Message left on home number that requested lab orders have been placed.

## 2019-11-19 DIAGNOSIS — E78.5 HYPERLIPIDEMIA WITH TARGET LDL LESS THAN 130: ICD-10-CM

## 2019-11-19 DIAGNOSIS — Z79.899 HIGH RISK MEDICATION USE: ICD-10-CM

## 2019-11-19 DIAGNOSIS — R73.01 IMPAIRED FASTING GLUCOSE: ICD-10-CM

## 2019-11-19 LAB
ALBUMIN SERPL-MCNC: 4.1 G/DL (ref 3.4–5)
ALP SERPL-CCNC: 82 U/L (ref 40–150)
ALT SERPL W P-5'-P-CCNC: 27 U/L (ref 0–70)
ANION GAP SERPL CALCULATED.3IONS-SCNC: 7 MMOL/L (ref 3–14)
AST SERPL W P-5'-P-CCNC: 16 U/L (ref 0–45)
BASOPHILS # BLD AUTO: 0 10E9/L (ref 0–0.2)
BASOPHILS NFR BLD AUTO: 0.5 %
BILIRUB DIRECT SERPL-MCNC: 0.2 MG/DL (ref 0–0.2)
BILIRUB SERPL-MCNC: 0.7 MG/DL (ref 0.2–1.3)
BUN SERPL-MCNC: 9 MG/DL (ref 7–30)
CALCIUM SERPL-MCNC: 9 MG/DL (ref 8.5–10.1)
CHLORIDE SERPL-SCNC: 102 MMOL/L (ref 94–109)
CHOLEST SERPL-MCNC: 243 MG/DL
CO2 SERPL-SCNC: 28 MMOL/L (ref 20–32)
CREAT SERPL-MCNC: 0.81 MG/DL (ref 0.66–1.25)
DIFFERENTIAL METHOD BLD: NORMAL
EOSINOPHIL # BLD AUTO: 0.1 10E9/L (ref 0–0.7)
EOSINOPHIL NFR BLD AUTO: 1.1 %
ERYTHROCYTE [DISTWIDTH] IN BLOOD BY AUTOMATED COUNT: 11.7 % (ref 10–15)
GFR SERPL CREATININE-BSD FRML MDRD: >90 ML/MIN/{1.73_M2}
GLUCOSE SERPL-MCNC: 102 MG/DL (ref 70–99)
HBA1C MFR BLD: 5 % (ref 0–5.6)
HCT VFR BLD AUTO: 41 % (ref 40–53)
HDLC SERPL-MCNC: 52 MG/DL
HGB BLD-MCNC: 14.6 G/DL (ref 13.3–17.7)
LDLC SERPL CALC-MCNC: 168 MG/DL
LYMPHOCYTES # BLD AUTO: 1.3 10E9/L (ref 0.8–5.3)
LYMPHOCYTES NFR BLD AUTO: 29.2 %
MCH RBC QN AUTO: 32.7 PG (ref 26.5–33)
MCHC RBC AUTO-ENTMCNC: 35.6 G/DL (ref 31.5–36.5)
MCV RBC AUTO: 92 FL (ref 78–100)
MONOCYTES # BLD AUTO: 0.3 10E9/L (ref 0–1.3)
MONOCYTES NFR BLD AUTO: 7.7 %
NEUTROPHILS # BLD AUTO: 2.7 10E9/L (ref 1.6–8.3)
NEUTROPHILS NFR BLD AUTO: 61.5 %
NONHDLC SERPL-MCNC: 191 MG/DL
PLATELET # BLD AUTO: 192 10E9/L (ref 150–450)
POTASSIUM SERPL-SCNC: 3.9 MMOL/L (ref 3.4–5.3)
PROT SERPL-MCNC: 6.9 G/DL (ref 6.8–8.8)
RBC # BLD AUTO: 4.46 10E12/L (ref 4.4–5.9)
SODIUM SERPL-SCNC: 137 MMOL/L (ref 133–144)
TRIGL SERPL-MCNC: 117 MG/DL
WBC # BLD AUTO: 4.4 10E9/L (ref 4–11)

## 2019-11-19 PROCEDURE — 36415 COLL VENOUS BLD VENIPUNCTURE: CPT | Performed by: INTERNAL MEDICINE

## 2019-11-19 PROCEDURE — 80048 BASIC METABOLIC PNL TOTAL CA: CPT | Performed by: INTERNAL MEDICINE

## 2019-11-19 PROCEDURE — 80061 LIPID PANEL: CPT | Performed by: INTERNAL MEDICINE

## 2019-11-19 PROCEDURE — 80076 HEPATIC FUNCTION PANEL: CPT | Performed by: INTERNAL MEDICINE

## 2019-11-19 PROCEDURE — 85025 COMPLETE CBC W/AUTO DIFF WBC: CPT | Performed by: INTERNAL MEDICINE

## 2019-11-19 PROCEDURE — 83036 HEMOGLOBIN GLYCOSYLATED A1C: CPT | Performed by: INTERNAL MEDICINE

## 2019-11-22 DIAGNOSIS — M05.79 SEROPOSITIVE RHEUMATOID ARTHRITIS OF MULTIPLE SITES (H): ICD-10-CM

## 2019-11-22 RX ORDER — LEFLUNOMIDE 20 MG/1
20 TABLET ORAL EVERY OTHER DAY
Qty: 45 TABLET | Refills: 0 | Status: SHIPPED | OUTPATIENT
Start: 2019-11-22 | End: 2020-02-17

## 2019-11-22 NOTE — RESULT ENCOUNTER NOTE
"RN: please call Mr. Monroy to relay the following information:    [employer] message sent:  \"Mr. Monroy,    Labs are normal. Leflunomide has been refilled. Rheumatology clinic follow-up required for future refills.     Sincerely,  Eduard Mazariegos MD  11/22/2019 5:22 PM\""

## 2019-11-25 ENCOUNTER — TELEPHONE (OUTPATIENT)
Dept: ENDOCRINOLOGY | Facility: CLINIC | Age: 59
End: 2019-11-25

## 2019-11-25 ENCOUNTER — TELEPHONE (OUTPATIENT)
Dept: RHEUMATOLOGY | Facility: CLINIC | Age: 59
End: 2019-11-25

## 2019-11-26 ENCOUNTER — TELEPHONE (OUTPATIENT)
Dept: RHEUMATOLOGY | Facility: CLINIC | Age: 59
End: 2019-11-26

## 2019-11-26 NOTE — TELEPHONE ENCOUNTER
Patient cannot get in to see you until 2-17-20 and would like a 3 month supply refill of Leflunomide until then.    Thank you.

## 2019-12-03 ENCOUNTER — OFFICE VISIT (OUTPATIENT)
Dept: FAMILY MEDICINE | Facility: CLINIC | Age: 59
End: 2019-12-03
Payer: COMMERCIAL

## 2019-12-03 VITALS
BODY MASS INDEX: 23.1 KG/M2 | HEIGHT: 71 IN | OXYGEN SATURATION: 97 % | WEIGHT: 165 LBS | DIASTOLIC BLOOD PRESSURE: 74 MMHG | TEMPERATURE: 97.7 F | HEART RATE: 66 BPM | SYSTOLIC BLOOD PRESSURE: 135 MMHG

## 2019-12-03 DIAGNOSIS — E78.5 HYPERLIPIDEMIA WITH TARGET LDL LESS THAN 130: ICD-10-CM

## 2019-12-03 DIAGNOSIS — L30.9 ECZEMA, UNSPECIFIED TYPE: ICD-10-CM

## 2019-12-03 DIAGNOSIS — R73.01 IMPAIRED FASTING GLUCOSE: ICD-10-CM

## 2019-12-03 DIAGNOSIS — M05.79 SEROPOSITIVE RHEUMATOID ARTHRITIS OF MULTIPLE SITES (H): ICD-10-CM

## 2019-12-03 DIAGNOSIS — I10 HYPERTENSION GOAL BP (BLOOD PRESSURE) < 140/90: ICD-10-CM

## 2019-12-03 DIAGNOSIS — Z00.00 ROUTINE GENERAL MEDICAL EXAMINATION AT A HEALTH CARE FACILITY: Primary | ICD-10-CM

## 2019-12-03 DIAGNOSIS — Z79.899 HIGH RISK MEDICATIONS (NOT ANTICOAGULANTS) LONG-TERM USE: ICD-10-CM

## 2019-12-03 DIAGNOSIS — M77.02 MEDIAL EPICONDYLITIS OF ELBOW, LEFT: ICD-10-CM

## 2019-12-03 PROCEDURE — 99396 PREV VISIT EST AGE 40-64: CPT | Performed by: FAMILY MEDICINE

## 2019-12-03 PROCEDURE — 99213 OFFICE O/P EST LOW 20 MIN: CPT | Mod: 25 | Performed by: FAMILY MEDICINE

## 2019-12-03 RX ORDER — TRIAMCINOLONE ACETONIDE 1 MG/G
CREAM TOPICAL
Qty: 60 G | Refills: 2 | Status: SHIPPED | OUTPATIENT
Start: 2019-12-03

## 2019-12-03 RX ORDER — AMLODIPINE BESYLATE 5 MG/1
TABLET ORAL
Qty: 90 TABLET | Refills: 3 | Status: SHIPPED | OUTPATIENT
Start: 2019-12-03 | End: 2020-12-07 | Stop reason: ALTCHOICE

## 2019-12-03 ASSESSMENT — ENCOUNTER SYMPTOMS
EYE PAIN: 0
DIZZINESS: 0
COUGH: 0
HEMATURIA: 0
HEMATOCHEZIA: 0
CONSTIPATION: 0
ABDOMINAL PAIN: 0
NERVOUS/ANXIOUS: 0
FEVER: 0
CHILLS: 0
DIARRHEA: 0
FREQUENCY: 0

## 2019-12-03 ASSESSMENT — MIFFLIN-ST. JEOR: SCORE: 1583.44

## 2019-12-03 NOTE — TELEPHONE ENCOUNTER
Tried calling patient again, still was unable to leave message.  Shonna Kenny CMA Rheumatology  12/3/2019 11:42 AM

## 2019-12-03 NOTE — TELEPHONE ENCOUNTER
Tried calling patient's cell number but no voice mailbox has been set up, then called home number but was not able to leave a message there either as it just kept ringing.  Shonna Kenny [facility] Rheumatology  12/3/2019 9:54 AM

## 2019-12-03 NOTE — TELEPHONE ENCOUNTER
Shonna: please call Mr. Monroy to see if he can come in today, we have openings.    Eduard Mazariegos MD  12/3/2019 8:10 AM

## 2019-12-03 NOTE — PROGRESS NOTES
"SUBJECTIVE:   CC: Bora Monroy is an 59 year old male who presents for a preventative health visit and to address some underlying health conditions.     Healthy Habits:     Getting at least 3 servings of Calcium per day:  Yes    Bi-annual eye exam:  NO    Dental care twice a year:  Yes    Sleep apnea or symptoms of sleep apnea:  None    Diet:  Low salt    Frequency of exercise:  2-3 days/week    Duration of exercise:  15-30 minutes    Taking medications regularly:  Yes    Medication side effects:  None    PHQ-2 Total Score: 0    Additional concerns today:  No    Other concerns:   Joint or Musculoskeletal Pain  Duration of complaint: 2 months left elbow.     He was doing a lot of lifting and carrying of objects a couple of months ago.  He developed some pain over the medial epicondyle of the left elbow.  He has a past history of \"tennis elbow\" with the elbows.  He went to physical therapy for that and received cortisone injections and it eventually got better.    He remains on leflunomide for rheumatoid arthritis.  He takes it every other day.  He is not taking any prednisone anymore.  He feels like his RA is under good control.  He is on amlodipine for hypertension.  He uses triamcinolone cream periodically for eczema.    Today's PHQ-2 Score:   PHQ-2 ( 1999 [employer]) 12/3/2019   Q1: Little interest or pleasure in doing things 0   Q2: Feeling down, depressed or hopeless 0   PHQ-2 Score 0   Q1: Little interest or pleasure in doing things Not at all   Q2: Feeling down, depressed or hopeless Not at all   PHQ-2 Score 0       Abuse: Current or Past(Physical, Sexual or Emotional)- No  Do you feel safe in your environment? Yes        Social History     Tobacco Use     Smoking status: Never Smoker     Smokeless tobacco: Never Used   Substance Use Topics     Alcohol use: Yes     Comment: rare         Alcohol Use 12/3/2019   Prescreen: >3 drinks/day or >7 drinks/week? No   Prescreen: >3 drinks/day or >7 drinks/week? - "       Last PSA:   PSA   Date Value Ref Range Status   10/16/2018 1.39 0 - 4 ug/L Final     Comment:     Assay Method:  Chemiluminescence using Siemens Vista analyzer       Reviewed orders with patient. Reviewed health maintenance and updated orders accordingly - Yes  Patient Active Problem List   Diagnosis     Presbyopias     Hyperopia     Hyperlipidemia with target LDL less than 130     Impaired fasting glucose     High risk medications (not anticoagulants) long-term use     Advanced directives, counseling/discussion     Seropositive rheumatoid arthritis of multiple sites (HCC)     Hypertension goal BP (blood pressure) < 140/90     Kidney stone     Past Surgical History:   Procedure Laterality Date     COLONOSCOPY  11/5/2010    COLONOSCOPY performed by FERMIN MCCLELLAND at [facility]     TONSILLECTOMY  1964    AGE 4       Social History     Tobacco Use     Smoking status: Never Smoker     Smokeless tobacco: Never Used   Substance Use Topics     Alcohol use: Yes     Comment: rare     Family History   Problem Relation Age of Onset     Lipids Sister      Diabetes Sister      Lipids Father      Hypertension Father      Cerebrovascular Disease Maternal Aunt      Cancer No family hx of          Current Outpatient Medications   Medication Sig Dispense Refill     amLODIPine (NORVASC) 5 MG tablet Take 1 tablet (5 mg) by mouth daily 90 tablet 3     Cholecalciferol (VITAMIN D PO) Take 1,000 Units by mouth       cinnamon 500 MG TABS Take 1 tablet by mouth daily        FISH OIL 1000 MG OR CAPS 1 tablet daily       KRILL OIL PO Take 1 tablet by mouth daily       leflunomide (ARAVA) 20 MG tablet Take 1 tablet (20 mg) by mouth every other day 45 tablet 0     Misc Natural Products (TURMERIC CURCUMIN) CAPS Take by mouth daily       MULTI-VITAMIN OR TABS 1 TABLET DAILY       psyllium (METAMUCIL) 30.9 % POWD Take 1 tsp by mouth 2 times daily.       triamcinolone (KENALOG) 0.1 % external cream Apply sparingly to affected area two to  "three times daily as needed for hand rash 60 g 2     VITAMIN C OR None Entered       VITAMIN E PO        No Known Allergies    Reviewed and updated as needed this visit by clinical staff  Tobacco  Allergies  Meds  Med Hx  Surg Hx  Fam Hx  Soc Hx        Reviewed and updated as needed this visit by Provider            Review of Systems   Constitutional: Negative for chills and fever.   HENT: Negative for congestion and ear pain.    Eyes: Negative for pain.   Respiratory: Negative for cough.    Cardiovascular: Negative for chest pain.   Gastrointestinal: Negative for abdominal pain, constipation, diarrhea and hematochezia.   Genitourinary: Negative for frequency and hematuria.   Neurological: Negative for dizziness.   Psychiatric/Behavioral: The patient is not nervous/anxious.        OBJECTIVE:   Ht 1.8 m (5' 10.87\")   Wt 74.8 kg (165 lb)   BMI 23.10 kg/m     /74 (BP Location: Left arm, Patient Position: Sitting, Cuff Size: Adult Regular)   Pulse 66   Temp 97.7  F (36.5  C) (Oral)   Ht 1.8 m (5' 10.87\")   Wt 74.8 kg (165 lb)   SpO2 97%   BMI 23.10 kg/m        Physical Exam  GENERAL: healthy, alert and no distress  EYES: Eyes grossly normal to inspection, PERRL and conjunctivae and sclerae normal  HENT: ear canals and TM's normal, nose and mouth without ulcers or lesions  NECK: no adenopathy, no asymmetry, masses, or scars and thyroid normal to palpation  RESP: lungs clear to auscultation - no rales, rhonchi or wheezes  CV: regular rate and rhythm, normal S1 S2, no S3 or S4, no murmur, click or rub, no peripheral edema and peripheral pulses strong  ABDOMEN: soft, nontender, no hepatosplenomegaly, no masses   MS: no gross musculoskeletal defects noted, no edema.  He has mild discomfort to palpation over the left medial epicondyle.  SKIN: no suspicious lesions or rashes  NEURO: Normal strength and tone, mentation intact and speech normal  PSYCH: mentation appears normal, affect " normal/bright    Diagnostic Test Results:  Labs reviewed in Epic  Orders Only on 11/19/2019   Component Date Value Ref Range Status     Bilirubin Direct 11/19/2019 0.2  0.0 - 0.2 mg/dL Final     Bilirubin Total 11/19/2019 0.7  0.2 - 1.3 mg/dL Final     Albumin 11/19/2019 4.1  3.4 - 5.0 g/dL Final     Protein Total 11/19/2019 6.9  6.8 - 8.8 g/dL Final     Alkaline Phosphatase 11/19/2019 82  40 - 150 U/L Final     ALT 11/19/2019 27  0 - 70 U/L Final     AST 11/19/2019 16  0 - 45 U/L Final     WBC 11/19/2019 4.4  4.0 - 11.0 10e9/L Final     RBC Count 11/19/2019 4.46  4.4 - 5.9 10e12/L Final     Hemoglobin 11/19/2019 14.6  13.3 - 17.7 g/dL Final     Hematocrit 11/19/2019 41.0  40.0 - 53.0 % Final     MCV 11/19/2019 92  78 - 100 fl Final     MCH 11/19/2019 32.7  26.5 - 33.0 pg Final     MCHC 11/19/2019 35.6  31.5 - 36.5 g/dL Final     RDW 11/19/2019 11.7  10.0 - 15.0 % Final     Platelet Count 11/19/2019 192  150 - 450 10e9/L Final     % Neutrophils 11/19/2019 61.5  % Final     % Lymphocytes 11/19/2019 29.2  % Final     % Monocytes 11/19/2019 7.7  % Final     % Eosinophils 11/19/2019 1.1  % Final     % Basophils 11/19/2019 0.5  % Final     Absolute Neutrophil 11/19/2019 2.7  1.6 - 8.3 10e9/L Final     Absolute Lymphocytes 11/19/2019 1.3  0.8 - 5.3 10e9/L Final     Absolute Monocytes 11/19/2019 0.3  0.0 - 1.3 10e9/L Final     Absolute Eosinophils 11/19/2019 0.1  0.0 - 0.7 10e9/L Final     Absolute Basophils 11/19/2019 0.0  0.0 - 0.2 10e9/L Final     Diff Method 11/19/2019 Automated Method   Final     Cholesterol 11/19/2019 243* <200 mg/dL Final    Desirable:       <200 mg/dl     Triglycerides 11/19/2019 117  <150 mg/dL Final    Fasting specimen     HDL Cholesterol 11/19/2019 52  >39 mg/dL Final     LDL Cholesterol Calculated 11/19/2019 168* <100 mg/dL Final    Comment: Above desirable:  100-129 mg/dl  Borderline High:  130-159 mg/dL  High:             160-189 mg/dL  Very high:       >189 mg/dl       Non HDL Cholesterol  11/19/2019 191* <130 mg/dL Final    Comment: Above Desirable:  130-159 mg/dl  Borderline high:  160-189 mg/dl  High:             190-219 mg/dl  Very high:       >219 mg/dl       Sodium 11/19/2019 137  133 - 144 mmol/L Final     Potassium 11/19/2019 3.9  3.4 - 5.3 mmol/L Final     Chloride 11/19/2019 102  94 - 109 mmol/L Final     Carbon Dioxide 11/19/2019 28  20 - 32 mmol/L Final     Anion Gap 11/19/2019 7  3 - 14 mmol/L Final     Glucose 11/19/2019 102* 70 - 99 mg/dL Final    Fasting specimen     Urea Nitrogen 11/19/2019 9  7 - 30 mg/dL Final     Creatinine 11/19/2019 0.81  0.66 - 1.25 mg/dL Final     GFR Estimate 11/19/2019 >90  >60 mL/min/[1.73_m2] Final    Comment: Non  GFR Calc  Starting 12/18/2018, serum creatinine based estimated GFR (eGFR) will be   calculated using the Chronic Kidney Disease Epidemiology Collaboration   (CKD-EPI) equation.       GFR Estimate If Black 11/19/2019 >90  >60 mL/min/[1.73_m2] Final    Comment:  GFR Calc  Starting 12/18/2018, serum creatinine based estimated GFR (eGFR) will be   calculated using the Chronic Kidney Disease Epidemiology Collaboration   (CKD-EPI) equation.       Calcium 11/19/2019 9.0  8.5 - 10.1 mg/dL Final     Hemoglobin A1C 11/19/2019 5.0  0 - 5.6 % Final    Comment: Normal <5.7% Prediabetes 5.7-6.4%  Diabetes 6.5% or higher - adopted from ADA   consensus guidelines.       The 10-year ASCVD risk score (Higginsambrocio HERBERT Jr., et al., 2013) is: 11.8%    Values used to calculate the score:      Age: 59 years      Sex: Male      Is Non- : No      Diabetic: No      Tobacco smoker: No      Systolic Blood Pressure: 135 mmHg      Is BP treated: Yes      HDL Cholesterol: 52 mg/dL      Total Cholesterol: 243 mg/dL      ASSESSMENT/PLAN:       ICD-10-CM    1. Routine general medical examination at a health care facility Z00.00    2. Hypertension goal BP (blood pressure) < 140/90 I10 amLODIPine (NORVASC) 5 MG tablet   3. Eczema,  "unspecified type L30.9 triamcinolone (KENALOG) 0.1 % external cream   4. Seropositive rheumatoid arthritis of multiple sites (HCC) M05.79    5. High risk medications (not anticoagulants) long-term use Z79.899    6. Impaired fasting glucose R73.01    7. Hyperlipidemia with target LDL less than 130 E78.5    8. Medial epicondylitis of elbow, left M77.02 [facility] REFERRAL     Blood pressure and other vital signs look acceptable  We will continue his same baseline meds  Discussed diet and exercise treatment for his lipids and glucose  I again advised him that he meets criteria for going on a statin, but he declines  He will continue care for his RA with Dr. Mazariegos of rheumatology  We discussed his medial epicondylitis and I will refer him to physical therapy for further treatment of that  Plan a recheck in 1 year, or sooner prn    COUNSELING:   Reviewed preventive health counseling, as reflected in patient instructions       Regular exercise       Healthy diet/nutrition    Estimated body mass index is 23.1 kg/m  as calculated from the following:    Height as of this encounter: 1.8 m (5' 10.87\").    Weight as of this encounter: 74.8 kg (165 lb).          reports that he has never smoked. He has never used smokeless tobacco.      Counseling Resources:  ATP IV Guidelines  Pooled Cohorts Equation Calculator  FRAX Risk Assessment  ICSI Preventive Guidelines  Dietary Guidelines for Americans, 2010  USDA's MyPlate  ASA Prophylaxis  Lung CA Screening    Yakov Jones MD  [facility]  "

## 2019-12-06 NOTE — TELEPHONE ENCOUNTER
Closing encounter, patient has an appointment 2/17/2019.    Shonna Kenny CMA Rheumatology  12/6/2019 10:00 AM

## 2019-12-10 NOTE — TELEPHONE ENCOUNTER
Pt has appointment set up.  Another encounter had been started and will close this one.    Bernadette Sykes CMA  12/10/2019  9:58 AM

## 2020-01-13 ENCOUNTER — THERAPY VISIT (OUTPATIENT)
Dept: OCCUPATIONAL THERAPY | Facility: CLINIC | Age: 60
End: 2020-01-13
Attending: FAMILY MEDICINE
Payer: COMMERCIAL

## 2020-01-13 DIAGNOSIS — M25.522 LEFT ELBOW PAIN: Primary | ICD-10-CM

## 2020-01-13 DIAGNOSIS — M77.02 MEDIAL EPICONDYLITIS OF ELBOW, LEFT: ICD-10-CM

## 2020-01-13 PROCEDURE — 97140 MANUAL THERAPY 1/> REGIONS: CPT | Mod: GO | Performed by: OCCUPATIONAL THERAPIST

## 2020-01-13 PROCEDURE — 97165 OT EVAL LOW COMPLEX 30 MIN: CPT | Mod: GO | Performed by: OCCUPATIONAL THERAPIST

## 2020-01-13 PROCEDURE — 97110 THERAPEUTIC EXERCISES: CPT | Mod: GO | Performed by: OCCUPATIONAL THERAPIST

## 2020-01-13 NOTE — PROGRESS NOTES
"Hand Therapy Initial Evaluation    Current Date:  1/13/2020    Subjective:  Bora (\"Jim\") SAUD Monroy is a 59 year old right hand dominant male.    Diagnosis:   Left elbow pain/MEP  DOI:  12/3/19 (therapy referral)    Patient reports symptoms of pain and weakness/loss of strength of the left elbow which occurred due to increased lifting/carrying 3 months ago. Since onset symptoms are gradually getting better.  Special tests:  none.  Previous treatment: self treatment with massage, heat and ice.  General health as reported by patient is good.  Pertinent medical history includes:High Blood Pressure, Rheumatoid Arthritis  Medical allergies:none.  Surgical history: none.  Medication history: High Blood Pressure, RA meds.    Occupational Profile Information:  Current occupation is [employer]   Currently working in normal job without restrictions  Job Tasks: Computer Work, Driving, Lifting, Carrying, Operating a Machine, Pushing, Pulling  Prior functional level:  independent-shared household chores  Barriers include:none  Mobility: No difficulty  Transportation: drives    Functional Outcome Measure:  Upper Extremity Functional Index  SCORE:   Column Totals: 78/80  (A lower score indicates greater disability.)    Objective:  Pain Level (Scale 0-10)   1/13/2020   At Rest 0   With Use 2   Worst 4-5     Pain Description  Date 1/13/2020   Location Medial elbow   Pain Quality Burning, Tender and sore   Frequency intermittent     Pain is worst Daytime    Exacerbated by Lifting   Relieved by Rest   Progression Gradually improving     Posture  Forward Neck Posture and Rounded Forward Shoulders    Sensation  WNL throughout all nerve distributions; per patient report    ROM  WNL's elbow, forearm and wrist with no pain.    Strength   (Measured in pounds)  Pain Report:  + mild    ++ moderate    +++ severe    1/13/2020 1/13/2020   Trials Right Left   1  2  3 128  122  120 126-  126-  123-   Average 123 125     Lat Pinch " 1/13/2020 1/13/2020   Trials Right Left   1  2  3 26  24  24 26-  26-  26-   Average 25 26     3 Pt Pinch 1/13/2020 1/13/2020   Trials Right Left   1  2  3 25  23  22 25-  24-  23-   Average 23 24     Resisted Testing  Pain Report:  - none    + mild    ++ moderate    +++ severe    1/13/2020   Elbow Extension + slight   Elbow Flexion -   Supination  -   Pronation -   Wrist Ext with RD, Elbow Ext -   Wrist Ext with UD, Elbow Ext -   Wrist Flex with RD, Elbow Ext +   Wrist Flex with UD, Elbow Ext -   FDS -     Palpation   Date 1/13/2020   Pec Major + slight   Bicep Muscle -   Distal Bicep Tendon -   Davin of [place] -   Cubital Tunnel  -   MEP + slight   Flexor Origin -   Flexor Wad -   Pronator Teres -     Assessment:  Patient presents with symptoms consistent with diagnosis of left elbow MEP, with conservative intervention.     Patient's limitations or Problem List includes:  Pain and Tightness in musculature of the left elbow which interferes with the patient's ability to perform Household Chores and Work Tasks as compared to previous level of function.    Rehab Potential:  Excellent - Return to full activity, no limitations    Patient will benefit from skilled Occupational Therapy to increase flexibility and overall strength and decrease pain to return to previous activity level and resume normal daily tasks and to reach their rehab potential.    Barriers to Learning:  No barrier    Communication Issues:  Patient appears to be able to clearly communicate and understand verbal and written communication and follow directions correctly.    Chart Review: Chart Review and Simple history review with patient    Identified Performance Deficits: home establishment and management and work    Assessment of Occupational Performance:  1-3 Performance Deficits    Clinical Decision Making (Complexity): Low complexity    Treatment Explanation:  The following has been discussed with the patient:  RX ordered/plan of  care  Anticipated outcomes  Possible risks and side effects    Plan:  Frequency:  1 X a month, once daily  Duration:  for 2 months    Treatment Plan:    Modalities:    US   Therapeutic Exercise:    PROM and Isotonics  Neuromuscular re-ed:   Posture and Kinesiotaping  Manual Techniques:   Friction massage and Myofascial release  Orthotic Fabrication:    Forearm based orthosis  Self Care:    Self Care Tasks and Ergonomic Considerations    Discharge Plan:  Achieve all LTG.  Independent in home treatment program.  Reach maximal therapeutic benefit.    Home Exercise Program:  Warmth for stiffness to flexors  Ice after activity for pain to MEP  TFM to MEP  MFR to flexors with tennis ball  Eccentric wrist flexion strengthening  Pec stretch in door way  Lift with elbows at sides and forearms neutral or palm down  Postural awareness, avoid rounds shoulders and forward head    Next Visit:  Hold chart open for one month, if no contact is received from patient, discharge will occur at that time with this note serving as the discharge summary.

## 2020-02-17 ENCOUNTER — OFFICE VISIT (OUTPATIENT)
Dept: RHEUMATOLOGY | Facility: CLINIC | Age: 60
End: 2020-02-17
Payer: COMMERCIAL

## 2020-02-17 VITALS
OXYGEN SATURATION: 99 % | BODY MASS INDEX: 23.88 KG/M2 | SYSTOLIC BLOOD PRESSURE: 142 MMHG | HEART RATE: 79 BPM | WEIGHT: 170.6 LBS | HEIGHT: 71 IN | DIASTOLIC BLOOD PRESSURE: 80 MMHG

## 2020-02-17 DIAGNOSIS — M05.79 SEROPOSITIVE RHEUMATOID ARTHRITIS OF MULTIPLE SITES (H): Primary | ICD-10-CM

## 2020-02-17 DIAGNOSIS — Z79.899 HIGH RISK MEDICATION USE: ICD-10-CM

## 2020-02-17 LAB
ALBUMIN SERPL-MCNC: 4.1 G/DL (ref 3.4–5)
ALP SERPL-CCNC: 86 U/L (ref 40–150)
ALT SERPL W P-5'-P-CCNC: 32 U/L (ref 0–70)
AST SERPL W P-5'-P-CCNC: 15 U/L (ref 0–45)
BASOPHILS # BLD AUTO: 0 10E9/L (ref 0–0.2)
BASOPHILS NFR BLD AUTO: 0.4 %
BILIRUB DIRECT SERPL-MCNC: <0.1 MG/DL (ref 0–0.2)
BILIRUB SERPL-MCNC: 0.4 MG/DL (ref 0.2–1.3)
CREAT SERPL-MCNC: 0.83 MG/DL (ref 0.66–1.25)
CRP SERPL-MCNC: <2.9 MG/L (ref 0–8)
DIFFERENTIAL METHOD BLD: NORMAL
EOSINOPHIL # BLD AUTO: 0 10E9/L (ref 0–0.7)
EOSINOPHIL NFR BLD AUTO: 0.6 %
ERYTHROCYTE [DISTWIDTH] IN BLOOD BY AUTOMATED COUNT: 12.5 % (ref 10–15)
ERYTHROCYTE [SEDIMENTATION RATE] IN BLOOD BY WESTERGREN METHOD: 4 MM/H (ref 0–20)
GFR SERPL CREATININE-BSD FRML MDRD: >90 ML/MIN/{1.73_M2}
HCT VFR BLD AUTO: 44.5 % (ref 40–53)
HGB BLD-MCNC: 15.4 G/DL (ref 13.3–17.7)
LYMPHOCYTES # BLD AUTO: 1.1 10E9/L (ref 0.8–5.3)
LYMPHOCYTES NFR BLD AUTO: 22.4 %
MCH RBC QN AUTO: 32.7 PG (ref 26.5–33)
MCHC RBC AUTO-ENTMCNC: 34.6 G/DL (ref 31.5–36.5)
MCV RBC AUTO: 95 FL (ref 78–100)
MONOCYTES # BLD AUTO: 0.4 10E9/L (ref 0–1.3)
MONOCYTES NFR BLD AUTO: 8 %
NEUTROPHILS # BLD AUTO: 3.2 10E9/L (ref 1.6–8.3)
NEUTROPHILS NFR BLD AUTO: 68.6 %
PLATELET # BLD AUTO: 221 10E9/L (ref 150–450)
PROT SERPL-MCNC: 7.1 G/DL (ref 6.8–8.8)
RBC # BLD AUTO: 4.71 10E12/L (ref 4.4–5.9)
WBC # BLD AUTO: 4.7 10E9/L (ref 4–11)

## 2020-02-17 PROCEDURE — 85025 COMPLETE CBC W/AUTO DIFF WBC: CPT | Performed by: INTERNAL MEDICINE

## 2020-02-17 PROCEDURE — 36415 COLL VENOUS BLD VENIPUNCTURE: CPT | Performed by: INTERNAL MEDICINE

## 2020-02-17 PROCEDURE — 85652 RBC SED RATE AUTOMATED: CPT | Performed by: INTERNAL MEDICINE

## 2020-02-17 PROCEDURE — 82565 ASSAY OF CREATININE: CPT | Performed by: INTERNAL MEDICINE

## 2020-02-17 PROCEDURE — 80076 HEPATIC FUNCTION PANEL: CPT | Performed by: INTERNAL MEDICINE

## 2020-02-17 PROCEDURE — 86140 C-REACTIVE PROTEIN: CPT | Performed by: INTERNAL MEDICINE

## 2020-02-17 PROCEDURE — 99213 OFFICE O/P EST LOW 20 MIN: CPT | Performed by: INTERNAL MEDICINE

## 2020-02-17 RX ORDER — LEFLUNOMIDE 20 MG/1
20 TABLET ORAL EVERY OTHER DAY
Qty: 45 TABLET | Refills: 0 | Status: SHIPPED | OUTPATIENT
Start: 2020-02-17 | End: 2020-07-22

## 2020-02-17 ASSESSMENT — MIFFLIN-ST. JEOR: SCORE: 1608.9

## 2020-02-17 NOTE — PROGRESS NOTES
Rheumatology Clinic Visit      Bora Monroy MRN# [id number]   YOB: 1960 Age: 59 year old      Date of visit: 2/17/20   PCP: Dr. Yakov Jones     Chief Complaint     Patient presents with:  Arthritis: RA. patient is doing good, no issues    Assessment and Plan     1. Seropositive rheumatoid arthritis (RF negative, CCP positive): Currently on leflunomide 20 mg every other day; he has not required prednisone since last seen.  Discussed need for toxicity monitoring compliance and clinic follow-up to safely continue medication and monitor for side effects and disease activity. Doing well and will maintain on his current medication.  - Continue leflunomide 20 mg every other day  - Labs today: CBC, Creatinine, Hepatic Panel, ESR, CRP  - Labs in 3 months: CBC, Creatinine, Hepatic Panel  - Labs in 6 months: CBC, Creatinine, Hepatic Panel, ESR, CRP     2. Elevated blood pressure: Jim to follow up with Primary Care provider regarding elevated blood pressure.     3.  Vaccinations: Vaccinations reviewed with Mr. Monroy.  Risks and benefits of vaccinations were discussed.    - Influenza: up to date  - [id number]: Advised receiving; refused by Mr. Monroy   - Uljoyauhb99: to receive at least 8 weeks after [id number] is administered; refused by Mr. Monroy  - Shingrix: Advised receiving; refused by Mr. Monroy     I explained that to safely continue medications, clinic follow up as recommended and labs as recommended are needed.  If clinic follow up and labs are not completed as directed then it will be up to the discretion of the prescribing provider to determine if a refill will be given.  If it is determined that a refill will not be given based on insufficient monitoring, such as having not been seen back in the rheumatology clinic for evaluation, then it is reasonable to discuss with your primary care provider where monitoring and refills may be considered.  I also explained that all patients followed in the  rheumatology clinic need to have a primary care provider.     Mr. Monroy verbalized agreement with and understanding of the rational for the diagnosis and treatment plan.  All questions were answered to best of my ability and the patient's satisfaction. Mr. Monroy was advised to contact the clinic with any questions that may arise after the clinic visit.      Thank you for involving me in the care of the patient    Return to clinic: 6 months, at the patient's request    HPI   Bora Monroy is a 59 year old male hyperlipidemia, and seropositive rheumatoid arthritis who is here for follow-up of RA.     2/4/2018: He reported that he went to get leflunomide from the pharmacy but then realized that his labs were overdue in his clinic visit was coming up.  Right now he is feeling great.  No joint pain.  No morning stiffness.  Tolerating leflunomide 20 mg every other day.  He has not used prednisone for the last 3 weeks.  Recently had pneumonia that required antibiotic treatment; feeling well now.    8/12/2018: No-show to scheduled appointment    Today, 2/17/2020: He reports that he is doing well.  No new issues.  No joint pain and no morning stiffness.  No gelling phenomenon.  No joint swelling.  He says that his arthritis does not limit any of his daily activities.    Denies fevers, chills, nausea, vomiting, constipation, diarrhea. No abdominal pain. No chest pain/pressure, palpitations, or shortness of breath. No oral or nasal sores. No neck pain. No rash. No LE swelling.  No photosensitivity or photophobia.  No sicca symptoms.  No eye pain or redness.     ROS   GEN: No fevers, chills, night sweats, or weight loss  SKIN: No itching, rashes, sores  HEENT:  No oral or nasal ulcers.  CV: No chest pain, pressure, palpitations, or dyspnea on exertion.  PULM: No SOB, wheeze, cough.  GI: No nausea, vomiting, constipation, diarrhea. No blood in stool. No abdominal pain.  : Recently had a kidney stone  MSK: See HPI.  NEURO:  No numbness, tingling, or weakness.  EXT: No LE swelling  PSYCH: negative    Active Problem List     Patient Active Problem List   Diagnosis     Presbyopias     Hyperopia     Hyperlipidemia with target LDL less than 130     Impaired fasting glucose     High risk medications (not anticoagulants) long-term use     Advanced directives, counseling/discussion     Seropositive rheumatoid arthritis of multiple sites (HCC)     Hypertension goal BP (blood pressure) < 140/90     Kidney stone     Medial epicondylitis of elbow, left     Left elbow pain     Past Medical History     Past Medical History:   Diagnosis Date     Hyperlipidemia LDL goal < 130     declines statins -- favorable CAC in 2/18     Hypertension goal BP (blood pressure) < 140/90 11/17/2015    NL stress test 4/1/16, med started 11/16     Impaired fasting glucose      Kidney stone 12/8/2016     Seropositive rheumatoid arthritis of multiple sites (H) 11/17/2015     Past Surgical History     Past Surgical History:   Procedure Laterality Date     COLONOSCOPY  11/5/2010    COLONOSCOPY performed by FERMIN MCCLELLAND at [facility]     TONSILLECTOMY  1964    AGE 4     Allergy   No Known Allergies  Current Medication List     Current Outpatient Medications   Medication Sig     amLODIPine (NORVASC) 5 MG tablet Take 1 tablet (5 mg) by mouth daily     Cholecalciferol (VITAMIN D PO) Take 1,000 Units by mouth     cinnamon 500 MG TABS Take 1 tablet by mouth daily      FISH OIL 1000 MG OR CAPS 1 tablet daily     KRILL OIL PO Take 1 tablet by mouth daily     leflunomide (ARAVA) 20 MG tablet Take 1 tablet (20 mg) by mouth every other day     Misc Natural Products (TURMERIC CURCUMIN) CAPS Take by mouth daily     MULTI-VITAMIN OR TABS 1 TABLET DAILY     psyllium (METAMUCIL) 30.9 % POWD Take 1 tsp by mouth 2 times daily.     triamcinolone (KENALOG) 0.1 % external cream Apply sparingly to affected area two to three times daily as needed for hand rash     VITAMIN C OR None Entered  "    VITAMIN E PO      No current facility-administered medications for this visit.          Social History     See HPI    Family History     Family History   Problem Relation Age of Onset     Lipids Sister      Diabetes Sister      Lipids Father      Hypertension Father      Cerebrovascular Disease Maternal Aunt      Cancer No family hx of        Physical Exam     Temp Readings from Last 3 Encounters:   12/03/19 97.7  F (36.5  C) (Oral)   02/04/19 97.8  F (36.6  C) (Oral)   11/27/18 97.5  F (36.4  C) (Oral)     BP Readings from Last 5 Encounters:   02/17/20 (!) 164/84   12/03/19 135/74   02/04/19 164/77   11/27/18 130/73   08/06/18 146/80     Pulse Readings from Last 1 Encounters:   02/17/20 79     Resp Readings from Last 1 Encounters:   08/06/18 16     Estimated body mass index is 23.88 kg/m  as calculated from the following:    Height as of this encounter: 1.8 m (5' 10.87\").    Weight as of this encounter: 77.4 kg (170 lb 9.6 oz).    GEN: NAD  HEENT: MMM. No oral lesions. Anicteric, noninjected sclera  CV: S1, S2. RRR. No m/r/g.  PULM: CTA bilaterally. No w/c.  MSK: MCPs, PIPs, DIPs, wrists, elbows, shoulders, knees, and ankles without swelling or tenderness to palpation.  Negative MCP and MTP squeeze.  Hips nontender to palpation.  SKIN: No rash  EXT: No LE edema  PSYCH: Alert. Appropriate.    Labs   RF/CCP  Recent Labs   Lab Test 07/05/12  1247   CCPABY 24*   RHF <7     CBC  Recent Labs   Lab Test 11/19/19  0941 08/08/19  0727 05/06/19  1045   WBC 4.4 4.8 5.8   RBC 4.46 4.38* 4.49   HGB 14.6 14.3 14.6   HCT 41.0 41.2 42.4   MCV 92 94 94   RDW 11.7 12.0 12.2    190 204   MCH 32.7 32.6 32.5   MCHC 35.6 34.7 34.4   NEUTROPHIL 61.5 57.6 64.9   LYMPH 29.2 31.3 25.3   MONOCYTE 7.7 9.4 8.8   EOSINOPHIL 1.1 1.3 0.7   BASOPHIL 0.5 0.4 0.3   ANEU 2.7 2.8 3.8   ALYM 1.3 1.5 1.5   MANDI 0.3 0.5 0.5   AEOS 0.1 0.1 0.0   ABAS 0.0 0.0 0.0     CMP  Recent Labs   Lab Test 11/19/19  0941 08/08/19  0727 05/06/19  1045  " 10/16/18  0757  10/17/17  0829     --   --   --  137  --  137   POTASSIUM 3.9  --   --   --  4.2  --  4.3   CHLORIDE 102  --   --   --  101  --  103   CO2 28  --   --   --  30  --  31   ANIONGAP 7  --   --   --  6  --  3   *  --   --   --  103*  --  106*   BUN 9  --   --   --  12  --  12   CR 0.81 0.87 0.92   < > 0.86   < > 0.96   GFRESTIMATED >90 >90 >90   < > >90   < > 80   GFRESTBLACK >90 >90 >90   < > >90   < > >90   MOLINA 9.0  --   --   --  8.8  --  9.0   BILITOTAL 0.7 0.5 0.4   < > 0.8   < > 0.7   ALBUMIN 4.1 4.2 4.2   < > 4.0   < > 4.1   PROTTOTAL 6.9 6.9 6.8   < > 6.9   < > 7.1   ALKPHOS 82 93 102   < > 78   < > 80   AST 16 19 17   < > 22   < > 14   ALT 27 30 37   < > 49   < > 27    < > = values in this interval not displayed.     Calcium/VitaminD  Recent Labs   Lab Test 11/19/19  0941 10/16/18  0757 10/17/17  0829  10/12/15  1439   MOLINA 9.0 8.8 9.0   < >  --    VITDT  --   --   --   --  46    < > = values in this interval not displayed.     ESR/CRP  Recent Labs   Lab Test 08/08/19  0727 02/04/19  1604 08/06/18  1608   SED 4 5 3   CRP <2.9 <2.9 <2.9     Hepatitis C  Recent Labs   Lab Test 08/07/12  1439   HCVAB Negative     Tuberculosis Screening  Recent Labs   Lab Test 08/07/12  1440   TBRSLT Negative   TBAGN 0.11     HIV Screening  Recent Labs   Lab Test 10/16/18  0757   HIAGAB Nonreactive       Immunization History     Immunization History   Administered Date(s) Administered     Influenza (IIV3) PF 10/29/2007, 10/13/2012, 10/15/2013     Influenza Vaccine IM > 6 months Valent IIV4 10/30/2014, 10/29/2015, 10/15/2016, 10/13/2017, 10/15/2018, 10/15/2019     Pneumococcal 23 valent 10/31/2002     TD (ADULT, 7+) 09/03/2004     TDAP Vaccine (Boostrix) 11/13/2014          Chart documentation done in part with Dragon Voice recognition Software. Although reviewed after completion, some word and grammatical error may remain.    Eduard Mazariegos MD

## 2020-02-17 NOTE — PATIENT INSTRUCTIONS
Rheumatology    Dr. Eduard Mazariegos       [facility] in [place]   (Monday)  [address]  [place] [address]       [facility] [place]   (Tuesday)  [address] [place] [address]    [facility] in [place]   (Wed., Thurs., and Friday)  [address] [place] [address]    Phone number: [phone]  Thank you for choosing [facility].  Shonna Kenny CMA

## 2020-02-18 PROBLEM — M25.522 LEFT ELBOW PAIN: Status: RESOLVED | Noted: 2020-01-13 | Resolved: 2020-02-18

## 2020-02-18 PROBLEM — M77.02 MEDIAL EPICONDYLITIS OF ELBOW, LEFT: Status: RESOLVED | Noted: 2020-01-13 | Resolved: 2020-02-18

## 2020-03-16 ENCOUNTER — TELEPHONE (OUTPATIENT)
Dept: RHEUMATOLOGY | Facility: CLINIC | Age: 60
End: 2020-03-16

## 2020-03-16 NOTE — TELEPHONE ENCOUNTER
Patient is calling to ask if provider if he should get the booster shots and should he come off his medication    Please call to advice  Thank you

## 2020-03-16 NOTE — TELEPHONE ENCOUNTER
Called patient and advised him wile there is no data on the influence of prednisone, DMARDs, or other drugs for rheumatic disease medications on COVID-19, the practice of interrupting therapy during episodes of infection remain the same. Patient is also wondering if Dr. Mazariegos recommends that he get a booster shot for his immune system. Forwarded to Dr. Mazariegos. Okay to call work/home phone.    Jona Mclaughlin RN....3/16/2020 1:47 PM

## 2020-03-18 NOTE — TELEPHONE ENCOUNTER
RN: please call Neil Porfirio. He should continue leflunomide.  What booster shot is he referring to?  Is he considering receiving vaccines?  If so then he should consider receiving [id number] and then [id number] at least 8 weeks later; shingrix is also recommended.     Eduard Mazariegos MD  3/18/2020 12:00 PM

## 2020-03-18 NOTE — TELEPHONE ENCOUNTER
Left message for patient to return call to clinic.    Jona Mclaughlin RN....3/18/2020 2:53 PM

## 2020-03-19 NOTE — TELEPHONE ENCOUNTER
Called and spoke with patient who is wondering if he should continue his rheumatic medications. RN advised that he continue to take his medications per Dr. Mazariegos and that while there is no data on the influence of prednisone, DMARDs, or other drugs for rheumatic disease medications on COVID-19, the practice of interrupting therapy during episodes of infection remain the same. Patient is also wondering about getting a booster shot and whether that would help him prevent getting COVID-19. RN advised that Dr. Mazariegos recommends that he get Afakplr97 and Shingrix.  Discussed that vaccinations are important and that includes vaccines for seasonal influenza, pneumonia, pertussis, and shingles. These will not prevent COVID-19, but may lessen the chance of a secondary infection and will prevent illnesses that could be confused with COVID-19. Patient verbalized understanding and has no further questions.    Jona Mclaughlin RN....3/19/2020 3:13 PM

## 2020-03-19 NOTE — TELEPHONE ENCOUNTER
2nd attempt to reach the patient was unsuccesful.  Left message for patient to return call to clinic.    Jona Mclaughlin RN....3/19/2020 12:12 PM

## 2020-03-27 ENCOUNTER — TELEPHONE (OUTPATIENT)
Dept: RHEUMATOLOGY | Facility: CLINIC | Age: 60
End: 2020-03-27

## 2020-03-27 NOTE — TELEPHONE ENCOUNTER
Reason for call:  Other   Patient called regarding (reason for call): call back  Additional comments: Patient claims that he can not work due to a supressed immune system. He needs a note stating this, please call back to advise.    Phone number to reach patient:  Home number on file [phone] (home)  Email: [email or web address]  Best Time:  any    Can we leave a detailed message on this number?  YES    Travel screening: Negative

## 2020-03-27 NOTE — TELEPHONE ENCOUNTER
Called patient and advised that we can write a letter discussing he has an immunocompromised state and is at greater risk of developing infection.  Advised that he would need to discuss work restrictions with his employer  Patient verbalized understanding and has no other questions.  Letter was written and patient will obtain from vera.    Jona Mclaughlin RN....3/27/2020 9:29 AM

## 2020-03-27 NOTE — LETTER
Patient:  Bora Monroy  :   1960  Address: [address] [place] [id number]    To Whom it may concern,    Bora Monroy is being followed at our rheumatology clinic for management of an autoimmune disease. He has an immunocompromised state that puts him at a higher risk for developing infection. Please take this into account when considering Jim's work schedule.      This letter is being provided at the request of Yao Monroy.    Thank you for your consideration.      Sincerely,      Jona RUBALCAVA RN/Eduard Mazariegos MD...3/27/2020 9:33 AM

## 2020-04-28 ENCOUNTER — OFFICE VISIT (OUTPATIENT)
Dept: FAMILY MEDICINE | Facility: CLINIC | Age: 60
End: 2020-04-28
Payer: COMMERCIAL

## 2020-04-28 VITALS
BODY MASS INDEX: 23.57 KG/M2 | RESPIRATION RATE: 16 BRPM | TEMPERATURE: 97.9 F | OXYGEN SATURATION: 98 % | HEART RATE: 64 BPM | HEIGHT: 72 IN | DIASTOLIC BLOOD PRESSURE: 86 MMHG | SYSTOLIC BLOOD PRESSURE: 143 MMHG | WEIGHT: 174 LBS

## 2020-04-28 DIAGNOSIS — H92.01 OTALGIA, RIGHT: Primary | ICD-10-CM

## 2020-04-28 PROCEDURE — 99213 OFFICE O/P EST LOW 20 MIN: CPT | Performed by: FAMILY MEDICINE

## 2020-04-28 RX ORDER — AMOXICILLIN 500 MG/1
1000 CAPSULE ORAL 2 TIMES DAILY
Qty: 28 CAPSULE | Refills: 1 | Status: SHIPPED | OUTPATIENT
Start: 2020-04-28 | End: 2020-06-23

## 2020-04-28 ASSESSMENT — MIFFLIN-ST. JEOR: SCORE: 1637.26

## 2020-04-28 NOTE — PROGRESS NOTES
SUBJECTIVE:  Bora Monroy, a 60 year old male scheduled an appointment to discuss the following issues:  Ear painR - x3 weeks. Started with popping noise. Ibuprofen helpful.   Was improving 2nd week - this week worse again. No discharge. No hearing changes. Balance ok. No underwater issues.   No AOM as adult. No cold symptoms. No flying. No sick contacts.   Working at home. left ear ok. ?no known teeth grinding. Dentist 5 months - teeth ok.   No chest pain or shortness of breath. No heating pad/ice. No changes with chewing but popping with moving jaw. Using q-tips daily.   Outside blood pressure reading. Kidneys ok. No sudafed.   amox ok in past.  Medical, social, surgical, and family histories reviewed.    ROS:  All other ROS negative.     OBJECTIVE:  BP (!) 143/86   Pulse 64   Temp 97.9  F (36.6  C) (Tympanic)   Resp 16   Ht 1.829 m (6')   Wt 78.9 kg (174 lb)   SpO2 98%   BMI 23.60 kg/m    EXAM:  GENERAL APPEARANCE: healthy, alert and no distress  EYES: EOMI,  PERRL  HENT: ear canals and TM's normal mild retraction right tm and mild tenderness at TMJ and nose and mouth without ulcers or lesions  NECK: no adenopathy, no asymmetry, masses, or scars and thyroid normal to palpation  RESP: lungs clear to auscultation - no rales, rhonchi or wheezes  CV: regular rates and rhythm, normal S1 S2, no S3 or S4 and no murmur, click or rub -  ABDOMEN:  soft, nontender, no HSM or masses and bowel sounds normal  MS: extremities normal- no gross deformities noted, no evidence of inflammation in joints, FROM in all extremities.  SKIN: no suspicious lesions or rashes  NEURO: Normal strength and tone, sensory exam grossly normal, mentation intact and speech normal  PSYCH: mentation appears normal and affect normal/bright  LYMPHATICS: No cervical or supraclavicular nodes    ASSESSMENT / PLAN:  (H92.01) Otalgia, right  (primary encounter diagnosis)  Comment: from ET dyfunction/TMJ or dental?  Plan: amoxicillin (AMOXIL) 500  MG capsule,         OTOLARYNGOLOGY REFERRAL        Heating pain. Ibuprofen over the counter 400-600mg TID prn with food. Follow-up ENT if not improving. Consider dentist too. Expected course and warning signs reviewed. Call/email with questions/concerns     Jovanni Schneider MD

## 2020-05-08 ENCOUNTER — OFFICE VISIT (OUTPATIENT)
Dept: AUDIOLOGY | Facility: CLINIC | Age: 60
End: 2020-05-08
Payer: COMMERCIAL

## 2020-05-08 ENCOUNTER — OFFICE VISIT (OUTPATIENT)
Dept: OTOLARYNGOLOGY | Facility: CLINIC | Age: 60
End: 2020-05-08
Payer: COMMERCIAL

## 2020-05-08 VITALS
WEIGHT: 172.2 LBS | DIASTOLIC BLOOD PRESSURE: 78 MMHG | SYSTOLIC BLOOD PRESSURE: 149 MMHG | OXYGEN SATURATION: 96 % | HEART RATE: 62 BPM | BODY MASS INDEX: 23.35 KG/M2 | TEMPERATURE: 97.7 F

## 2020-05-08 DIAGNOSIS — H92.01 OTALGIA, RIGHT: Primary | ICD-10-CM

## 2020-05-08 DIAGNOSIS — H90.3 SENSORINEURAL HEARING LOSS, BILATERAL: Primary | ICD-10-CM

## 2020-05-08 DIAGNOSIS — H92.01 OTALGIA, RIGHT: ICD-10-CM

## 2020-05-08 PROCEDURE — 92550 TYMPANOMETRY & REFLEX THRESH: CPT | Performed by: AUDIOLOGIST

## 2020-05-08 PROCEDURE — 99203 OFFICE O/P NEW LOW 30 MIN: CPT | Performed by: OTOLARYNGOLOGY

## 2020-05-08 PROCEDURE — 99207 ZZC NO CHARGE LOS: CPT | Performed by: AUDIOLOGIST

## 2020-05-08 PROCEDURE — 92557 COMPREHENSIVE HEARING TEST: CPT | Performed by: AUDIOLOGIST

## 2020-05-08 RX ORDER — SODIUM PHOSPHATE,MONO-DIBASIC 19G-7G/118
1 ENEMA (ML) RECTAL DAILY
COMMUNITY

## 2020-05-08 NOTE — PROGRESS NOTES
AUDIOLOGY REPORT:    Patient was referred from ENT by Dr. Braga for audiology evaluation. The patient reports that he has been having popping in his right ear for around a month and shortly after that started having pain below his ear into his jaw as well. He reports that he was seen by primary care and was treated with antibiotics and ibuprofen, which seemed to help. He reports that the pain came back after he was done with the medication. The patient reports a history of loud noise exposure in the past, generally without hearing protection. He reports minimal noise exposure currently with consistent use of hearing protection. The patient reports that he has a high frequency hearing loss that has been stable when he is tested annually at work. He reports that the ear pain is better today than it has usually been.    Testing:    Otoscopy:   Otoscopic exam indicates ears are clear of cerumen bilaterally     Tympanograms:    RIGHT: normal eardrum mobility     LEFT:   normal eardrum mobility    Reflexes (reported by stimulus ear):  RIGHT: Ipsilateral is present at normal levels  RIGHT: Contralateral is present at normal levels  LEFT:   Ipsilateral is present at normal levels  LEFT:   Contralateral is present at normal levels    Thresholds:   Pure Tone Thresholds assessed using conventional audiometry with good reliability from 250-8000 Hz bilaterally using insert earphones and circumaural headphones     RIGHT:  normal hearing sensitivity with mild sensorineural hearing loss restricted to 4951-4370 Hz    LEFT:    normal hearing sensitivity through 1000 Hz sloping to mild sensorineural hearing loss    Speech Reception Threshold:    RIGHT: 15 dB HL    LEFT:   10 dB HL  Results are in agreement with pure tone average.     Word Recognition Score:     RIGHT: 96% at 60 dB HL using NU-6 recorded word list.    LEFT:   100% at 60 dB HL using NU-6 recorded word list.    Discussed results with the patient.     Patient was returned  to ENT for follow up.     Eloy Lr, CCC-A  Licensed Audiologist #[id number]  5/8/2020

## 2020-05-08 NOTE — PATIENT INSTRUCTIONS
General Scheduling Information  To schedule your CT/MRI scan, please contact [place] [facility] at [phone]   [address] [place] [address]    To schedule your Surgery, please contact our Specialty Schedulers at [phone]    ENT Clinic Locations Clinic Hours Telephone Number     [facility]  [address] [place] [address]   Tuesday:       8:00am -- 4:00pm    Wednesday:  8:00am - 4:00pm   To schedule an appointment with   Dr. Braga,   please contact our   Specialty Scheduling Department at:     [phone]       [facility]  [address] [place] [address]   Friday:          8:00am - 4:00pm         Urgent Care Locations Clinic Hours Telephone Numbers     [facility] Gutierrez  [address] [place] [address]     Monday-Friday:     11:00pm - 9:00pm    Saturday-Sunday:  9:00am - 5:00pm   [phone]     [facility]  [address] [place] [address]     Monday-Friday:      5:00pm - 9:00pm     Saturday-Sunday:  9:00am - 5:00pm   [phone]

## 2020-05-08 NOTE — LETTER
5/8/2020         RE: Bora Monroy  [facility] [id number]        Dear Colleague,    Thank you for referring your patient, Bora Monroy, to the [facility]. Please see a copy of my visit note below.    I am seeing this patient in consultation for otalgia at the request of the provider Dr. Jovanni Schneider.    Chief Complaint - ear popping    History of Present Illness - Bora Monroy is a 60 year old male who presents with the new onset of ear popping. Some soreness, but not really painful. The patient describes this as some discomfort below ear and in front of ear.  He did bit down on a kernel 3 days prior. Came on with yawning. No pain with chewing. Tried ibuprofen and amoxicillin. Helped, but then came back. They have never had a history of ear disease in the past, no previous ear surgery or chronic ear infection as an adult. No upper respiratory infection. No vertigo. Some noise exposure in the past.     Past Medical History -   Patient Active Problem List   Diagnosis     Presbyopias     Hyperopia     Hyperlipidemia with target LDL less than 130     Impaired fasting glucose     High risk medications (not anticoagulants) long-term use     Advanced directives, counseling/discussion     Seropositive rheumatoid arthritis of multiple sites (HCC)     Hypertension goal BP (blood pressure) < 140/90     Kidney stone       Current Medications -   Current Outpatient Medications:      amLODIPine (NORVASC) 5 MG tablet, Take 1 tablet (5 mg) by mouth daily, Disp: 90 tablet, Rfl: 3     Cholecalciferol (VITAMIN D PO), Take 1,000 Units by mouth, Disp: , Rfl:      cinnamon 500 MG TABS, Take 1 tablet by mouth daily , Disp: , Rfl:      FISH OIL 1000 MG OR CAPS, 1 tablet daily, Disp: , Rfl:      glucosamine-chondroitin 500-400 MG CAPS per capsule, Take 1 capsule by mouth daily, Disp: , Rfl:      KRILL OIL PO, Take 1 tablet by mouth daily, Disp: , Rfl:      leflunomide (ARAVA) 20 MG  tablet, Take 1 tablet (20 mg) by mouth every other day, Disp: 45 tablet, Rfl: 0     Misc Natural Products (TURMERIC CURCUMIN) CAPS, Take by mouth daily, Disp: , Rfl:      MULTI-VITAMIN OR TABS, 1 TABLET DAILY, Disp: , Rfl:      psyllium (METAMUCIL) 30.9 % POWD, Take 1 tsp by mouth 2 times daily., Disp: , Rfl:      VITAMIN C OR, None Entered, Disp: , Rfl:      VITAMIN E PO, , Disp: , Rfl:      triamcinolone (KENALOG) 0.1 % external cream, Apply sparingly to affected area two to three times daily as needed for hand rash (Patient not taking: Reported on 5/8/2020), Disp: 60 g, Rfl: 2    Allergies -   Allergies   Allergen Reactions     Azithromycin Diarrhea       Social History -   Social History     Socioeconomic History     Marital status:      Spouse name: None     Number of children: None     Years of education: None     Highest education level: None   Occupational History     None   Social Needs     Financial resource strain: None     Food insecurity     Worry: None     Inability: None     Transportation needs     Medical: None     Non-medical: None   Tobacco Use     Smoking status: Never Smoker     Smokeless tobacco: Never Used   Substance and Sexual Activity     Alcohol use: Yes     Comment: rare     Drug use: No     Sexual activity: Yes     Partners: Female   Lifestyle     Physical activity     Days per week: None     Minutes per session: None     Stress: None   Relationships     Social connections     Talks on phone: None     Gets together: None     Attends Scientologist service: None     Active member of club or organization: None     Attends meetings of clubs or organizations: None     Relationship status: None     Intimate partner violence     Fear of current or ex partner: None     Emotionally abused: None     Physically abused: None     Forced sexual activity: None   Other Topics Concern     Parent/sibling w/ CABG, MI or angioplasty before 65F 55M? No   Social History Narrative     None       Family  History -   Family History   Problem Relation Age of Onset     Lipids Sister      Diabetes Sister      Lipids Father      Hypertension Father      Cerebrovascular Disease Maternal Aunt      Cancer No family hx of        Review of Systems - As per HPI and PMHx, otherwise 7 system review is negative.    Physical Exam  BP (!) 149/78 (BP Location: Left arm, Patient Position: Sitting, Cuff Size: Adult Regular)   Pulse 62   Temp 97.7  F (36.5  C) (Oral)   Wt 78.1 kg (172 lb 3.2 oz)   SpO2 96%   BMI 23.35 kg/m    General - The patient is in no distress.  Alert and oriented to person and place, answers questions and cooperates with examination appropriately.   Neurologic - CN II-XII are grossly intact, no focal neurologic deficits. HB 1 out of 6 bilaterally.  Voice and Breathing - The patient was breathing comfortably without the use of accessory muscles. There was no wheezing, stridor, or stertor.  The patients voice was clear and strong.  Eyes - Extraocular movements intact. Sclera were not icteric or injected, conjunctiva were pink and moist.  Ears - The tympanic membranes are normal in appearance, bony landmarks are intact.  No retraction, perforation, or masses. No fluid or purulence was seen in the external canal or the middle ear. No evidence of infection of the middle ear or external canal, cerumen was normal in appearance.  Mouth - Dentition in fair condition, no masses, ulcerations, or erosions noted on examination of mucosa. The tongue is mobile and midline, and the uvula is midline on elevation.  He does have some tenderness of the right TMJ.  Also some tenderness inferior to this.  I do not feel any parotid masses.  Throat - The walls of the oropharynx were smooth, symmetric, and had no lesions or ulcerations. The uvula was midline on elevation.    Neck - Palpation of the occipital, submental, submandibular, internal jugular chain, and supraclavicular nodes did not demonstrate any abnormal lymph nodes or  masses. Palpation of the thyroid was soft and smooth, with no nodules or goiter appreciated.  The trachea was mobile and midline.        A/P - Bora Monroy is a 60 year old male who presents with right-sided otalgia.  Based on today's history and physical exam, I can find no evidence of middle ear pathology or eustachian tube dysfunction.  At this point my primary diagnosis is of temporomandibular syndrome.  I have discussed the etiology of TMJ, and the reasons why referred pain can mimic symptoms of ear disease, headaches, and even sinusitis.  He had evidence of TMJ on the left in the distant past.  He also has rheumatoid arthritis.  Both of those are risk factors for TMJ.  I have given the patient an instructional sheet of things to be tried at home. Finally, I counseled the patient that should the therapy not help, or should the symptoms change, that they should return to me, or contact me for a referral to a TMJ specialist or possible CT scan.      Zeb Braga MD  Otolaryngology  [facility]      Again, thank you for allowing me to participate in the care of your patient.        Sincerely,        Zeb Braga MD

## 2020-05-08 NOTE — PROGRESS NOTES
I am seeing this patient in consultation for otalgia at the request of the provider Dr. Jovanni Schneider.    Chief Complaint - ear popping    History of Present Illness - Bora Monroy is a 60 year old male who presents with the new onset of ear popping. Some soreness, but not really painful. The patient describes this as some discomfort below ear and in front of ear.  He did bit down on a kernel 3 days prior. Came on with yawning. No pain with chewing. Tried ibuprofen and amoxicillin. Helped, but then came back. They have never had a history of ear disease in the past, no previous ear surgery or chronic ear infection as an adult. No upper respiratory infection. No vertigo. Some noise exposure in the past.     Past Medical History -   Patient Active Problem List   Diagnosis     Presbyopias     Hyperopia     Hyperlipidemia with target LDL less than 130     Impaired fasting glucose     High risk medications (not anticoagulants) long-term use     Advanced directives, counseling/discussion     Seropositive rheumatoid arthritis of multiple sites (HCC)     Hypertension goal BP (blood pressure) < 140/90     Kidney stone       Current Medications -   Current Outpatient Medications:      amLODIPine (NORVASC) 5 MG tablet, Take 1 tablet (5 mg) by mouth daily, Disp: 90 tablet, Rfl: 3     Cholecalciferol (VITAMIN D PO), Take 1,000 Units by mouth, Disp: , Rfl:      cinnamon 500 MG TABS, Take 1 tablet by mouth daily , Disp: , Rfl:      FISH OIL 1000 MG OR CAPS, 1 tablet daily, Disp: , Rfl:      glucosamine-chondroitin 500-400 MG CAPS per capsule, Take 1 capsule by mouth daily, Disp: , Rfl:      KRILL OIL PO, Take 1 tablet by mouth daily, Disp: , Rfl:      leflunomide (ARAVA) 20 MG tablet, Take 1 tablet (20 mg) by mouth every other day, Disp: 45 tablet, Rfl: 0     Misc Natural Products (TURMERIC CURCUMIN) CAPS, Take by mouth daily, Disp: , Rfl:      MULTI-VITAMIN OR TABS, 1 TABLET DAILY, Disp: , Rfl:      psyllium (METAMUCIL) 30.9 %  POWD, Take 1 tsp by mouth 2 times daily., Disp: , Rfl:      VITAMIN C OR, None Entered, Disp: , Rfl:      VITAMIN E PO, , Disp: , Rfl:      triamcinolone (KENALOG) 0.1 % external cream, Apply sparingly to affected area two to three times daily as needed for hand rash (Patient not taking: Reported on 5/8/2020), Disp: 60 g, Rfl: 2    Allergies -   Allergies   Allergen Reactions     Azithromycin Diarrhea       Social History -   Social History     Socioeconomic History     Marital status:      Spouse name: None     Number of children: None     Years of education: None     Highest education level: None   Occupational History     None   Social Needs     Financial resource strain: None     Food insecurity     Worry: None     Inability: None     Transportation needs     Medical: None     Non-medical: None   Tobacco Use     Smoking status: Never Smoker     Smokeless tobacco: Never Used   Substance and Sexual Activity     Alcohol use: Yes     Comment: rare     Drug use: No     Sexual activity: Yes     Partners: Female   Lifestyle     Physical activity     Days per week: None     Minutes per session: None     Stress: None   Relationships     Social connections     Talks on phone: None     Gets together: None     Attends Yazidi service: None     Active member of club or organization: None     Attends meetings of clubs or organizations: None     Relationship status: None     Intimate partner violence     Fear of current or ex partner: None     Emotionally abused: None     Physically abused: None     Forced sexual activity: None   Other Topics Concern     Parent/sibling w/ CABG, MI or angioplasty before 65F 55M? No   Social History Narrative     None       Family History -   Family History   Problem Relation Age of Onset     Lipids Sister      Diabetes Sister      Lipids Father      Hypertension Father      Cerebrovascular Disease Maternal Aunt      Cancer No family hx of        Review of Systems - As per HPI and PMHx,  otherwise 7 system review is negative.    Physical Exam  BP (!) 149/78 (BP Location: Left arm, Patient Position: Sitting, Cuff Size: Adult Regular)   Pulse 62   Temp 97.7  F (36.5  C) (Oral)   Wt 78.1 kg (172 lb 3.2 oz)   SpO2 96%   BMI 23.35 kg/m    General - The patient is in no distress.  Alert and oriented to person and place, answers questions and cooperates with examination appropriately.   Neurologic - CN II-XII are grossly intact, no focal neurologic deficits. HB 1 out of 6 bilaterally.  Voice and Breathing - The patient was breathing comfortably without the use of accessory muscles. There was no wheezing, stridor, or stertor.  The patients voice was clear and strong.  Eyes - Extraocular movements intact. Sclera were not icteric or injected, conjunctiva were pink and moist.  Ears - The tympanic membranes are normal in appearance, bony landmarks are intact.  No retraction, perforation, or masses. No fluid or purulence was seen in the external canal or the middle ear. No evidence of infection of the middle ear or external canal, cerumen was normal in appearance.  Mouth - Dentition in fair condition, no masses, ulcerations, or erosions noted on examination of mucosa. The tongue is mobile and midline, and the uvula is midline on elevation.  He does have some tenderness of the right TMJ.  Also some tenderness inferior to this.  I do not feel any parotid masses.  Throat - The walls of the oropharynx were smooth, symmetric, and had no lesions or ulcerations. The uvula was midline on elevation.    Neck - Palpation of the occipital, submental, submandibular, internal jugular chain, and supraclavicular nodes did not demonstrate any abnormal lymph nodes or masses. Palpation of the thyroid was soft and smooth, with no nodules or goiter appreciated.  The trachea was mobile and midline.    Audiogram - mild down-sloping sensorineural hearing loss at 3k and 4k. Type A tymps. Likely due to noise exposure. We discussed  noise precautions.     A/P - Bora SAUD Monroy is a 60 year old male who presents with right-sided otalgia.  Based on today's history and physical exam, I can find no evidence of middle ear pathology or eustachian tube dysfunction.  At this point my primary diagnosis is of temporomandibular syndrome.  I have discussed the etiology of TMJ, and the reasons why referred pain can mimic symptoms of ear disease, headaches, and even sinusitis.  He had evidence of TMJ on the left in the distant past.  He also has rheumatoid arthritis.  Both of those are risk factors for TMJ.  I have given the patient an instructional sheet of things to be tried at home. Finally, I counseled the patient that should the therapy not help, or should the symptoms change, that they should return to me, or contact me for a referral to a TMJ specialist or possible CT scan.      Zeb Braga MD  Otolaryngology  [facility]

## 2020-05-19 DIAGNOSIS — Z79.899 HIGH RISK MEDICATION USE: ICD-10-CM

## 2020-05-19 DIAGNOSIS — M05.79 SEROPOSITIVE RHEUMATOID ARTHRITIS OF MULTIPLE SITES (H): ICD-10-CM

## 2020-05-19 LAB
ALBUMIN SERPL-MCNC: 4.2 G/DL (ref 3.4–5)
ALP SERPL-CCNC: 82 U/L (ref 40–150)
ALT SERPL W P-5'-P-CCNC: 30 U/L (ref 0–70)
AST SERPL W P-5'-P-CCNC: 17 U/L (ref 0–45)
BASOPHILS # BLD AUTO: 0 10E9/L (ref 0–0.2)
BASOPHILS NFR BLD AUTO: 0.4 %
BILIRUB DIRECT SERPL-MCNC: 0.1 MG/DL (ref 0–0.2)
BILIRUB SERPL-MCNC: 0.5 MG/DL (ref 0.2–1.3)
CREAT SERPL-MCNC: 0.86 MG/DL (ref 0.66–1.25)
DIFFERENTIAL METHOD BLD: ABNORMAL
EOSINOPHIL # BLD AUTO: 0.1 10E9/L (ref 0–0.7)
EOSINOPHIL NFR BLD AUTO: 0.9 %
ERYTHROCYTE [DISTWIDTH] IN BLOOD BY AUTOMATED COUNT: 11.7 % (ref 10–15)
GFR SERPL CREATININE-BSD FRML MDRD: >90 ML/MIN/{1.73_M2}
HCT VFR BLD AUTO: 41 % (ref 40–53)
HGB BLD-MCNC: 14.4 G/DL (ref 13.3–17.7)
LYMPHOCYTES # BLD AUTO: 1.5 10E9/L (ref 0.8–5.3)
LYMPHOCYTES NFR BLD AUTO: 28.3 %
MCH RBC QN AUTO: 33.1 PG (ref 26.5–33)
MCHC RBC AUTO-ENTMCNC: 35.1 G/DL (ref 31.5–36.5)
MCV RBC AUTO: 94 FL (ref 78–100)
MONOCYTES # BLD AUTO: 0.4 10E9/L (ref 0–1.3)
MONOCYTES NFR BLD AUTO: 7.3 %
NEUTROPHILS # BLD AUTO: 3.4 10E9/L (ref 1.6–8.3)
NEUTROPHILS NFR BLD AUTO: 63.1 %
PLATELET # BLD AUTO: 211 10E9/L (ref 150–450)
PROT SERPL-MCNC: 6.7 G/DL (ref 6.8–8.8)
RBC # BLD AUTO: 4.35 10E12/L (ref 4.4–5.9)
WBC # BLD AUTO: 5.3 10E9/L (ref 4–11)

## 2020-05-19 PROCEDURE — 36415 COLL VENOUS BLD VENIPUNCTURE: CPT | Performed by: INTERNAL MEDICINE

## 2020-05-19 PROCEDURE — 85025 COMPLETE CBC W/AUTO DIFF WBC: CPT | Performed by: INTERNAL MEDICINE

## 2020-05-19 PROCEDURE — 80076 HEPATIC FUNCTION PANEL: CPT | Performed by: INTERNAL MEDICINE

## 2020-05-19 PROCEDURE — 82565 ASSAY OF CREATININE: CPT | Performed by: INTERNAL MEDICINE

## 2020-06-23 ENCOUNTER — ANCILLARY PROCEDURE (OUTPATIENT)
Dept: GENERAL RADIOLOGY | Facility: CLINIC | Age: 60
End: 2020-06-23
Attending: NURSE PRACTITIONER
Payer: COMMERCIAL

## 2020-06-23 ENCOUNTER — OFFICE VISIT (OUTPATIENT)
Dept: FAMILY MEDICINE | Facility: CLINIC | Age: 60
End: 2020-06-23
Payer: COMMERCIAL

## 2020-06-23 VITALS
OXYGEN SATURATION: 100 % | WEIGHT: 171.8 LBS | BODY MASS INDEX: 23.3 KG/M2 | DIASTOLIC BLOOD PRESSURE: 85 MMHG | TEMPERATURE: 98.3 F | SYSTOLIC BLOOD PRESSURE: 145 MMHG | HEART RATE: 69 BPM

## 2020-06-23 DIAGNOSIS — M79.672 PAIN OF LEFT HEEL: ICD-10-CM

## 2020-06-23 DIAGNOSIS — Z71.89 ADVANCE CARE PLANNING: ICD-10-CM

## 2020-06-23 DIAGNOSIS — M77.50 BONE SPUR OF FOOT: Primary | ICD-10-CM

## 2020-06-23 PROCEDURE — 99214 OFFICE O/P EST MOD 30 MIN: CPT | Performed by: NURSE PRACTITIONER

## 2020-06-23 PROCEDURE — 73630 X-RAY EXAM OF FOOT: CPT | Mod: LT

## 2020-06-23 NOTE — PROGRESS NOTES
Subjective     Bora Monroy is a 60 year old male who presents to clinic today for the following health issues:    HPI   Pain  Onset: 4 weeks ago- was running around in yard with grandkids- next day, heel pain started.  Went away.  Ryde around with grandkids the next week, symptoms returned and have persisted.    Description:   Location: left heel    Progression of Symptoms: same    Accompanying Signs & Symptoms:  Other symptoms: aching and sore up the inside of ankle    Precipitating factors:   Trauma or overuse: unsure, ran around back yard with grandkids and started hurting after.  Therapies Tried and outcome: Wearing tennis shoes with some relief, pressure points and some stretching. Tried strap from former plantars fascitis with no relief. Ice, heat, ibuprofen, massage.    Patient Active Problem List   Diagnosis     Presbyopias     Hyperopia     Hyperlipidemia with target LDL less than 130     Impaired fasting glucose     High risk medications (not anticoagulants) long-term use     Advanced directives, counseling/discussion     Seropositive rheumatoid arthritis of multiple sites (HCC)     Hypertension goal BP (blood pressure) < 140/90     Kidney stone     Past Surgical History:   Procedure Laterality Date     COLONOSCOPY  11/5/2010    COLONOSCOPY performed by FERMIN MCCLELLAND at [facility]     TONSILLECTOMY  1964    AGE 4       Social History     Tobacco Use     Smoking status: Never Smoker     Smokeless tobacco: Never Used   Substance Use Topics     Alcohol use: Yes     Comment: rare     Family History   Problem Relation Age of Onset     Lipids Sister      Diabetes Sister      Lipids Father      Hypertension Father      Cerebrovascular Disease Maternal Aunt      Cancer No family hx of          Current Outpatient Medications   Medication Sig Dispense Refill     amLODIPine (NORVASC) 5 MG tablet Take 1 tablet (5 mg) by mouth daily 90 tablet 3     Cholecalciferol (VITAMIN D PO) Take 1,000 Units by mouth        FISH OIL 1000 MG OR CAPS 1 tablet daily       glucosamine-chondroitin 500-400 MG CAPS per capsule Take 1 capsule by mouth daily       KRILL OIL PO Take 1 tablet by mouth daily       leflunomide (ARAVA) 20 MG tablet Take 1 tablet (20 mg) by mouth every other day 45 tablet 0     Misc Natural Products (TURMERIC CURCUMIN) CAPS Take by mouth daily       MULTI-VITAMIN OR TABS 1 TABLET DAILY       psyllium (METAMUCIL) 30.9 % POWD Take 1 tsp by mouth 2 times daily.       VITAMIN C OR None Entered       VITAMIN E PO        cinnamon 500 MG TABS Take 1 tablet by mouth daily        triamcinolone (KENALOG) 0.1 % external cream Apply sparingly to affected area two to three times daily as needed for hand rash (Patient not taking: Reported on 5/8/2020) 60 g 2     Allergies   Allergen Reactions     Azithromycin Diarrhea     Recent Labs   Lab Test 05/19/20  1454 02/17/20  0818 11/19/19  0941  10/16/18  0757  10/17/17  0829  06/13/12  1123   A1C  --   --  5.0  --  5.0  --  5.2   < >  --    LDL  --   --  168*  --  185*  --  157*   < >  --    HDL  --   --  52  --  51  --  51   < >  --    TRIG  --   --  117  --  116  --  106   < >  --    ALT 30 32 27   < > 49   < > 27   < > 14   CR 0.86 0.83 0.81   < > 0.86   < > 0.96   < > 0.89   GFRESTIMATED >90 >90 >90   < > >90   < > 80   < > 90   GFRESTBLACK >90 >90 >90   < > >90   < > >90   < > >90   POTASSIUM  --   --  3.9  --  4.2  --  4.3   < > 4.5   TSH  --   --   --   --   --   --   --   --  1.37    < > = values in this interval not displayed.      BP Readings from Last 3 Encounters:   06/23/20 (!) 145/85   05/08/20 (!) 149/78   04/28/20 (!) 143/86    Wt Readings from Last 3 Encounters:   06/23/20 77.9 kg (171 lb 12.8 oz)   05/08/20 78.1 kg (172 lb 3.2 oz)   04/28/20 78.9 kg (174 lb)                    Reviewed and updated as needed this visit by Provider  Tobacco  Allergies  Meds  Problems  Med Hx  Surg Hx  Fam Hx         Review of Systems   Constitutional, HEENT, cardiovascular,  pulmonary, GI, , musculoskeletal, neuro, skin, endocrine and psych systems are negative, except as otherwise noted.      Objective    BP (!) 145/85   Pulse 69   Temp 98.3  F (36.8  C) (Tympanic)   Wt 77.9 kg (171 lb 12.8 oz)   SpO2 100%   BMI 23.30 kg/m    Body mass index is 23.3 kg/m .  Physical Exam   GENERAL: healthy, alert and no distress  RESP: lungs clear to auscultation - no rales, rhonchi or wheezes  CV: regular rate and rhythm, normal S1 S2, no S3 or S4, no murmur, click or rub, no peripheral edema and peripheral pulses strong  MS: no gross musculoskeletal defects noted, no edema POSITIVE for pain with palpation of left heel. Normal ROM of foot- toes not cause pain.  PSYCH: mentation appears normal, affect normal/bright    Diagnostic Test Results:  Labs reviewed in Epic  See orders- xrays show bone spur at location of pain.         Assessment & Plan       ICD-10-CM    1. Bone spur of foot  M77.50 Orthopedic & Spine  Referral   2. Advance care planning  Z71.89    3. Pain of left heel  M79.672 XR Foot Left G/E 3 Views     Orthopedic & Spine  Referral          See Patient Instructions    Return in about 3 months (around 9/23/2020), or if symptoms worsen or fail to improve.    Gely White, [facility]

## 2020-06-23 NOTE — PATIENT INSTRUCTIONS
It does appear like you have a bone spur at the location of your pain and starting on your posterior heel.  I have placed an order for you to see podiatry to discuss treatment plan. Read below instructions.  Try to wear well supportive shoes/ cushioned shoes while walking at all times.      Patient Education     Heel Spurs    The plantar fascia is a thick, fibrous layer of tissue that covers the bones on the bottom of your foot. It holds the foot bones in an arched position. Plantar fasciitis is a painful swelling of the plantar fascia.  A heel spur is an overgrowth of bone where the plantar fascia attaches to the heel bone. The heel spur itself usually doesn t cause pain. However, the heel spur might be a sign of plantar fasciitis which may cause your foot pain.  Plantar fasciitis can develop slowly or suddenly. It usually affects one foot at a time. Heel pain can feel sharp, like a knife sticking into the bottom of your foot. You may feel pain after exercising, long-distance jogging, stair climbing, long periods of standing, or after standing up.  Risk factors for plantar fasciitis include: arthritis, diabetes, obesity or recent weight gain, flat foot, and having high arches. Wearing high heels, loose shoes, or shoes with poor arch support adds to the risk.    Foot pain is usually worse in the morning. But it improves with walking. By the end of the day there may be a dull aching. Treatment includes short-term rest and controlling inflammation. It may take up to 9 months before all symptoms go away. In rare cases, a steroid injection in the foot, or surgery, may be needed.  Home care    If you are overweight, lose weight to help healing.    Choose supportive shoes with good arch support and shock absorbency. Replace athletic shoes when they become worn out. Don t walk or run barefoot.    Premade or custom-fitted shoe inserts may be helpful. Inserts made of silicone seem to be the most effective. Custom-made  inserts can be provided by a foot specialist, physical therapist, or orthopedist.    Premade or custom-made night splints keep the heel stretched out while you sleep. They may prevent morning pain.    Don't do activities that stress the feet: jogging, prolonged standing or walking, contact sports, etc.    First thing in the morning and before sports, stretch the bottom of your foot. Gently flex your ankle so the toes move toward your knee.    Icing may help control heel pain. Apply an ice pack to the heel for 10 to 20 minutes as a preventive. Or ice your heel after a severe flare-up of symptoms. You may repeat this every 1 to 2 hours as needed.    You may use over-the-counter pain medicine to control pain, unless another medicine was prescribed. Anti-inflammatory pain medicines, such as ibuprofen or naproxen, may work better than acetaminophen. If you have chronic liver or kidney disease or ever had a stomach ulcer or gastrointestinal bleeding, talk with your healthcare provider before using these medicines.    Shoe inserts, a night splint, or a special boot may be needed. Use these as directed by your healthcare provider.  Follow-up care   Follow up with your healthcare provider, physical therapist, or foot specialist as advised.  When to seek medical advice  Call your healthcare provider right away if any of these occur:    The pain worsens.    There is no relief after a few weeks of home treatment.  Date Last Reviewed: 5/1/2018 2000-2019 [employer]. [address] [place] [id number]. All rights reserved. This information is not intended as a substitute for professional medical care. Always follow your healthcare professional's instructions.

## 2020-06-25 NOTE — RESULT ENCOUNTER NOTE
Results discussed directly with patient while patient was present. Any further details documented in the note.   Gely White NP

## 2020-07-07 ENCOUNTER — OFFICE VISIT (OUTPATIENT)
Dept: PODIATRY | Facility: CLINIC | Age: 60
End: 2020-07-07
Payer: COMMERCIAL

## 2020-07-07 VITALS
WEIGHT: 170 LBS | HEART RATE: 78 BPM | DIASTOLIC BLOOD PRESSURE: 82 MMHG | SYSTOLIC BLOOD PRESSURE: 142 MMHG | BODY MASS INDEX: 23.06 KG/M2

## 2020-07-07 DIAGNOSIS — M21.6X1 PRONATION OF BOTH FEET: ICD-10-CM

## 2020-07-07 DIAGNOSIS — M21.6X2 PRONATION OF BOTH FEET: ICD-10-CM

## 2020-07-07 DIAGNOSIS — M72.2 PLANTAR FASCIITIS: Primary | ICD-10-CM

## 2020-07-07 PROCEDURE — 99203 OFFICE O/P NEW LOW 30 MIN: CPT | Performed by: PODIATRIST

## 2020-07-07 NOTE — NURSING NOTE
Weight management plan: Patient was referred to their PCP to discuss a diet and exercise plan.     Jim to follow up with Primary Care provider regarding elevated blood pressure.

## 2020-07-07 NOTE — PATIENT INSTRUCTIONS
We wish you continued good healing. If you have any questions or concerns, please do not hesitate to contact us at [phone]    Please remember to call and schedule a follow up appointment if one was recommended at your earliest convenience.   PODIATRY CLINIC HOURS  TELEPHONE NUMBER    Dr. Anderson Osborne D.P.[facility]    Clinics:  [facility]    [facility]   Tuesday 1PM-6PM  [place]  Wednesday 7AM-2PM  [facility]  Thursday 10AM-6PM  [place]  Friday 7AM-3PM  [place]  Specialty schedulers:   [phone] to make an appointment with any Specialty Provider.        Urgent Care locations:    [facility] Monday-Friday 5 pm - 9 pm. Saturday-Sunday 9 am -5pm    Monday-Friday 11 am - 9 pm Saturday 9 am - 5 pm     Monday-Sunday 12 noon-8PM [phone]     If you need a medication refill, please contact us you may need lab work and/or a follow up visit prior to your refill (i.e. Antifungal medications).    [facility] (secure e-mail communication and access to your chart) to send a message or to make an appointment.    If MRI needed please call [place] at [phone]

## 2020-07-07 NOTE — LETTER
7/7/2020         RE: Bora Monroy  [address] [address]        Dear Colleague,    Thank you for referring your patient, Bora Monroy, to the [facility]. Please see a copy of my visit note below.    Subjective:    Patient is seen today in consult from Gely White with a 6 week(s) hx of left heel pain.  Points to the plantarmedial calcaneal tubercle.  Most painful upon rising in a.m. or after prolonged sitting.  Aggravated by activity and relieved by rest.  This started when patient was running around the yard with [employer].  The next day had heel pain but it went away.  A week later he was running with his [email or web address] and the symptoms returned and have persisted.  He works as a .  Since he is immunocompromise he can only going on weekends.  He is at home a lot.  Has tried changing shoewear, OTC inserts, without much success.  Denies erythema, edema, ecchymosis, numbness, loss of strength.    ROS:  A 10-point review of systems was performed and is positive for that noted in the HPI and as seen below.  All other areas are negative.        Allergies   Allergen Reactions     Azithromycin Diarrhea       Current Outpatient Medications   Medication Sig Dispense Refill     amLODIPine (NORVASC) 5 MG tablet Take 1 tablet (5 mg) by mouth daily 90 tablet 3     Cholecalciferol (VITAMIN D PO) Take 1,000 Units by mouth       cinnamon 500 MG TABS Take 1 tablet by mouth daily        FISH OIL 1000 MG OR CAPS 1 tablet daily       glucosamine-chondroitin 500-400 MG CAPS per capsule Take 1 capsule by mouth daily       KRILL OIL PO Take 1 tablet by mouth daily       leflunomide (ARAVA) 20 MG tablet Take 1 tablet (20 mg) by mouth every other day 45 tablet 0     Misc Natural Products (TURMERIC CURCUMIN) CAPS Take by mouth daily       MULTI-VITAMIN OR TABS 1 TABLET DAILY       psyllium (METAMUCIL) 30.9 % POWD Take 1 tsp by mouth 2 times daily.        triamcinolone (KENALOG) 0.1 % external cream Apply sparingly to affected area two to three times daily as needed for hand rash (Patient not taking: Reported on 5/8/2020) 60 g 2     VITAMIN C OR None Entered       VITAMIN E PO          Patient Active Problem List   Diagnosis     Presbyopias     Hyperopia     Hyperlipidemia with target LDL less than 130     Impaired fasting glucose     High risk medications (not anticoagulants) long-term use     Advanced directives, counseling/discussion     Seropositive rheumatoid arthritis of multiple sites (HCC)     Hypertension goal BP (blood pressure) < 140/90     Kidney stone       Past Medical History:   Diagnosis Date     Hyperlipidemia LDL goal < 130     declines statins -- favorable CAC in 2/18     Hypertension goal BP (blood pressure) < 140/90 11/17/2015    NL stress test 4/1/16, med started 11/16     Impaired fasting glucose      Kidney stone 12/8/2016     Seropositive rheumatoid arthritis of multiple sites (H) 11/17/2015       Past Surgical History:   Procedure Laterality Date     COLONOSCOPY  11/5/2010    COLONOSCOPY performed by FERMIN MCCLELLAND at [facility]     TONSILLECTOMY  1964    AGE 4       Family History   Problem Relation Age of Onset     Lipids Sister      Diabetes Sister      Lipids Father      Hypertension Father      Cerebrovascular Disease Maternal Aunt      Cancer No family hx of        Social History     Tobacco Use     Smoking status: Never Smoker     Smokeless tobacco: Never Used   Substance Use Topics     Alcohol use: Yes     Comment: rare         Objective:    Vitals: BP (!) 142/82   Pulse 78   Wt 77.1 kg (170 lb)   BMI 23.06 kg/m    BMI: Body mass index is 23.06 kg/m .  Height: Data Unavailable    Constitutional/ general:  Pt is in no apparent distress, appears well-nourished.  Cooperative with history and physical exam.     Psych:  The patient answered questions appropriately.  Normal affect.  Seems to have reasonable expectations, in terms of  treatment.     Eyes:  Visual scanning/ tracking without deficit.     Ears:  Response to auditory stimuli is normal.  No hearing aid devices.  Auricles in proper alignment.     Lymphatic:  Popliteal lymph nodes not enlarged.     Lungs:  Non labored breathing, non labored speech. No cough.  No audible wheezing. Even, quiet breathing.       Vascular:  Pedal pulses are palpable bilaterally for both the DP and PT arteries.  CFT < 3 sec.  No edema.  Pedal hair growth noted.     Neuro:  Alert and oriented x 3. Coordinated gait.  Light touch sensation is intact to the L4, L5, S1 distributions. No obvious deficits.  No evidence of neurological-based weakness, spasticity, or contracture in the lower extremities.     Derm: Normal texture and turgor.  No erythema, ecchymosis, or cyanosis.  No open lesions.     Musculoskeletal:    Lower extremity muscle strength is normal.  Patient is ambulatory without an assistive device or brace.  No gross deformities.     Pronated arch with weightbearing.   ROM is within normal limits.  Negative tinel's sign.  No side to side compression pain of the calcaneus.  Pain upon palpation to the left plantarmedial  of the calcaneus.  No erythema, edema, ecchymosis, or subcutaneous masses noted.  No pain on palpation or stressing any tendons.  Negative Tinel's sign      Radiographic Exam:  X-Ray Findings:  I personally reviewed the films.  Normal    Assessment:  Plantar Fasciitis left foot     Plan:  X-rays from past personally reviewed.  Discussed etiology and treatment options with the patient.  The potential causes and nature of plantar fasciitis were discussed with the patient.  We reviewed the natural history/prognosis of the condition and risks if left untreated.  These include chronic pain, other sites of pain due to gait changes, and potential plantar fascial rupture.      We discussed possible causes of the condition as it relates to the patients specific situation.      Conservative  treatment options were reviewed:  appropriate shoes, avoidance of barefoot walking, inserts/orthoses, stretching, ice, massage, immobilization and NSAIDs.     We also reviewed the options of injection therapy and surgery.  However, it was made clear that surgery is only considered when conservative therapy fails.  The risks and benefits of injection therapy, and surgery were discussed.  Dispensed handout.       After thorough discussion and answering all questions, the patient elected to modifying activities, supportive shoes, ice, stretching, and not going barefoot.  Good house shoes at all times and I made recommendations.   I evaluated patient's tennis shoes.  There is somewhat worn and poor quality.  He will get better quality tennis shoes.  I made suggestions.  He will try over-the-counter arch supports.  We also discussed orthotics.  It may be good for him to get these however there $500 a pair.  He will call me if he like a pair.  We gave him a heel cup.  Discussed other treatment options if not resolving.  RTC PRN.  Thank you for allowing me to participate in the care of this patient.      Anderson Osborne DPM DPM, FACFAS      Again, thank you for allowing me to participate in the care of your patient.        Sincerely,        Anderson Osborne DPM

## 2020-07-08 NOTE — PROGRESS NOTES
Subjective:    Patient is seen today in consult from Gely White with a 6 week(s) hx of left heel pain.  Points to the plantarmedial calcaneal tubercle.  Most painful upon rising in a.m. or after prolonged sitting.  Aggravated by activity and relieved by rest.  This started when patient was running around the yard with grandkids.  The next day had heel pain but it went away.  A week later he was running with his grandkids and the symptoms returned and have persisted.  He works as a .  Since he is immunocompromise he can only going on weekends.  He is at home a lot.  Has tried changing shoewear, OTC inserts, without much success.  Denies erythema, edema, ecchymosis, numbness, loss of strength.    ROS:  A 10-point review of systems was performed and is positive for that noted in the HPI and as seen below.  All other areas are negative.        Allergies   Allergen Reactions     Azithromycin Diarrhea       Current Outpatient Medications   Medication Sig Dispense Refill     amLODIPine (NORVASC) 5 MG tablet Take 1 tablet (5 mg) by mouth daily 90 tablet 3     Cholecalciferol (VITAMIN D PO) Take 1,000 Units by mouth       cinnamon 500 MG TABS Take 1 tablet by mouth daily        FISH OIL 1000 MG OR CAPS 1 tablet daily       glucosamine-chondroitin 500-400 MG CAPS per capsule Take 1 capsule by mouth daily       KRILL OIL PO Take 1 tablet by mouth daily       leflunomide (ARAVA) 20 MG tablet Take 1 tablet (20 mg) by mouth every other day 45 tablet 0     Misc Natural Products (TURMERIC CURCUMIN) CAPS Take by mouth daily       MULTI-VITAMIN OR TABS 1 TABLET DAILY       psyllium (METAMUCIL) 30.9 % POWD Take 1 tsp by mouth 2 times daily.       triamcinolone (KENALOG) 0.1 % external cream Apply sparingly to affected area two to three times daily as needed for hand rash (Patient not taking: Reported on 5/8/2020) 60 g 2     VITAMIN C OR None Entered       VITAMIN E PO          Patient Active Problem List   Diagnosis      Presbyopias     Hyperopia     Hyperlipidemia with target LDL less than 130     Impaired fasting glucose     High risk medications (not anticoagulants) long-term use     Advanced directives, counseling/discussion     Seropositive rheumatoid arthritis of multiple sites (HCC)     Hypertension goal BP (blood pressure) < 140/90     Kidney stone       Past Medical History:   Diagnosis Date     Hyperlipidemia LDL goal < 130     declines statins -- favorable CAC in 2/18     Hypertension goal BP (blood pressure) < 140/90 11/17/2015    NL stress test 4/1/16, med started 11/16     Impaired fasting glucose      Kidney stone 12/8/2016     Seropositive rheumatoid arthritis of multiple sites (H) 11/17/2015       Past Surgical History:   Procedure Laterality Date     COLONOSCOPY  11/5/2010    COLONOSCOPY performed by FERMIN MCCLELLAND at [facility]     TONSILLECTOMY  1964    AGE 4       Family History   Problem Relation Age of Onset     Lipids Sister      Diabetes Sister      Lipids Father      Hypertension Father      Cerebrovascular Disease Maternal Aunt      Cancer No family hx of        Social History     Tobacco Use     Smoking status: Never Smoker     Smokeless tobacco: Never Used   Substance Use Topics     Alcohol use: Yes     Comment: rare         Objective:    Vitals: BP (!) 142/82   Pulse 78   Wt 77.1 kg (170 lb)   BMI 23.06 kg/m    BMI: Body mass index is 23.06 kg/m .  Height: Data Unavailable    Constitutional/ general:  Pt is in no apparent distress, appears well-nourished.  Cooperative with history and physical exam.     Psych:  The patient answered questions appropriately.  Normal affect.  Seems to have reasonable expectations, in terms of treatment.     Eyes:  Visual scanning/ tracking without deficit.     Ears:  Response to auditory stimuli is normal.  No hearing aid devices.  Auricles in proper alignment.     Lymphatic:  Popliteal lymph nodes not enlarged.     Lungs:  Non labored breathing, non labored speech.  No cough.  No audible wheezing. Even, quiet breathing.       Vascular:  Pedal pulses are palpable bilaterally for both the DP and PT arteries.  CFT < 3 sec.  No edema.  Pedal hair growth noted.     Neuro:  Alert and oriented x 3. Coordinated gait.  Light touch sensation is intact to the L4, L5, S1 distributions. No obvious deficits.  No evidence of neurological-based weakness, spasticity, or contracture in the lower extremities.     Derm: Normal texture and turgor.  No erythema, ecchymosis, or cyanosis.  No open lesions.     Musculoskeletal:    Lower extremity muscle strength is normal.  Patient is ambulatory without an assistive device or brace.  No gross deformities.     Pronated arch with weightbearing.   ROM is within normal limits.  Negative tinel's sign.  No side to side compression pain of the calcaneus.  Pain upon palpation to the left plantarmedial  of the calcaneus.  No erythema, edema, ecchymosis, or subcutaneous masses noted.  No pain on palpation or stressing any tendons.  Negative Tinel's sign      Radiographic Exam:  X-Ray Findings:  I personally reviewed the films.  Normal    Assessment:  Plantar Fasciitis left foot     Plan:  X-rays from past personally reviewed.  Discussed etiology and treatment options with the patient.  The potential causes and nature of plantar fasciitis were discussed with the patient.  We reviewed the natural history/prognosis of the condition and risks if left untreated.  These include chronic pain, other sites of pain due to gait changes, and potential plantar fascial rupture.      We discussed possible causes of the condition as it relates to the patients specific situation.      Conservative treatment options were reviewed:  appropriate shoes, avoidance of barefoot walking, inserts/orthoses, stretching, ice, massage, immobilization and NSAIDs.     We also reviewed the options of injection therapy and surgery.  However, it was made clear that surgery is only considered when  conservative therapy fails.  The risks and benefits of injection therapy, and surgery were discussed.  Dispensed handout.       After thorough discussion and answering all questions, the patient elected to modifying activities, supportive shoes, ice, stretching, and not going barefoot.  Good house shoes at all times and I made recommendations.   I evaluated patient's tennis shoes.  There is somewhat worn and poor quality.  He will get better quality tennis shoes.  I made suggestions.  He will try over-the-counter arch supports.  We also discussed orthotics.  It may be good for him to get these however there $500 a pair.  He will call me if he like a pair.  We gave him a heel cup.  Discussed other treatment options if not resolving.  RTC PRN.  Thank you for allowing me to participate in the care of this patient.      Anderson Osborne, TONYA CASTANEDA, FACFAS

## 2020-07-13 ENCOUNTER — TELEPHONE (OUTPATIENT)
Dept: RHEUMATOLOGY | Facility: CLINIC | Age: 60
End: 2020-07-13

## 2020-07-13 NOTE — LETTER
[facility]  [address] [place] [address]  Phone: [phone]  Fax: [phone]    July 13, 2020      To whom it may concern,    Bora SAUD Porfirio is being followed at the [facility] clinic for management of an autoimmune disease. He has an immunocompromised state that puts him at a higher risk for developing infection. Please take this into account when considering Jim's work schedule.      This letter is being provided at the request of Yao Monroy.    Thank you for your consideration.      Sincerely,          MADELIN Heaton MD   Rheumatology

## 2020-07-13 NOTE — TELEPHONE ENCOUNTER
Patient calling. He needs an updated letter for his job. States it can be identical to the one that was done last time, the date just needs updated. He would like it emailed to the same email address it was sent last time.

## 2020-07-21 DIAGNOSIS — M05.79 SEROPOSITIVE RHEUMATOID ARTHRITIS OF MULTIPLE SITES (H): ICD-10-CM

## 2020-07-21 NOTE — TELEPHONE ENCOUNTER
Patient states his pharmacy has requested a refill on leflunomide (ARAVA) 20 MG tablet twice now and he needs a refill    Thank you.

## 2020-07-21 NOTE — TELEPHONE ENCOUNTER
Medication:   Leflunomide  Last written on:   2/17/2020  Quantity:   45    Refills:   0    Last office visit:   2/17/2020  Next office visit:   8/17/2020  Last labs:   5/19/2020    Shonna Kenny CMA Rheumatology  7/21/2020 11:21 AM

## 2020-07-22 RX ORDER — LEFLUNOMIDE 20 MG/1
20 TABLET ORAL EVERY OTHER DAY
Qty: 45 TABLET | Refills: 1 | Status: SHIPPED | OUTPATIENT
Start: 2020-07-22 | End: 2021-01-11

## 2020-07-22 NOTE — TELEPHONE ENCOUNTER
Rheumatology team: Please call to notify Mr. Monroy that leflunomide has been refilled.  Eduard Mazariegos MD  7/22/2020 6:54 AM

## 2020-08-17 ENCOUNTER — VIRTUAL VISIT (OUTPATIENT)
Dept: RHEUMATOLOGY | Facility: CLINIC | Age: 60
End: 2020-08-17
Payer: COMMERCIAL

## 2020-08-17 DIAGNOSIS — M72.2 PLANTAR FASCIITIS OF LEFT FOOT: ICD-10-CM

## 2020-08-17 DIAGNOSIS — M05.79 SEROPOSITIVE RHEUMATOID ARTHRITIS OF MULTIPLE SITES (H): Primary | ICD-10-CM

## 2020-08-17 DIAGNOSIS — Z79.899 HIGH RISK MEDICATION USE: ICD-10-CM

## 2020-08-17 PROCEDURE — 99214 OFFICE O/P EST MOD 30 MIN: CPT | Mod: TEL | Performed by: INTERNAL MEDICINE

## 2020-08-17 NOTE — PROGRESS NOTES
"Bora Monroy is a 60 year old male who is being evaluated via a billable telephone visit.      The patient has been notified of following:     \"This telephone visit will be conducted via a call between you and your physician/provider. We have found that certain health care needs can be provided without the need for a physical exam.  This service lets us provide the care you need with a short phone conversation.  If a prescription is necessary we can send it directly to your pharmacy.  If lab work is needed we can place an order for that and you can then stop by our lab to have the test done at a later time.    Telephone visits are billed at different rates depending on your insurance coverage. During this emergency period, for some insurers they may be billed the same as an in-person visit.  Please reach out to your insurance provider with any questions.    If during the course of the call the physician/provider feels a telephone visit is not appropriate, you will not be charged for this service.\"    Patient has given verbal consent for Telephone visit?  Yes    What phone number would you like to be contacted at? [phone]    How would you like to obtain your AVS? [facility]    Rheumatology Telephone/Telehealth  Visit      Bora Monroy MRN# [id number]   YOB: 1960 Age: 60 year old      Date of visit: 8/17/20   PCP: Dr. Yakov Jones     Chief Complaint     Patient presents with:  Arthritis: RA. Doing good. Left heel on foot is the only problem    Assessment and Plan     1. Seropositive rheumatoid arthritis (RF negative, CCP positive): Currently on leflunomide 20 mg every other day; he has not required prednisone since last seen.  Discussed need for toxicity monitoring compliance and clinic follow-up to safely continue medication and monitor for side effects and disease activity. Doing well and will maintain on his current medication.  - Continue leflunomide 20 mg every other day  - Labs within 1 " week and every 8-12 weeks: CBC, Creatinine, Hepatic Panel, ESR, CRP     2.  Vaccinations: Vaccinations reviewed with Mr. Monroy.  Risks and benefits of vaccinations were discussed.    - Influenza: encouraged yearly vaccination  - [id number]: Advised receiving  - Detpclyea18: to receive at least 8 weeks after [id number] is administered  - Shingrix: Advised receiving    # Relevant labs and imaging were reviewed with the patient    # High risk medication toxicity monitoring: discussion and labs reviewed; appropriate labs ordered. See above.  Instructed that if confirmed to have COVID-19 or exposure to someone with confirmed COVID-19 to call this clinic for directions on DMARD management.    # Note that this is a virtual visit to reduce the risk of COVID-19 exposure during this current pandemic.      # Considered to be at high risk of complications from the COVID-19 virus.  It is recommended to limit contact with other people and if possible to work remotely or provide a leave of absence to reduce the risk for COVID-19.      I explained that to safely continue medications, clinic follow up as recommended and labs as recommended are needed.  If clinic follow up and labs are not completed as directed then it will be up to the discretion of the prescribing provider to determine if a refill will be given.  If it is determined that a refill will not be given based on insufficient monitoring, such as having not been seen back in the rheumatology clinic for evaluation, then it is reasonable to discuss with your primary care provider where monitoring and refills may be considered.  I also explained that all patients followed in the rheumatology clinic need to have a primary care provider.     Mr. Monroy verbalized agreement with and understanding of the rational for the diagnosis and treatment plan.  All questions were answered to best of my ability and the patient's satisfaction. Mr. Monroy was advised to contact the clinic with  any questions that may arise after the clinic visit.      Thank you for involving me in the care of the patient    Return to clinic: 6 months (pt preference)    HPI   Bora VILLAVICENCIO Porfirio is a 60 year old male hyperlipidemia, and seropositive rheumatoid arthritis who is here for follow-up of RA.     2/4/2018: He reported that he went to get leflunomide from the pharmacy but then realized that his labs were overdue in his clinic visit was coming up.  Right now he is feeling great.  No joint pain.  No morning stiffness.  Tolerating leflunomide 20 mg every other day.  He has not used prednisone for the last 3 weeks.  Recently had pneumonia that required antibiotic treatment; feeling well now.    8/12/2018: No-show to scheduled appointment    2/17/2020: He reports that he is doing well.  No new issues.  No joint pain and no morning stiffness.  No gelling phenomenon.  No joint swelling.  He says that his arthritis does not limit any of his daily activities.    Today, 8/17/2020: left heel pain with any walking; went to the clinic where it was x-rayed and bone spurs found; seen by podiatry; changed his shoes. Left heel pain for 3 weeks prior to eval on 6/23/2020. Dx'd with plantar fasciitis. Using ibuprofen 400-600mg BID PRN but not taken every day.     Denies fevers, chills, nausea, vomiting, constipation, diarrhea. No abdominal pain. No chest pain/pressure, palpitations, or shortness of breath. No oral or nasal sores. No neck pain. No rash. No LE swelling.  No photosensitivity or photophobia.  No sicca symptoms.  No eye pain or redness.     ROS   GEN: No fevers, chills, night sweats, or weight loss  SKIN: No itching, rashes, sores  HEENT:  No oral or nasal ulcers.  CV: No chest pain, pressure, palpitations, or dyspnea on exertion.  PULM: No SOB, wheeze, cough.  GI: No nausea, vomiting, constipation, diarrhea. No blood in stool. No abdominal pain.  : Recently had a kidney stone  MSK: See HPI.  NEURO: No numbness, tingling,  or weakness.  EXT: No LE swelling  PSYCH: negative    Active Problem List     Patient Active Problem List   Diagnosis     Presbyopias     Hyperopia     Hyperlipidemia with target LDL less than 130     Impaired fasting glucose     High risk medications (not anticoagulants) long-term use     Advanced directives, counseling/discussion     Seropositive rheumatoid arthritis of multiple sites (HCC)     Hypertension goal BP (blood pressure) < 140/90     Kidney stone     Past Medical History     Past Medical History:   Diagnosis Date     Hyperlipidemia LDL goal < 130     declines statins -- favorable CAC in 2/18     Hypertension goal BP (blood pressure) < 140/90 11/17/2015    NL stress test 4/1/16, med started 11/16     Impaired fasting glucose      Kidney stone 12/8/2016     Seropositive rheumatoid arthritis of multiple sites (H) 11/17/2015     Past Surgical History     Past Surgical History:   Procedure Laterality Date     COLONOSCOPY  11/5/2010    COLONOSCOPY performed by FERMIN MCCLELLAND at [facility]     TONSILLECTOMY  1964    AGE 4     Allergy     Allergies   Allergen Reactions     Azithromycin Diarrhea     Current Medication List     Current Outpatient Medications   Medication Sig     amLODIPine (NORVASC) 5 MG tablet Take 1 tablet (5 mg) by mouth daily     Cholecalciferol (VITAMIN D PO) Take 1,000 Units by mouth     cinnamon 500 MG TABS Take 1 tablet by mouth daily      FISH OIL 1000 MG OR CAPS 1 tablet daily     glucosamine-chondroitin 500-400 MG CAPS per capsule Take 1 capsule by mouth daily     KRILL OIL PO Take 1 tablet by mouth daily     leflunomide (ARAVA) 20 MG tablet Take 1 tablet (20 mg) by mouth every other day     MULTI-VITAMIN OR TABS 1 TABLET DAILY     psyllium (METAMUCIL) 30.9 % POWD Take 1 tsp by mouth 2 times daily.     VITAMIN C OR None Entered     VITAMIN E PO      Misc Natural Products (TURMERIC CURCUMIN) CAPS Take by mouth daily     triamcinolone (KENALOG) 0.1 % external cream Apply sparingly to  affected area two to three times daily as needed for hand rash (Patient not taking: Reported on 5/8/2020)     No current facility-administered medications for this visit.      Social History     See HPI    Family History     Family History   Problem Relation Age of Onset     Lipids Sister      Diabetes Sister      Lipids Father      Hypertension Father      Cerebrovascular Disease Maternal Aunt      Cancer No family hx of        Physical Exam     Temp Readings from Last 3 Encounters:   06/23/20 98.3  F (36.8  C) (Tympanic)   05/08/20 97.7  F (36.5  C) (Oral)   04/28/20 97.9  F (36.6  C) (Tympanic)     BP Readings from Last 5 Encounters:   07/07/20 (!) 142/82   06/23/20 (!) 145/85   05/08/20 (!) 149/78   04/28/20 (!) 143/86   02/17/20 (!) 142/80     Pulse Readings from Last 1 Encounters:   07/07/20 78     Resp Readings from Last 1 Encounters:   04/28/20 16     Estimated body mass index is 23.06 kg/m  as calculated from the following:    Height as of 4/28/20: 1.829 m (6').    Weight as of 7/7/20: 77.1 kg (170 lb).    Telephone Visit     Labs   RF/CCP  Recent Labs   Lab Test 07/05/12  1247   CCPABY 24*   RHF <7     CBC  Recent Labs   Lab Test 05/19/20  1454 02/17/20  0818 11/19/19  0941   WBC 5.3 4.7 4.4   RBC 4.35* 4.71 4.46   HGB 14.4 15.4 14.6   HCT 41.0 44.5 41.0   MCV 94 95 92   RDW 11.7 12.5 11.7    221 192   MCH 33.1* 32.7 32.7   MCHC 35.1 34.6 35.6   NEUTROPHIL 63.1 68.6 61.5   LYMPH 28.3 22.4 29.2   MONOCYTE 7.3 8.0 7.7   EOSINOPHIL 0.9 0.6 1.1   BASOPHIL 0.4 0.4 0.5   ANEU 3.4 3.2 2.7   ALYM 1.5 1.1 1.3   MANDI 0.4 0.4 0.3   AEOS 0.1 0.0 0.1   ABAS 0.0 0.0 0.0     CMP  Recent Labs   Lab Test 05/19/20  1454 02/17/20  0818 11/19/19  0941  10/16/18  0757  10/17/17  0829   NA  --   --  137  --  137  --  137   POTASSIUM  --   --  3.9  --  4.2  --  4.3   CHLORIDE  --   --  102  --  101  --  103   CO2  --   --  28  --  30  --  31   ANIONGAP  --   --  7  --  6  --  3   GLC  --   --  102*  --  103*  --  106*    BUN  --   --  9  --  12  --  12   CR 0.86 0.83 0.81   < > 0.86   < > 0.96   GFRESTIMATED >90 >90 >90   < > >90   < > 80   GFRESTBLACK >90 >90 >90   < > >90   < > >90   MOLINA  --   --  9.0  --  8.8  --  9.0   BILITOTAL 0.5 0.4 0.7   < > 0.8   < > 0.7   ALBUMIN 4.2 4.1 4.1   < > 4.0   < > 4.1   PROTTOTAL 6.7* 7.1 6.9   < > 6.9   < > 7.1   ALKPHOS 82 86 82   < > 78   < > 80   AST 17 15 16   < > 22   < > 14   ALT 30 32 27   < > 49   < > 27    < > = values in this interval not displayed.     Calcium/VitaminD  Recent Labs   Lab Test 11/19/19  0941 10/16/18  0757 10/17/17  0829  10/12/15  1439   MOLINA 9.0 8.8 9.0   < >  --    VITDT  --   --   --   --  46    < > = values in this interval not displayed.     ESR/CRP  Recent Labs   Lab Test 02/17/20  0818 08/08/19  0727 02/04/19  1604   SED 4 4 5   CRP <2.9 <2.9 <2.9     Hepatitis C  Recent Labs   Lab Test 08/07/12  1439   HCVAB Negative     Tuberculosis Screening  Recent Labs   Lab Test 08/07/12  1440   TBRSLT Negative   TBAGN 0.11     HIV Screening  Recent Labs   Lab Test 10/16/18  0757   HIAGAB Nonreactive       Immunization History     Immunization History   Administered Date(s) Administered     Influenza (IIV3) PF 10/29/2007, 10/13/2012, 10/15/2013     Influenza Vaccine IM > 6 months Valent IIV4 10/30/2014, 10/29/2015, 10/15/2016, 10/13/2017, 10/15/2018, 10/15/2019     Pneumococcal 23 valent 10/31/2002     TD (ADULT, 7+) 09/03/2004     TDAP Vaccine (Boostrix) 11/13/2014          Chart documentation done in part with Dragon Voice recognition Software. Although reviewed after completion, some word and grammatical error may remain.    Phone call start time: 3:25 PM  Phone call end time: 3:50 PM    More than 50% of the time was spent counseling the patient.  Total time was at least 25 minutes.     This visit is equivalent to a 82684 visit    Location of patient: home, in [place]  Location of provider: home    Follow up:  follow up appointment scheduled to be in Feb 2021    Eduard  MD Yair

## 2020-08-19 DIAGNOSIS — M05.79 SEROPOSITIVE RHEUMATOID ARTHRITIS OF MULTIPLE SITES (H): ICD-10-CM

## 2020-08-19 DIAGNOSIS — Z79.899 HIGH RISK MEDICATION USE: ICD-10-CM

## 2020-08-19 LAB
BASOPHILS # BLD AUTO: 0 10E9/L (ref 0–0.2)
BASOPHILS NFR BLD AUTO: 0.4 %
DIFFERENTIAL METHOD BLD: ABNORMAL
EOSINOPHIL # BLD AUTO: 0.1 10E9/L (ref 0–0.7)
EOSINOPHIL NFR BLD AUTO: 0.9 %
ERYTHROCYTE [DISTWIDTH] IN BLOOD BY AUTOMATED COUNT: 11.6 % (ref 10–15)
ERYTHROCYTE [SEDIMENTATION RATE] IN BLOOD BY WESTERGREN METHOD: 5 MM/H (ref 0–20)
HCT VFR BLD AUTO: 40.8 % (ref 40–53)
HGB BLD-MCNC: 14.4 G/DL (ref 13.3–17.7)
LYMPHOCYTES # BLD AUTO: 1.7 10E9/L (ref 0.8–5.3)
LYMPHOCYTES NFR BLD AUTO: 32.3 %
MCH RBC QN AUTO: 33 PG (ref 26.5–33)
MCHC RBC AUTO-ENTMCNC: 35.3 G/DL (ref 31.5–36.5)
MCV RBC AUTO: 93 FL (ref 78–100)
MONOCYTES # BLD AUTO: 0.5 10E9/L (ref 0–1.3)
MONOCYTES NFR BLD AUTO: 8.8 %
NEUTROPHILS # BLD AUTO: 3.1 10E9/L (ref 1.6–8.3)
NEUTROPHILS NFR BLD AUTO: 57.6 %
PLATELET # BLD AUTO: 205 10E9/L (ref 150–450)
RBC # BLD AUTO: 4.37 10E12/L (ref 4.4–5.9)
WBC # BLD AUTO: 5.3 10E9/L (ref 4–11)

## 2020-08-19 PROCEDURE — 86140 C-REACTIVE PROTEIN: CPT | Performed by: INTERNAL MEDICINE

## 2020-08-19 PROCEDURE — 36415 COLL VENOUS BLD VENIPUNCTURE: CPT | Performed by: INTERNAL MEDICINE

## 2020-08-19 PROCEDURE — 82565 ASSAY OF CREATININE: CPT | Performed by: INTERNAL MEDICINE

## 2020-08-19 PROCEDURE — 80076 HEPATIC FUNCTION PANEL: CPT | Performed by: INTERNAL MEDICINE

## 2020-08-19 PROCEDURE — 85652 RBC SED RATE AUTOMATED: CPT | Performed by: INTERNAL MEDICINE

## 2020-08-19 PROCEDURE — 85025 COMPLETE CBC W/AUTO DIFF WBC: CPT | Performed by: INTERNAL MEDICINE

## 2020-08-20 LAB
ALBUMIN SERPL-MCNC: 4.3 G/DL (ref 3.4–5)
ALP SERPL-CCNC: 80 U/L (ref 40–150)
ALT SERPL W P-5'-P-CCNC: 35 U/L (ref 0–70)
AST SERPL W P-5'-P-CCNC: 17 U/L (ref 0–45)
BILIRUB DIRECT SERPL-MCNC: <0.1 MG/DL (ref 0–0.2)
BILIRUB SERPL-MCNC: 0.5 MG/DL (ref 0.2–1.3)
CREAT SERPL-MCNC: 0.88 MG/DL (ref 0.66–1.25)
CRP SERPL-MCNC: <2.9 MG/L (ref 0–8)
GFR SERPL CREATININE-BSD FRML MDRD: >90 ML/MIN/{1.73_M2}
PROT SERPL-MCNC: 6.9 G/DL (ref 6.8–8.8)

## 2020-11-11 ENCOUNTER — TELEPHONE (OUTPATIENT)
Dept: FAMILY MEDICINE | Facility: CLINIC | Age: 60
End: 2020-11-11

## 2020-11-11 DIAGNOSIS — R73.01 IMPAIRED FASTING GLUCOSE: ICD-10-CM

## 2020-11-11 DIAGNOSIS — E78.5 HYPERLIPIDEMIA WITH TARGET LDL LESS THAN 130: Primary | ICD-10-CM

## 2020-11-11 DIAGNOSIS — I10 HYPERTENSION GOAL BP (BLOOD PRESSURE) < 140/90: ICD-10-CM

## 2020-11-11 DIAGNOSIS — Z12.5 SCREENING FOR PROSTATE CANCER: ICD-10-CM

## 2020-11-11 NOTE — TELEPHONE ENCOUNTER
Called patient and left a detailed message letting him know the orders were placed. Advised to call back to the clinic if he has questions.     Manasa Atkins RN

## 2020-11-11 NOTE — TELEPHONE ENCOUNTER
Reason for call:  Order   Order or referral being requested: yearly blood draw  Reason for request: Needs order  Date needed: at your convenience  Has the patient been seen by the PCP for this problem? YES    Additional comments: Patient would like his yearly testing ordered by Dr. Jones ordered so he can do all his blood draws at [facility] at the same time as his Rheumatology blood work    Phone number to reach patient:  Home number on file [phone] (home)    Best Time:  anytime    Can we leave a detailed message on this number?  YES    Travel screening: Not Applicable

## 2020-11-11 NOTE — TELEPHONE ENCOUNTER
Routed to provider. Please see message below. Patient requesting yearly lab orders.    Thank you.   Manasa Atkins RN

## 2020-11-30 DIAGNOSIS — Z12.5 SCREENING FOR PROSTATE CANCER: ICD-10-CM

## 2020-11-30 DIAGNOSIS — M05.79 SEROPOSITIVE RHEUMATOID ARTHRITIS OF MULTIPLE SITES (H): ICD-10-CM

## 2020-11-30 DIAGNOSIS — E78.5 HYPERLIPIDEMIA WITH TARGET LDL LESS THAN 130: ICD-10-CM

## 2020-11-30 DIAGNOSIS — R73.01 IMPAIRED FASTING GLUCOSE: ICD-10-CM

## 2020-11-30 DIAGNOSIS — Z79.899 HIGH RISK MEDICATION USE: ICD-10-CM

## 2020-11-30 LAB
ALBUMIN SERPL-MCNC: 4.2 G/DL (ref 3.4–5)
ALP SERPL-CCNC: 74 U/L (ref 40–150)
ALT SERPL W P-5'-P-CCNC: 32 U/L (ref 0–70)
ANION GAP SERPL CALCULATED.3IONS-SCNC: 5 MMOL/L (ref 3–14)
AST SERPL W P-5'-P-CCNC: 17 U/L (ref 0–45)
BASOPHILS # BLD AUTO: 0 10E9/L (ref 0–0.2)
BASOPHILS NFR BLD AUTO: 0.5 %
BILIRUB DIRECT SERPL-MCNC: <0.1 MG/DL (ref 0–0.2)
BILIRUB SERPL-MCNC: 0.5 MG/DL (ref 0.2–1.3)
BUN SERPL-MCNC: 12 MG/DL (ref 7–30)
CALCIUM SERPL-MCNC: 8.8 MG/DL (ref 8.5–10.1)
CHLORIDE SERPL-SCNC: 104 MMOL/L (ref 94–109)
CHOLEST SERPL-MCNC: 312 MG/DL
CO2 SERPL-SCNC: 29 MMOL/L (ref 20–32)
CREAT SERPL-MCNC: 0.8 MG/DL (ref 0.66–1.25)
CREAT SERPL-MCNC: 0.83 MG/DL (ref 0.66–1.25)
CRP SERPL-MCNC: <2.9 MG/L (ref 0–8)
DIFFERENTIAL METHOD BLD: NORMAL
EOSINOPHIL # BLD AUTO: 0.1 10E9/L (ref 0–0.7)
EOSINOPHIL NFR BLD AUTO: 1.2 %
ERYTHROCYTE [DISTWIDTH] IN BLOOD BY AUTOMATED COUNT: 11.7 % (ref 10–15)
ERYTHROCYTE [SEDIMENTATION RATE] IN BLOOD BY WESTERGREN METHOD: 3 MM/H (ref 0–20)
GFR SERPL CREATININE-BSD FRML MDRD: >90 ML/MIN/{1.73_M2}
GFR SERPL CREATININE-BSD FRML MDRD: >90 ML/MIN/{1.73_M2}
GLUCOSE SERPL-MCNC: 108 MG/DL (ref 70–99)
HBA1C MFR BLD: 5.3 % (ref 0–5.6)
HCT VFR BLD AUTO: 42.7 % (ref 40–53)
HDLC SERPL-MCNC: 52 MG/DL
HGB BLD-MCNC: 15 G/DL (ref 13.3–17.7)
LDLC SERPL CALC-MCNC: 232 MG/DL
LYMPHOCYTES # BLD AUTO: 1.5 10E9/L (ref 0.8–5.3)
LYMPHOCYTES NFR BLD AUTO: 34.3 %
MCH RBC QN AUTO: 32.8 PG (ref 26.5–33)
MCHC RBC AUTO-ENTMCNC: 35.1 G/DL (ref 31.5–36.5)
MCV RBC AUTO: 93 FL (ref 78–100)
MONOCYTES # BLD AUTO: 0.4 10E9/L (ref 0–1.3)
MONOCYTES NFR BLD AUTO: 8.8 %
NEUTROPHILS # BLD AUTO: 2.4 10E9/L (ref 1.6–8.3)
NEUTROPHILS NFR BLD AUTO: 55.2 %
NONHDLC SERPL-MCNC: 260 MG/DL
PLATELET # BLD AUTO: 192 10E9/L (ref 150–450)
POTASSIUM SERPL-SCNC: 3.9 MMOL/L (ref 3.4–5.3)
PROT SERPL-MCNC: 7 G/DL (ref 6.8–8.8)
PSA SERPL-ACNC: 1.45 UG/L (ref 0–4)
RBC # BLD AUTO: 4.57 10E12/L (ref 4.4–5.9)
SODIUM SERPL-SCNC: 138 MMOL/L (ref 133–144)
TRIGL SERPL-MCNC: 141 MG/DL
WBC # BLD AUTO: 4.3 10E9/L (ref 4–11)

## 2020-11-30 PROCEDURE — 80048 BASIC METABOLIC PNL TOTAL CA: CPT | Performed by: FAMILY MEDICINE

## 2020-11-30 PROCEDURE — 86140 C-REACTIVE PROTEIN: CPT | Performed by: INTERNAL MEDICINE

## 2020-11-30 PROCEDURE — 82565 ASSAY OF CREATININE: CPT | Performed by: INTERNAL MEDICINE

## 2020-11-30 PROCEDURE — 85025 COMPLETE CBC W/AUTO DIFF WBC: CPT | Performed by: INTERNAL MEDICINE

## 2020-11-30 PROCEDURE — 83036 HEMOGLOBIN GLYCOSYLATED A1C: CPT | Performed by: FAMILY MEDICINE

## 2020-11-30 PROCEDURE — 80061 LIPID PANEL: CPT | Performed by: FAMILY MEDICINE

## 2020-11-30 PROCEDURE — 80076 HEPATIC FUNCTION PANEL: CPT | Performed by: INTERNAL MEDICINE

## 2020-11-30 PROCEDURE — G0103 PSA SCREENING: HCPCS | Performed by: FAMILY MEDICINE

## 2020-11-30 PROCEDURE — 85652 RBC SED RATE AUTOMATED: CPT | Performed by: INTERNAL MEDICINE

## 2020-12-07 ENCOUNTER — OFFICE VISIT (OUTPATIENT)
Dept: FAMILY MEDICINE | Facility: CLINIC | Age: 60
End: 2020-12-07
Payer: COMMERCIAL

## 2020-12-07 VITALS
HEIGHT: 71 IN | SYSTOLIC BLOOD PRESSURE: 179 MMHG | OXYGEN SATURATION: 97 % | BODY MASS INDEX: 24.36 KG/M2 | HEART RATE: 69 BPM | TEMPERATURE: 97.7 F | DIASTOLIC BLOOD PRESSURE: 82 MMHG | WEIGHT: 174 LBS

## 2020-12-07 DIAGNOSIS — M05.79 SEROPOSITIVE RHEUMATOID ARTHRITIS OF MULTIPLE SITES (H): ICD-10-CM

## 2020-12-07 DIAGNOSIS — I10 HYPERTENSION GOAL BP (BLOOD PRESSURE) < 140/90: ICD-10-CM

## 2020-12-07 DIAGNOSIS — M25.521 PAIN OF BOTH ELBOWS: ICD-10-CM

## 2020-12-07 DIAGNOSIS — Z00.00 ROUTINE GENERAL MEDICAL EXAMINATION AT A HEALTH CARE FACILITY: Primary | ICD-10-CM

## 2020-12-07 DIAGNOSIS — M25.522 PAIN OF BOTH ELBOWS: ICD-10-CM

## 2020-12-07 DIAGNOSIS — E78.5 HYPERLIPIDEMIA WITH TARGET LDL LESS THAN 130: ICD-10-CM

## 2020-12-07 DIAGNOSIS — Z12.11 COLON CANCER SCREENING: ICD-10-CM

## 2020-12-07 DIAGNOSIS — R73.01 IMPAIRED FASTING GLUCOSE: ICD-10-CM

## 2020-12-07 PROCEDURE — 99214 OFFICE O/P EST MOD 30 MIN: CPT | Mod: 25 | Performed by: FAMILY MEDICINE

## 2020-12-07 PROCEDURE — 99396 PREV VISIT EST AGE 40-64: CPT | Performed by: FAMILY MEDICINE

## 2020-12-07 RX ORDER — AMLODIPINE AND BENAZEPRIL HYDROCHLORIDE 5; 20 MG/1; MG/1
1 CAPSULE ORAL DAILY
Qty: 90 CAPSULE | Refills: 3 | Status: SHIPPED | OUTPATIENT
Start: 2020-12-07 | End: 2020-12-30

## 2020-12-07 ASSESSMENT — ENCOUNTER SYMPTOMS
FEVER: 0
FREQUENCY: 0
HEADACHES: 0
HEMATURIA: 0
NERVOUS/ANXIOUS: 0
SHORTNESS OF BREATH: 0
CONSTIPATION: 0
MYALGIAS: 0
HEARTBURN: 0
DIZZINESS: 0
JOINT SWELLING: 0
PARESTHESIAS: 0
DIARRHEA: 0
HEMATOCHEZIA: 0
ABDOMINAL PAIN: 0
COUGH: 0
DYSURIA: 0
NAUSEA: 0
SORE THROAT: 0
WEAKNESS: 0
EYE PAIN: 0
ARTHRALGIAS: 1
CHILLS: 0
PALPITATIONS: 0

## 2020-12-07 ASSESSMENT — MIFFLIN-ST. JEOR: SCORE: 1621.39

## 2020-12-07 NOTE — PROGRESS NOTES
SUBJECTIVE:   CC: Bora Monroy is an 60 year old male who presents for a preventative health visit and follow-up on baseline health conditions.       Patient has been advised of split billing requirements and indicates understanding: Yes  Healthy Habits:     Getting at least 3 servings of Calcium per day:  Yes    Bi-annual eye exam:  NO    Dental care twice a year:  Yes    Sleep apnea or symptoms of sleep apnea:  None    Diet:  Low salt    Frequency of exercise:  4-5 days/week    Duration of exercise:  15-30 minutes    Taking medications regularly:  Yes    Medication side effects:  None    PHQ-2 Total Score: 0    Additional concerns today:  No        Joint or Musculoskeletal Pain  Nature of complaint: Elbow pain -- previous injections and Physical Therapy.    He has been having some pain in his elbows again, right greater than left.  He has dealt with this in the past and previously responded to injections and physical therapy.  He has not been doing any physical therapy or home exercises for the elbows lately.    He does have underlying rheumatoid arthritis.  He sees rheumatology for that.  He is on amlodipine 5 mg daily for hypertension.  He came in recently for fasting lab work.      Today's PHQ-2 Score:   PHQ-2 ( 1999 Pfizer) 12/7/2020   Q1: Little interest or pleasure in doing things 0   Q2: Feeling down, depressed or hopeless 0   PHQ-2 Score 0   Q1: Little interest or pleasure in doing things Not at all   Q2: Feeling down, depressed or hopeless Not at all   PHQ-2 Score 0       Abuse: Current or Past(Physical, Sexual or Emotional)- No  Do you feel safe in your environment? Yes        Social History     Tobacco Use     Smoking status: Never Smoker     Smokeless tobacco: Never Used   Substance Use Topics     Alcohol use: Yes     Comment: rare     If you drink alcohol do you typically have >3 drinks per day or >7 drinks per week? No    Alcohol Use 12/7/2020   Prescreen: >3 drinks/day or >7 drinks/week? No    Prescreen: >3 drinks/day or >7 drinks/week? -   No flowsheet data found.    Elba Montano MA    Last PSA:   PSA   Date Value Ref Range Status   11/30/2020 1.45 0 - 4 ug/L Final     Comment:     Assay Method:  Chemiluminescence using Siemens Vista analyzer       Reviewed orders with patient. Reviewed health maintenance and updated orders accordingly - Yes  Patient Active Problem List   Diagnosis     Presbyopias     Hyperopia     Hyperlipidemia with target LDL less than 130     Impaired fasting glucose     High risk medications (not anticoagulants) long-term use     Advanced directives, counseling/discussion     Seropositive rheumatoid arthritis of multiple sites (HCC)     Hypertension goal BP (blood pressure) < 140/90     Kidney stone     Past Surgical History:   Procedure Laterality Date     COLONOSCOPY  11/5/2010    COLONOSCOPY performed by FERMIN MCCLELLAND at [facility]     TONSILLECTOMY  1964    AGE 4       Social History     Tobacco Use     Smoking status: Never Smoker     Smokeless tobacco: Never Used   Substance Use Topics     Alcohol use: Yes     Comment: rare     Family History   Problem Relation Age of Onset     Lipids Sister      Diabetes Sister      Lipids Father      Hypertension Father      Cerebrovascular Disease Maternal Aunt      Cancer No family hx of          Current Outpatient Medications   Medication Sig Dispense Refill     amLODIPine (NORVASC) 5 MG tablet Take 1 tablet (5 mg) by mouth daily 90 tablet 3     Cholecalciferol (VITAMIN D PO) Take 1,000 Units by mouth       cinnamon 500 MG TABS Take 1 tablet by mouth daily        FISH OIL 1000 MG OR CAPS 1 tablet daily       glucosamine-chondroitin 500-400 MG CAPS per capsule Take 1 capsule by mouth daily       KRILL OIL PO Take 1 tablet by mouth daily       leflunomide (ARAVA) 20 MG tablet Take 1 tablet (20 mg) by mouth every other day 45 tablet 1     Misc Natural Products (TURMERIC CURCUMIN) CAPS Take by mouth daily       MULTI-VITAMIN OR TABS  "1 TABLET DAILY       psyllium (METAMUCIL) 30.9 % POWD Take 1 tsp by mouth 2 times daily.       VITAMIN C OR None Entered       VITAMIN E PO        triamcinolone (KENALOG) 0.1 % external cream Apply sparingly to affected area two to three times daily as needed for hand rash (Patient not taking: Reported on 5/8/2020) 60 g 2     Allergies   Allergen Reactions     Azithromycin Diarrhea       Reviewed and updated as needed this visit by clinical staff                 Reviewed and updated as needed this visit by Provider                    Review of Systems   Constitutional: Negative for chills and fever.   HENT: Negative for congestion, ear pain, hearing loss and sore throat.    Eyes: Negative for pain and visual disturbance.   Respiratory: Negative for cough and shortness of breath.    Cardiovascular: Negative for chest pain, palpitations and peripheral edema.   Gastrointestinal: Negative for abdominal pain, constipation, diarrhea, heartburn, hematochezia and nausea.   Genitourinary: Negative for discharge, dysuria, frequency, genital sores, hematuria, impotence and urgency.   Musculoskeletal: Positive for arthralgias. Negative for joint swelling and myalgias.   Skin: Negative for rash.   Neurological: Negative for dizziness, weakness, headaches and paresthesias.   Psychiatric/Behavioral: Negative for mood changes. The patient is not nervous/anxious.          OBJECTIVE:   BP (!) 179/82 (BP Location: Left arm, Patient Position: Chair, Cuff Size: Adult Regular)   Pulse 69   Temp 97.7  F (36.5  C) (Oral)   Ht 1.803 m (5' 11\")   Wt 78.9 kg (174 lb)   SpO2 97%   BMI 24.27 kg/m      Physical Exam  GENERAL: healthy, alert and no distress  EYES: Eyes grossly normal to inspection, PERRL and conjunctivae and sclerae normal  HENT: Grossly normal  NECK: no adenopathy, no asymmetry, masses, or scars and thyroid normal to palpation  RESP: lungs clear to auscultation - no rales, rhonchi or wheezes  CV: regular rate and rhythm, " normal S1 S2, no S3 or S4, no murmur, click or rub, no peripheral edema and peripheral pulses strong  ABDOMEN: soft, nontender, no hepatosplenomegaly, no masses   MS: no gross musculoskeletal defects noted, no edema.  He has some mild discomfort to palpation in the region of the lateral epicondyle and ulnar groove of the right elbow more so the left elbow.  SKIN: no suspicious lesions or rashes  NEURO: Normal strength and tone, mentation intact and speech normal  PSYCH: mentation appears normal, affect normal/bright    Diagnostic Test Results:  Labs reviewed in Epic  Orders Only on 11/30/2020   Component Date Value Ref Range Status     Cholesterol 11/30/2020 312* <200 mg/dL Final    Desirable:       <200 mg/dl     Triglycerides 11/30/2020 141  <150 mg/dL Final    Fasting specimen     HDL Cholesterol 11/30/2020 52  >39 mg/dL Final     LDL Cholesterol Calculated 11/30/2020 232* <100 mg/dL Final    Comment: Above desirable:  100-129 mg/dl  Borderline High:  130-159 mg/dL  High:             160-189 mg/dL  Very high:       >189 mg/dl       Non HDL Cholesterol 11/30/2020 260* <130 mg/dL Final    Comment: Above Desirable:  130-159 mg/dl  Borderline high:  160-189 mg/dl  High:             190-219 mg/dl  Very high:       >219 mg/dl       Sodium 11/30/2020 138  133 - 144 mmol/L Final     Potassium 11/30/2020 3.9  3.4 - 5.3 mmol/L Final     Chloride 11/30/2020 104  94 - 109 mmol/L Final     Carbon Dioxide 11/30/2020 29  20 - 32 mmol/L Final     Anion Gap 11/30/2020 5  3 - 14 mmol/L Final     Glucose 11/30/2020 108* 70 - 99 mg/dL Final    Fasting specimen     Urea Nitrogen 11/30/2020 12  7 - 30 mg/dL Final     Creatinine 11/30/2020 0.80  0.66 - 1.25 mg/dL Final     GFR Estimate 11/30/2020 >90  >60 mL/min/[1.73_m2] Final    Comment: Non  GFR Calc  Starting 12/18/2018, serum creatinine based estimated GFR (eGFR) will be   calculated using the Chronic Kidney Disease Epidemiology Collaboration   (CKD-EPI)  equation.       GFR Estimate If Black 11/30/2020 >90  >60 mL/min/[1.73_m2] Final    Comment:  GFR Calc  Starting 12/18/2018, serum creatinine based estimated GFR (eGFR) will be   calculated using the Chronic Kidney Disease Epidemiology Collaboration   (CKD-EPI) equation.       Calcium 11/30/2020 8.8  8.5 - 10.1 mg/dL Final     PSA 11/30/2020 1.45  0 - 4 ug/L Final    Assay Method:  Chemiluminescence using Siemens Vista analyzer     Hemoglobin A1C 11/30/2020 5.3  0 - 5.6 % Final    Comment: Normal <5.7% Prediabetes 5.7-6.4%  Diabetes 6.5% or higher - adopted from ADA   consensus guidelines.       Bilirubin Direct 11/30/2020 <0.1  0.0 - 0.2 mg/dL Final     Bilirubin Total 11/30/2020 0.5  0.2 - 1.3 mg/dL Final     Albumin 11/30/2020 4.2  3.4 - 5.0 g/dL Final     Protein Total 11/30/2020 7.0  6.8 - 8.8 g/dL Final     Alkaline Phosphatase 11/30/2020 74  40 - 150 U/L Final     ALT 11/30/2020 32  0 - 70 U/L Final     AST 11/30/2020 17  0 - 45 U/L Final     CRP Inflammation 11/30/2020 <2.9  0.0 - 8.0 mg/L Final     Sed Rate 11/30/2020 3  0 - 20 mm/h Final     Creatinine 11/30/2020 0.83  0.66 - 1.25 mg/dL Final     GFR Estimate 11/30/2020 >90  >60 mL/min/[1.73_m2] Final    Comment: Non  GFR Calc  Starting 12/18/2018, serum creatinine based estimated GFR (eGFR) will be   calculated using the Chronic Kidney Disease Epidemiology Collaboration   (CKD-EPI) equation.       GFR Estimate If Black 11/30/2020 >90  >60 mL/min/[1.73_m2] Final    Comment:  GFR Calc  Starting 12/18/2018, serum creatinine based estimated GFR (eGFR) will be   calculated using the Chronic Kidney Disease Epidemiology Collaboration   (CKD-EPI) equation.       WBC 11/30/2020 4.3  4.0 - 11.0 10e9/L Final     RBC Count 11/30/2020 4.57  4.4 - 5.9 10e12/L Final     Hemoglobin 11/30/2020 15.0  13.3 - 17.7 g/dL Final     Hematocrit 11/30/2020 42.7  40.0 - 53.0 % Final     MCV 11/30/2020 93  78 - 100 fl Final     MCH  11/30/2020 32.8  26.5 - 33.0 pg Final     MCHC 11/30/2020 35.1  31.5 - 36.5 g/dL Final     RDW 11/30/2020 11.7  10.0 - 15.0 % Final     Platelet Count 11/30/2020 192  150 - 450 10e9/L Final     % Neutrophils 11/30/2020 55.2  % Final     % Lymphocytes 11/30/2020 34.3  % Final     % Monocytes 11/30/2020 8.8  % Final     % Eosinophils 11/30/2020 1.2  % Final     % Basophils 11/30/2020 0.5  % Final     Absolute Neutrophil 11/30/2020 2.4  1.6 - 8.3 10e9/L Final     Absolute Lymphocytes 11/30/2020 1.5  0.8 - 5.3 10e9/L Final     Absolute Monocytes 11/30/2020 0.4  0.0 - 1.3 10e9/L Final     Absolute Eosinophils 11/30/2020 0.1  0.0 - 0.7 10e9/L Final     Absolute Basophils 11/30/2020 0.0  0.0 - 0.2 10e9/L Final     Diff Method 11/30/2020 Automated Method   Final         ASSESSMENT/PLAN:       ICD-10-CM    1. Routine general medical examination at a health care facility  Z00.00    2. Hypertension goal BP (blood pressure) < 140/90  I10 amLODIPine-benazepril (LOTREL) 5-20 MG capsule     Basic metabolic panel   3. Impaired fasting glucose  R73.01    4. Seropositive rheumatoid arthritis of multiple sites (HCC)  M05.79    5. Hyperlipidemia with target LDL less than 130  E78.5 Lipid panel reflex to direct LDL Fasting   6. Pain of both elbows  M25.521     M25.522    7. Colon cancer screening  Z12.11 GASTROENTEROLOGY ADULT REF PROCEDURE ONLY     His blood pressure is significantly elevated today and his lipid values are significantly elevated as well  He has been resistant to going on a statin, but he is becoming more open to that now given his current values  He had previously taken simvastatin and actually did fine with that  He wants to give it about 6 months of more aggressive diet and exercise treatment and if they are still significantly elevated, then he is willing to go back on a statin  We will proceed with that plan for his hyperlipidemia  For his blood pressure, we will add benazepril to the amlodipine  He will check  "routine blood pressure readings at work  He will continue ongoing rheumatology care for his rheumatoid arthritis  I recommended a home exercise program for his elbow pain  He is due for another colonoscopy, so referral was made for that    We will plan a recheck in 6 months with repeat fasting lab work followed by office visit    Patient has been advised of split billing requirements and indicates understanding: Yes  COUNSELING:   Reviewed preventive health counseling, as reflected in patient instructions       Regular exercise       Healthy diet/nutrition    Estimated body mass index is 24.27 kg/m  as calculated from the following:    Height as of this encounter: 1.803 m (5' 11\").    Weight as of this encounter: 78.9 kg (174 lb).         He reports that he has never smoked. He has never used smokeless tobacco.      Counseling Resources:  ATP IV Guidelines  Pooled Cohorts Equation Calculator  FRAX Risk Assessment  ICSI Preventive Guidelines  Dietary Guidelines for Americans, 2010  USDA's MyPlate  ASA Prophylaxis  Lung CA Screening    Yakov Jones MD  [facility]  "

## 2020-12-07 NOTE — PATIENT INSTRUCTIONS
Preventive Health Recommendations  Male Ages 50 - 64    Yearly exam:             See your health care provider every year in order to  o   Review health changes.   o   Discuss preventive care.    o   Review your medicines if your doctor has prescribed any.     Have a cholesterol test every 5 years, or more frequently if you are at risk for high cholesterol/heart disease.     Have a diabetes test (fasting glucose) every three years. If you are at risk for diabetes, you should have this test more often.     Have a colonoscopy at age 50, or have a yearly FIT test (stool test). These exams will check for colon cancer.      Talk with your health care provider about whether or not a prostate cancer screening test (PSA) is right for you.    You should be tested each year for STDs (sexually transmitted diseases), if you re at risk.     Shots: Get a flu shot each year. Get a tetanus shot every 10 years.     Nutrition:    Eat at least 5 servings of fruits and vegetables daily.     Eat whole-grain bread, whole-wheat pasta and brown rice instead of white grains and rice.     Get adequate Calcium and Vitamin D.     Lifestyle    Exercise for at least 150 minutes a week (30 minutes a day, 5 days a week). This will help you control your weight and prevent disease.     Limit alcohol to one drink per day.     No smoking.     Wear sunscreen to prevent skin cancer.     See your dentist every six months for an exam and cleaning.     See your eye doctor every 1 to 2 years.    Preventive Health Recommendations  Male Ages 50 - 64    Yearly exam:             See your health care provider every year in order to  o   Review health changes.   o   Discuss preventive care.    o   Review your medicines if your doctor has prescribed any.     Have a cholesterol test every 5 years, or more frequently if you are at risk for high cholesterol/heart disease.     Have a diabetes test (fasting glucose) every three years. If you are at risk for diabetes,  you should have this test more often.     Have a colonoscopy at age 50, or have a yearly FIT test (stool test). These exams will check for colon cancer.      Talk with your health care provider about whether or not a prostate cancer screening test (PSA) is right for you.    You should be tested each year for STDs (sexually transmitted diseases), if you re at risk.     Shots: Get a flu shot each year. Get a tetanus shot every 10 years.     Nutrition:    Eat at least 5 servings of fruits and vegetables daily.     Eat whole-grain bread, whole-wheat pasta and brown rice instead of white grains and rice.     Get adequate Calcium and Vitamin D.     Lifestyle    Exercise for at least 150 minutes a week (30 minutes a day, 5 days a week). This will help you control your weight and prevent disease.     Limit alcohol to one drink per day.     No smoking.     Wear sunscreen to prevent skin cancer.     See your dentist every six months for an exam and cleaning.     See your eye doctor every 1 to 2 years.    Preventive Health Recommendations  Male Ages 50 - 64    Yearly exam:             See your health care provider every year in order to  o   Review health changes.   o   Discuss preventive care.    o   Review your medicines if your doctor has prescribed any.     Have a cholesterol test every 5 years, or more frequently if you are at risk for high cholesterol/heart disease.     Have a diabetes test (fasting glucose) every three years. If you are at risk for diabetes, you should have this test more often.     Have a colonoscopy at age 50, or have a yearly FIT test (stool test). These exams will check for colon cancer.      Talk with your health care provider about whether or not a prostate cancer screening test (PSA) is right for you.    You should be tested each year for STDs (sexually transmitted diseases), if you re at risk.     Shots: Get a flu shot each year. Get a tetanus shot every 10 years.     Nutrition:    Eat at least  5 servings of fruits and vegetables daily.     Eat whole-grain bread, whole-wheat pasta and brown rice instead of white grains and rice.     Get adequate Calcium and Vitamin D.     Lifestyle    Exercise for at least 150 minutes a week (30 minutes a day, 5 days a week). This will help you control your weight and prevent disease.     Limit alcohol to one drink per day.     No smoking.     Wear sunscreen to prevent skin cancer.     See your dentist every six months for an exam and cleaning.     See your eye doctor every 1 to 2 years.

## 2020-12-07 NOTE — PROGRESS NOTES
"  3  SUBJECTIVE:   CC: Bora Monroy is an 60 year old male who presents for preventive health visit.     {Split Bill scripting  The purpose of this visit is to discuss your medical history and prevent health problems before you are sick. You may be responsible for a co-pay, coinsurance, or deductible if your visit today includes services such as checking on a sore throat, having an x-ray or lab test, or treating and evaluating a new or existing condition :473861}  Patient has been advised of split billing requirements and indicates understanding: {Yes and No:372420}  Healthy Habits:    Do you get at least three servings of calcium containing foods daily (dairy, green leafy vegetables, etc.)? { :701184::\"yes\"}    Amount of exercise or daily activities, outside of work: { :566649}    Problems taking medications regularly { :155018::\"No\"}    Medication side effects: { :851029::\"No\"}    Have you had an eye exam in the past two years? { :485825}    Do you see a dentist twice per year? { :008265}    Do you have sleep apnea, excessive snoring or daytime drowsiness?{ :938836}  {Outside tests to abstract? :413939}    {additional problems to add (Optional):064492}    Today's PHQ-2 Score:   PHQ-2 ( 1999 Pfizer) 12/7/2020 4/28/2020   Q1: Little interest or pleasure in doing things - 0   Q2: Feeling down, depressed or hopeless - 0   PHQ-2 Score - 0   Q1: Little interest or pleasure in doing things - -   Q2: Feeling down, depressed or hopeless - -   PHQ-2 Score Incomplete -     {PHQ-2 LOOK IN ASSESSMENTS (Optional) :418494}  Abuse: Current or Past(Physical, Sexual or Emotional)- {YES/NO/NA:072642}  Do you feel safe in your environment? {YES/NO/NA:786728}        Social History     Tobacco Use     Smoking status: Never Smoker     Smokeless tobacco: Never Used   Substance Use Topics     Alcohol use: Yes     Comment: rare     If you drink alcohol do you typically have >3 drinks per day or >7 drinks per week? {ETOH :812866}       " "               Last PSA:   PSA   Date Value Ref Range Status   11/30/2020 1.45 0 - 4 ug/L Final     Comment:     Assay Method:  Chemiluminescence using Siemens Vista analyzer       Reviewed orders with patient. Reviewed health maintenance and updated orders accordingly - {Yes/No:269007::\"Yes\"}  {Chronicprobdata (Optional):058388}    Reviewed and updated as needed this visit by clinical staff                 Reviewed and updated as needed this visit by Provider                {HISTORY OPTIONS (Optional):424662}    ROS:  { :214370::\"CONSTITUTIONAL: NEGATIVE for fever, chills, change in weight\",\"INTEGUMENTARY/SKIN: NEGATIVE for worrisome rashes, moles or lesions\",\"EYES: NEGATIVE for vision changes or irritation\",\"ENT: NEGATIVE for ear, mouth and throat problems\",\"RESP: NEGATIVE for significant cough or SOB\",\"CV: NEGATIVE for chest pain, palpitations or peripheral edema\",\"GI: NEGATIVE for nausea, abdominal pain, heartburn, or change in bowel habits\",\" male: negative for dysuria, hematuria, decreased urinary stream, erectile dysfunction, urethral discharge\",\"MUSCULOSKELETAL: NEGATIVE for significant arthralgias or myalgia\",\"NEURO: NEGATIVE for weakness, dizziness or paresthesias\",\"PSYCHIATRIC: NEGATIVE for changes in mood or affect\"}    OBJECTIVE:   There were no vitals taken for this visit.  EXAM:  {Exam Choices:576625}    {Diagnostic Test Results (Optional):705357::\"Diagnostic Test Results:\",\"Labs reviewed in Epic\"}    ASSESSMENT/PLAN:   {Diag Picklist:729228}    Patient has been advised of split billing requirements and indicates understanding: {YES / NO:769939::\"Yes\"}  COUNSELING:  {MALE COUNSELING MESSAGES:456414::\"Reviewed preventive health counseling, as reflected in patient instructions\"}    Estimated body mass index is 23.06 kg/m  as calculated from the following:    Height as of 4/28/20: 1.829 m (6').    Weight as of 7/7/20: 77.1 kg (170 lb).    {Weight Management Plan (ACO) Complete if BMI is abnormal-  Ages " 18-64  BMI >24.9.  Age 65+ with BMI <23 or >30 (Optional):070972}    He reports that he has never smoked. He has never used smokeless tobacco.      Counseling Resources:  ATP IV Guidelines  Pooled Cohorts Equation Calculator  FRAX Risk Assessment  ICSI Preventive Guidelines  Dietary Guidelines for Americans, 2010  USDA's MyPlate  ASA Prophylaxis  Lung CA Screening    Yakov Jones MD  [facility]

## 2020-12-15 ENCOUNTER — OFFICE VISIT (OUTPATIENT)
Dept: FAMILY MEDICINE | Facility: CLINIC | Age: 60
End: 2020-12-15
Payer: COMMERCIAL

## 2020-12-15 VITALS
BODY MASS INDEX: 23.4 KG/M2 | TEMPERATURE: 97.4 F | OXYGEN SATURATION: 98 % | SYSTOLIC BLOOD PRESSURE: 144 MMHG | HEART RATE: 63 BPM | WEIGHT: 167.8 LBS | RESPIRATION RATE: 14 BRPM | DIASTOLIC BLOOD PRESSURE: 78 MMHG

## 2020-12-15 DIAGNOSIS — R07.81 RIB PAIN ON LEFT SIDE: Primary | ICD-10-CM

## 2020-12-15 PROCEDURE — 99213 OFFICE O/P EST LOW 20 MIN: CPT | Performed by: PHYSICIAN ASSISTANT

## 2020-12-15 ASSESSMENT — ENCOUNTER SYMPTOMS
SHORTNESS OF BREATH: 0
NERVOUS/ANXIOUS: 0
ABDOMINAL PAIN: 0
COUGH: 0
DYSURIA: 0
FEVER: 0
DIARRHEA: 0
CONSTIPATION: 1
NAUSEA: 0
VOMITING: 0
LIGHT-HEADEDNESS: 0

## 2020-12-15 NOTE — PATIENT INSTRUCTIONS
Your symptoms are likely inflammation of the tissue connected to your ribs. For treatment you may use Tylenol/Ibuprofen as recommended on the bottle. You may also use heat and ice on the area. Please avoid activities that cause discomfort in this area.    If you develop severe symptoms such as chest pain, shortness of breath, nausea/vomiting, or fevers, please seek urgent evaluation.      Patient Education     Chest Wall Pain: Costochondritis    The chest pain that you have had today is caused by costochondritis. This condition is caused by an inflammation of the cartilage joining your ribs to your breastbone. It's not caused by heart or lung problems. Your healthcare team has made sure that the chest pain you feel is not from a life threatening cause of chest pain such as heart attack, collapsed lung, blood clot in the lung, tear in the aorta, or esophageal rupture. The inflammation may have been brought on by a blow to the chest, lifting heavy objects, intense exercise, or an illness that made you cough and sneeze a lot. It often occurs during times of emotional stress. It can be painful, but it's not dangerous. It usually goes away in 1 to 2 weeks. But it may happen again. Rarely, a more serious condition may cause symptoms similar to costochondritis. That s why it s important to watch for the warning signs listed below.   Home care  Follow these guidelines when caring for yourself at home:    If you feel that emotional stress is a cause of your condition, try to figure out the sources of that stress. It may not be obvious. Learn ways to deal with the stress in your life. This can include regular exercise, muscle relaxation, meditation, or simply taking time out for yourself.    You may use acetaminophen, ibuprofen, or naproxen to control pain, unless another pain medicine was prescribed. If you have liver or kidney disease or ever had a stomach ulcer, talk with your healthcare provider before using these  medicines.    You can also help ease pain by using a hot, wet compress or heating pad. Use this with or without a medicated skin cream that helps relieves pain.    Do stretching exercise as advised by your provider. Typically rest is beneficial for the first few days. Avoid strenuous activity that worsens the pain.    Take any prescribed medicines as directed.  Follow-up care  Follow up with your healthcare provider, or as advised.   When to seek medical advice  Call your healthcare provider right away if any of these occur:    A change in the type of pain. Call if it feels different, becomes more serious, lasts longer, or spreads into your shoulder, arm, neck, jaw, or back.    Shortness of breath or pain gets worse when you breathe    Weakness, dizziness, or fainting    Cough with dark-colored sputum (phlegm) or blood    Abdominal pain    Dark red or black stools    Fever of 100.4 F (38 C) or higher, or as directed by your healthcare provider  Billy last reviewed this educational content on 6/1/2019 2000-2020 [employer]. [address] [place] [address], [place] [address]. All rights reserved. This information is not intended as a substitute for professional medical care. Always follow your healthcare professional's instructions.

## 2020-12-15 NOTE — PROGRESS NOTES
Subjective     Bora Monroy is a 60 year old male who presents to clinic today for the following health issues:    HPI       Patient notes left lower rib/upper abdominal pressure at anterior aspect that is intermittent. He also noted a feeling of tightness in the upper left abdomen. There was a tender area of the lower rib in this area that has resolved. Pain improved with pressing on the upper abdomen/lower ribs in this area. Patient denies pain of the area. He notes blood pressure is always high when at the doctors office. Outside of work, BP is normal. Patient notes he did play soccer with grandson and slipped on the grass causing him to fall one week prior to symptoms starting.     Patient had recently improved diet and lost 8 lbs. He maintains activity with walking and calisthenics. Patient does note some hard stools and has history of constipation that he treats successfully with psyllium.     Chest Pain  Onset/Duration: couple weeks  Description:   Location: left side (under rib)  Character: tight and stiff, intermittent   Radiation: None  Duration: waxing and waning   Intensity: mild  Progression of Symptoms: improving  Accompanying Signs & Symptoms:  Shortness of breath: no  Sweating: no  Nausea/vomiting: no  Lightheadedness: no  Palpitations: no  Fever/Chills: no  Cough: no           Heartburn: no  History:   Family history of heart disease: no  Tobacco use: no  Previous similar symptoms: no   Precipitating factors:   Worse with exertion: no  Worse with deep breaths: no           Related to eating: YES- uncomfortable if he is full           Better with burping: no  Alleviating factors: None  Therapies tried and outcome: None    Margy Hernandez, GABINO      Review of Systems   Constitutional: Negative for fever.   HENT: Negative for congestion.    Respiratory: Negative for cough and shortness of breath.    Cardiovascular: Negative for chest pain.   Gastrointestinal: Positive for constipation. Negative  for abdominal pain, diarrhea, nausea and vomiting.   Genitourinary: Negative for dysuria and urgency.   Skin: Negative for rash.   Neurological: Negative for light-headedness.   Psychiatric/Behavioral: The patient is not nervous/anxious.             Objective    BP (!) 144/78 (BP Location: Right arm, Patient Position: Sitting, Cuff Size: Adult Large)   Pulse 63   Temp 97.4  F (36.3  C) (Tympanic)   Resp 14   Wt 76.1 kg (167 lb 12.8 oz)   SpO2 98%   BMI 23.40 kg/m    Body mass index is 23.4 kg/m .  Physical Exam  Vitals signs and nursing note reviewed.   Constitutional:       General: He is not in acute distress.     Appearance: Normal appearance.   HENT:      Head: Normocephalic and atraumatic.      Mouth/Throat:      Mouth: Mucous membranes are moist.      Pharynx: Oropharynx is clear.   Eyes:      Extraocular Movements: Extraocular movements intact.      Pupils: Pupils are equal, round, and reactive to light.   Neck:      Musculoskeletal: Normal range of motion.   Cardiovascular:      Rate and Rhythm: Normal rate and regular rhythm.      Heart sounds: Normal heart sounds.   Pulmonary:      Effort: Pulmonary effort is normal.      Breath sounds: Normal breath sounds.      Comments: No tenderness palpation of chest wall.  No erythema, ecchymosis, swelling.  Abdominal:      General: Bowel sounds are normal. There is no distension.      Palpations: Abdomen is soft. There is no mass.      Tenderness: There is no abdominal tenderness. There is no guarding or rebound.   Musculoskeletal: Normal range of motion.   Skin:     General: Skin is warm and dry.   Neurological:      General: No focal deficit present.      Mental Status: He is alert.   Psychiatric:         Mood and Affect: Mood normal.         Behavior: Behavior normal.                Assessment & Plan     Rib pain on left side  Patient is a 60-year-old male who presents to clinic due to tenderness of left lower ribs at anterior aspect that is improving as  well as a feeling of pressure in the left upper quadrant.  Vital signs without fever or tachycardia to suggest infectious etiology.  Additionally, physical exam without rebound, guarding, mass, distention.  Low suspicion for cardiac etiology of symptoms given location at left upper quadrant.  Discussed that symptoms could be due to constipation given pressure in this area, but patient notes adequate management with psyllium.  Patient also notes recent activity including playing soccer and falling onto the side 1.5 weeks prior to symptoms starting.  Patient is also been completing calisthenics at home.  Symptoms are most consistent with costochondritis.  Discussed symptomatic treatment.  Discussed symptoms that warrant urgent/emergent follow-up.          See Patient Instructions    Return in about 2 weeks (around 12/29/2020), or if symptoms worsen or fail to improve.    Leonora De Leon PA-C  [facility]

## 2020-12-25 DIAGNOSIS — I10 HYPERTENSION GOAL BP (BLOOD PRESSURE) < 140/90: ICD-10-CM

## 2020-12-27 RX ORDER — AMLODIPINE BESYLATE 5 MG/1
TABLET ORAL
Qty: 90 TABLET | Refills: 0 | OUTPATIENT
Start: 2020-12-27

## 2020-12-28 DIAGNOSIS — I10 HYPERTENSION GOAL BP (BLOOD PRESSURE) < 140/90: ICD-10-CM

## 2020-12-29 NOTE — TELEPHONE ENCOUNTER
Reason for Call:  Other prescription    Detailed comments: patient states the amLODIPine-benazepril (LOTREL) 5-20 MG capsule prescribed on 12/7/2020 the pharmacy never received the prescription and per pt the patient is supposed to be taking two medications. Patient is requesting we call  Or re send prescription to pharmacy      Phone Number Patient can be reached at: Cell number on file:    Telephone Information:   Mobile [phone]       Best Time: any    Can we leave a detailed message on this number? YES    Call taken on 12/29/2020 at 3:41 PM by Brit Stafford

## 2020-12-30 RX ORDER — AMLODIPINE BESYLATE 5 MG/1
TABLET ORAL
Qty: 90 TABLET | Refills: 0 | OUTPATIENT
Start: 2020-12-30

## 2020-12-30 RX ORDER — AMLODIPINE AND BENAZEPRIL HYDROCHLORIDE 5; 20 MG/1; MG/1
1 CAPSULE ORAL DAILY
Qty: 90 CAPSULE | Refills: 3 | Status: SHIPPED | OUTPATIENT
Start: 2020-12-30 | End: 2021-12-08 | Stop reason: SINTOL

## 2020-12-31 NOTE — TELEPHONE ENCOUNTER
Patient informed. That prescription sent on 12/7/20 was new medication and contains two medication. Patient will check pill bottle when he gets home and call back with any questions or concerns. Kavita Landers MA

## 2021-01-03 DIAGNOSIS — Z11.59 ENCOUNTER FOR SCREENING FOR OTHER VIRAL DISEASES: Primary | ICD-10-CM

## 2021-01-06 ENCOUNTER — ANESTHESIA EVENT (OUTPATIENT)
Dept: GASTROENTEROLOGY | Facility: CLINIC | Age: 61
End: 2021-01-06
Payer: COMMERCIAL

## 2021-01-06 NOTE — ANESTHESIA PREPROCEDURE EVALUATION
Anesthesia Pre-Procedure Evaluation    Patient: Bora Monroy   MRN: [id number] : 1960          Preoperative Diagnosis: Colon cancer screening [Z12.11]    Procedure(s):  COLONOSCOPY    Past Medical History:   Diagnosis Date     Hyperlipidemia LDL goal < 130     declines statins -- favorable CAC in      Hypertension goal BP (blood pressure) < 140/90 2015    NL stress test 16, med started      Impaired fasting glucose      Kidney stone 2016     Seropositive rheumatoid arthritis of multiple sites (H) 2015     Past Surgical History:   Procedure Laterality Date     COLONOSCOPY  2010    COLONOSCOPY performed by FERMIN MCCLELLAND at [facility]     TONSILLECTOMY  1964    AGE 4       Anesthesia Evaluation     .             ROS/MED HX    ENT/Pulmonary:       Neurologic:       Cardiovascular:     (+) Dyslipidemia, hypertension----. : . . . :. .       METS/Exercise Tolerance:     Hematologic:         Musculoskeletal:   (+) arthritis (RA),  -       GI/Hepatic:         Renal/Genitourinary:     (+) Nephrolithiasis ,       Endo:     (+) Other Endocrine Disorder IFG.      Psychiatric:         Infectious Disease:         Malignancy:         Other:                          Physical Exam  Normal systems: cardiovascular, pulmonary and dental    Airway   Mallampati: I  TM distance: >3 FB  Neck ROM: full    Dental     Cardiovascular   Rhythm and rate: regular      Pulmonary    breath sounds clear to auscultation            Lab Results   Component Value Date    WBC 4.3 2020    HGB 15.0 2020    HCT 42.7 2020     2020    CRP <2.9 2020    SED 3 2020     2020    POTASSIUM 3.9 2020    CHLORIDE 104 2020    CO2 29 2020    BUN 12 2020    CR 0.80 2020    CR 0.83 2020     (H) 2020    MOLINA 8.8 2020    PHOS 2.6 2017    ALBUMIN 4.2 2020    PROTTOTAL 7.0 2020    ALT 32 2020     "AST 17 11/30/2020    ALKPHOS 74 11/30/2020    BILITOTAL 0.5 11/30/2020    TSH 1.37 06/13/2012       Preop Vitals  BP Readings from Last 3 Encounters:   12/15/20 (!) 144/78   12/07/20 (!) 179/82   07/07/20 (!) 142/82    Pulse Readings from Last 3 Encounters:   12/15/20 63   12/07/20 69   07/07/20 78      Resp Readings from Last 3 Encounters:   12/15/20 14   04/28/20 16   08/06/18 16    SpO2 Readings from Last 3 Encounters:   12/15/20 98%   12/07/20 97%   06/23/20 100%      Temp Readings from Last 1 Encounters:   12/15/20 36.3  C (97.4  F) (Tympanic)    Ht Readings from Last 1 Encounters:   12/07/20 1.803 m (5' 11\")      Wt Readings from Last 1 Encounters:   12/15/20 76.1 kg (167 lb 12.8 oz)    Estimated body mass index is 23.4 kg/m  as calculated from the following:    Height as of 12/7/20: 1.803 m (5' 11\").    Weight as of 12/15/20: 76.1 kg (167 lb 12.8 oz).       Anesthesia Plan      History & Physical Review  History and physical reviewed and following examination; no interval change.    ASA Status:  3.     Plan for MAC Reason for MAC:  Deep or markedly invasive procedure (G8).              Postoperative Care      Consents  Anesthetic plan, risks, benefits and alternatives discussed with:  Patient..                 JOYCE Butt CRNA  "

## 2021-01-21 ENCOUNTER — OFFICE VISIT (OUTPATIENT)
Dept: FAMILY MEDICINE | Facility: CLINIC | Age: 61
End: 2021-01-21
Payer: COMMERCIAL

## 2021-01-21 ENCOUNTER — ANCILLARY PROCEDURE (OUTPATIENT)
Dept: GENERAL RADIOLOGY | Facility: CLINIC | Age: 61
End: 2021-01-21
Attending: PHYSICIAN ASSISTANT
Payer: COMMERCIAL

## 2021-01-21 VITALS
HEART RATE: 62 BPM | HEIGHT: 71 IN | TEMPERATURE: 97.2 F | SYSTOLIC BLOOD PRESSURE: 134 MMHG | DIASTOLIC BLOOD PRESSURE: 72 MMHG | RESPIRATION RATE: 16 BRPM | BODY MASS INDEX: 23.26 KG/M2 | WEIGHT: 166.13 LBS

## 2021-01-21 DIAGNOSIS — R10.9 LEFT FLANK PAIN: Primary | ICD-10-CM

## 2021-01-21 DIAGNOSIS — R07.81 RIB PAIN ON LEFT SIDE: ICD-10-CM

## 2021-01-21 LAB
ALBUMIN SERPL-MCNC: 4.4 G/DL (ref 3.4–5)
ALBUMIN UR-MCNC: NEGATIVE MG/DL
ALP SERPL-CCNC: 85 U/L (ref 40–150)
ALT SERPL W P-5'-P-CCNC: 32 U/L (ref 0–70)
ANION GAP SERPL CALCULATED.3IONS-SCNC: 4 MMOL/L (ref 3–14)
APPEARANCE UR: CLEAR
AST SERPL W P-5'-P-CCNC: 23 U/L (ref 0–45)
BILIRUB DIRECT SERPL-MCNC: 0.1 MG/DL (ref 0–0.2)
BILIRUB SERPL-MCNC: 0.7 MG/DL (ref 0.2–1.3)
BILIRUB UR QL STRIP: NEGATIVE
BUN SERPL-MCNC: 12 MG/DL (ref 7–30)
CALCIUM SERPL-MCNC: 9.1 MG/DL (ref 8.5–10.1)
CHLORIDE SERPL-SCNC: 102 MMOL/L (ref 94–109)
CO2 SERPL-SCNC: 30 MMOL/L (ref 20–32)
COLOR UR AUTO: YELLOW
CREAT SERPL-MCNC: 0.86 MG/DL (ref 0.66–1.25)
D DIMER PPP FEU-MCNC: 0.3 UG/ML FEU (ref 0–0.5)
ERYTHROCYTE [DISTWIDTH] IN BLOOD BY AUTOMATED COUNT: 11.5 % (ref 10–15)
GFR SERPL CREATININE-BSD FRML MDRD: >90 ML/MIN/{1.73_M2}
GLUCOSE SERPL-MCNC: 98 MG/DL (ref 70–99)
GLUCOSE UR STRIP-MCNC: NEGATIVE MG/DL
HCT VFR BLD AUTO: 40.8 % (ref 40–53)
HGB BLD-MCNC: 14.3 G/DL (ref 13.3–17.7)
HGB UR QL STRIP: NEGATIVE
KETONES UR STRIP-MCNC: NEGATIVE MG/DL
LEUKOCYTE ESTERASE UR QL STRIP: NEGATIVE
LIPASE SERPL-CCNC: 160 U/L (ref 73–393)
MCH RBC QN AUTO: 33 PG (ref 26.5–33)
MCHC RBC AUTO-ENTMCNC: 35 G/DL (ref 31.5–36.5)
MCV RBC AUTO: 94 FL (ref 78–100)
NITRATE UR QL: NEGATIVE
PH UR STRIP: 7 PH (ref 5–7)
PLATELET # BLD AUTO: 194 10E9/L (ref 150–450)
POTASSIUM SERPL-SCNC: 4.4 MMOL/L (ref 3.4–5.3)
PROT SERPL-MCNC: 7.2 G/DL (ref 6.8–8.8)
RBC # BLD AUTO: 4.33 10E12/L (ref 4.4–5.9)
SODIUM SERPL-SCNC: 136 MMOL/L (ref 133–144)
SOURCE: NORMAL
SP GR UR STRIP: 1.01 (ref 1–1.03)
TROPONIN I SERPL-MCNC: <0.015 UG/L (ref 0–0.04)
UROBILINOGEN UR STRIP-ACNC: 0.2 EU/DL (ref 0.2–1)
WBC # BLD AUTO: 4.8 10E9/L (ref 4–11)

## 2021-01-21 PROCEDURE — 85379 FIBRIN DEGRADATION QUANT: CPT | Performed by: PHYSICIAN ASSISTANT

## 2021-01-21 PROCEDURE — 99214 OFFICE O/P EST MOD 30 MIN: CPT | Performed by: PHYSICIAN ASSISTANT

## 2021-01-21 PROCEDURE — 71101 X-RAY EXAM UNILAT RIBS/CHEST: CPT | Mod: LT | Performed by: RADIOLOGY

## 2021-01-21 PROCEDURE — 83690 ASSAY OF LIPASE: CPT | Performed by: PHYSICIAN ASSISTANT

## 2021-01-21 PROCEDURE — 36415 COLL VENOUS BLD VENIPUNCTURE: CPT | Performed by: PHYSICIAN ASSISTANT

## 2021-01-21 PROCEDURE — 84484 ASSAY OF TROPONIN QUANT: CPT | Performed by: PHYSICIAN ASSISTANT

## 2021-01-21 PROCEDURE — 93000 ELECTROCARDIOGRAM COMPLETE: CPT | Performed by: PHYSICIAN ASSISTANT

## 2021-01-21 PROCEDURE — 81003 URINALYSIS AUTO W/O SCOPE: CPT | Performed by: PHYSICIAN ASSISTANT

## 2021-01-21 PROCEDURE — 80076 HEPATIC FUNCTION PANEL: CPT | Performed by: PHYSICIAN ASSISTANT

## 2021-01-21 PROCEDURE — 85027 COMPLETE CBC AUTOMATED: CPT | Performed by: PHYSICIAN ASSISTANT

## 2021-01-21 PROCEDURE — 80048 BASIC METABOLIC PNL TOTAL CA: CPT | Performed by: PHYSICIAN ASSISTANT

## 2021-01-21 ASSESSMENT — ENCOUNTER SYMPTOMS
NAUSEA: 0
NERVOUS/ANXIOUS: 0
DYSURIA: 0
ABDOMINAL PAIN: 0
FEVER: 0
VOMITING: 0
LIGHT-HEADEDNESS: 0
SHORTNESS OF BREATH: 0
CHILLS: 0

## 2021-01-21 ASSESSMENT — MIFFLIN-ST. JEOR: SCORE: 1585.67

## 2021-01-21 NOTE — PATIENT INSTRUCTIONS
Adonis Fernandez,    We completed your rib x-ray today.  I do not see an obvious fracture, but the radiologist will also look at this and I will reach out to you with results.  Additionally, we are completing blood work today for further evaluation of your liver, gallbladder, pancreas, heart function, and kidney function.  I am also completing blood test for clotting.     As always, if you develop severe chest pain, shortness of breath, nausea/vomiting, or other severe symptoms, please go to the emergency department.    Please reach out with any questions or concerns.    Take Care,  Leonora De Leon PA-C      Patient Education     Abdominal Pain  Abdominal pain is pain in the stomach or belly area. Everyone has this pain from time to time. In many cases it goes away on its own. But abdominal pain can sometimes be due to a serious problem, such as appendicitis. So it s important to know when to get help.     Causes of abdominal pain   There are many possible causes of abdominal pain. Common causes in adults include:    Constipation, diarrhea, or gas    Stomach acid flowing back up into the esophagus (acid reflux or heartburn)    Severe acid reflux, called GERD (gastroesophageal reflux disease)    A sore in the lining of the stomach or small intestine (peptic ulcer)    Inflammation of the gallbladder, liver, or pancreas    Gallstones or kidney stones    Appendicitis     Intestinal blockage     An internal organ pushing through a muscle or other tissue (hernia)    Urinary tract infections    In women, menstrual cramps, fibroids, ovarian cysts, pelvic inflammatory disease, or endometriosis    Inflammation or infection of the intestines, including Crohn's disease and ulcerative colitis    Irritable bowel syndrome  Diagnosing the cause of abdominal pain   Your healthcare provider will give you a physical exam help find the cause of your pain. If needed, you will have tests. Belly pain has many possible causes. So it can be hard to find  the reason for your pain. Giving details about your pain can help. Tell your provider where and when you feel the pain, and what makes it better or worse. Also let your provider know if you have other symptoms such as:     Fever    Tiredness    Upset stomach (nausea)    Vomiting    Changes in bathroom habits    Blood in the stool or black, tarry stool    Weight loss that you can't explain (involuntary weight loss?)  Also report any family history of stomach or intestinal problems, or cancers. Tell your provider about all your alcohol use and drug use. Tell your provider about all medicines you use, including herbs, vitamins, and supplements.   Treating abdominal pain   Some causes of pain need emergency medical treatment right away. These include appendicitis or a bowel blockage. Other problems can be treated with rest, fluids, or medicines. Your healthcare provider can give you specific instructions for treatment or self-care based on what is causing your pain.      If you have vomiting or diarrhea, sip water or other clear fluids. When you are ready to eat solid foods again, start with small amounts of easy-to-digest, low-fat foods. These include apple sauce, toast, or crackers.   When to get medical care   Call 911 or go to the hospital right away if you:     Can t pass stool and are vomiting    Are vomiting blood or have bloody diarrhea or black, tarry diarrhea    Have chest, neck, or shoulder pain    Feel like you might pass out    Have pain in your shoulder blades with nausea    Have sudden, severe belly pain    Have new, severe pain unlike any you have felt before    Have a belly that is rigid, hard, and hurts to touch  Call your healthcare provider if you have:     Pain for more than 5 days    Bloating for more than 2 days    Diarrhea for more than 5 days    A fever of 100.4 F (38 C) or higher, or as directed by your healthcare provider    Pain that gets worse    Weight loss for no reason    Continued lack  of appetite    Blood in your stool  How to prevent abdominal pain   Here are some tips to help prevent abdominal pain:     Eat smaller amounts of food at each meal.    Don't eat greasy, fried, or other high-fat foods.    Don't eat foods that give you gas.    Exercise regularly.    Drink plenty of fluids.  To help prevent GERD symptoms:     Quit smoking.    Reduce alcohol and foods that increase stomach acid.    Don't use aspirin or over-the-counter pain and fever medicines, if possible. This includes nonsteroidal anti-inflammatory drugs (NSAIDs).    Lose excess weight.    Finish eating at least 2 hours before you go to bed or lie down.    Raise the head of your bed.  [employer] last reviewed this educational content on 4/1/2019 2000-2020 [employer]. [address] [place] [address], [place] [address]. All rights reserved. This information is not intended as a substitute for professional medical care. Always follow your healthcare professional's instructions.           Patient Education     Uncertain Causes of Chest Pain    Chest pain can happen for a number of reasons. Sometimes the cause can't be determined. If your condition does not seem serious, and your pain does not appear to be coming from your heart, your healthcare provider may recommend watching it closely. Sometimes the signs of a serious problem take more time to appear. Many problems not related to your heart can cause chest pain. These include:    Musculoskeletal. Costochondritis is an inflammation of the tissues around the ribs that can occur from trauma or overuse injuries, or a strain of the muscles of the chest wall    Respiratory. Pneumonia, collapsed lung (pneumothorax), or inflammation of the lining of the chest and lungs (pleurisy)    Gastrointestinal. Esophageal reflux, heartburn, ulcers, or gallbladder disease    Anxiety and panic disorders    Nerve compression and inflammation    Rare miscellaneous problems such as aortic aneurysm (a  swelling of the large artery coming out of the heart) or pulmonary embolism (a blood clot in the lungs)  Home care  After your visit, follow these recommendations:    Rest today and avoid strenuous activity.    Take any prescribed medicine as directed.    Be aware of any recurrent chest pain and notice any changes  Follow-up care  Follow up with your healthcare provider if you do not start to feel better within 24 hours, or as advised.  Call 911  Call 911 if any of these occur:    A change in the type of pain: if it feels different, becomes more severe, lasts longer, or begins to spread into your shoulder, arm, neck, jaw or back    Shortness of breath or increased pain with breathing    Weakness, dizziness, or fainting    Rapid heart beat    Crushing sensation in your chest  When to seek medical advice  Call your healthcare provider right away if any of the following occur:    Cough with dark colored sputum (phlegm) or blood    Fever of 100.4 F (38 C) or higher, or as directed by your healthcare provider    Swelling, pain or redness in one leg  [employer] last reviewed this educational content on 5/1/2018 2000-2020 [employer]. [address] [place] [address]. All rights reserved. This information is not intended as a substitute for professional medical care. Always follow your healthcare professional's instructions.

## 2021-01-21 NOTE — PROGRESS NOTES
Assessment & Plan     Left flank pain  Rib pain on left side  Patient is a 60-year-old male who presents to clinic for ongoing pain of left ribs which is initially thought to be related to fall while playing with grandson.  Patient was previously evaluated 12/15/2020 for pain.  Today, patient notes pain has not improved and is affecting quality of life.  Vital signs normal.  Physical exam significant for mild tenderness palpation of left lateral/inferior ribs, but no erythema, ecchymosis, crepitus, deformity to suggest fracture.  X-ray negative for fracture.     Considered cardiac etiology of pain.  Coronary calcium score from 2017 is 0.85.  EKG today without ST elevation/depression or Q waves.  We will complete troponin, but low suspicion for cardiac etiology given length of time symptoms have been persisting.  Kidney stone considered, but urinalysis without hematuria.  Low suspicion for PE as symptoms can be  reproduced with palpation, but will evaluate further with D-dimer.  differential diagnosis also includes costochondritis, muscle strain, gastritis, pancreatitis, cholelithiasis.  Will evaluate further with lab work.  Discuss symptoms that warrant urgent/emergent follow-up.    - UA reflex to Microscopic and Culture  - D dimer, quantitative  - CBC with platelets  - Basic metabolic panel  (Ca, Cl, CO2, Creat, Gluc, K, Na, BUN)  - Lipase  - Hepatic panel (Albumin, ALT, AST, Bili, Alk Phos, TP)  - Troponin I  - XR Ribs & Chest Left G/E 3 Views  - EKG 12-lead complete w/read - Clinics      See Patient Instructions    Return in about 2 weeks (around 2/4/2021), or if symptoms worsen or fail to improve.    Leonora De Leon PA-C  [facility] KWAKU Fernandez is a 60 year old who presents to clinic today for the following health issues: Left rib/flank pain    HPI     Abdominal/Flank Pain  Onset/Duration: Ongoing since last visit 12/15/2020 that is worse with pushing on the left  "lateral/posterior ribs and left flank.  Patient notes this affects his daily activities as he is very active.  Description:   Character: dull ache with some intermittent sharp/jabbing pain  Location: Left side of ribs/flank  Radiation: radiation to backside of ribs and now noticing some pain on right side as well  Intensity: severe  Progression of Symptoms:  worsening  Accompanying Signs & Symptoms:  Fever/Chills: no  Gas/Bloating: no  Nausea: no  Vomitting: no  Diarrhea: no  Constipation: no  Dysuria or Hematuria: no  History:   Trauma: no but this began after falling when playing with grandson  Previous similar pain: YES  Previous tests done: none  Precipitating factors:   Does the pain change with:     Food: YES- after eating pain is worse    Bowel Movement: no    Urination: no   Other factors:  no  Therapies tried and outcome: Ibuprofen, message          Review of Systems   Constitutional: Negative for chills and fever.   HENT: Negative for congestion.    Respiratory: Negative for shortness of breath.    Cardiovascular: Negative for chest pain.   Gastrointestinal: Negative for abdominal pain, nausea and vomiting.   Genitourinary: Negative for dysuria and urgency.   Musculoskeletal:        Left rib pain    Skin: Negative for rash.   Neurological: Negative for light-headedness.   Psychiatric/Behavioral: The patient is not nervous/anxious.             Objective    /72   Pulse 62   Temp 97.2  F (36.2  C) (Tympanic)   Resp 16   Ht 1.803 m (5' 11\")   Wt 75.4 kg (166 lb 2 oz)   BMI 23.17 kg/m    Body mass index is 23.17 kg/m .  Physical Exam  Vitals signs and nursing note reviewed.   Constitutional:       General: He is not in acute distress.     Appearance: Normal appearance.   HENT:      Head: Normocephalic and atraumatic.   Eyes:      Extraocular Movements: Extraocular movements intact.      Pupils: Pupils are equal, round, and reactive to light.   Neck:      Musculoskeletal: Normal range of motion. "   Cardiovascular:      Rate and Rhythm: Normal rate and regular rhythm.      Heart sounds: Normal heart sounds. No murmur.   Pulmonary:      Effort: Pulmonary effort is normal.      Breath sounds: Normal breath sounds. No wheezing.      Comments: Mild tenderness palpation of lateral/posterior left ribs.  No erythema, swelling, deformity, crepitus.  Abdominal:      General: Bowel sounds are normal. There is no distension.      Palpations: Abdomen is soft. There is no mass.      Tenderness: There is no abdominal tenderness. There is no right CVA tenderness, left CVA tenderness, guarding or rebound.      Hernia: No hernia is present.   Musculoskeletal: Normal range of motion.      Right lower leg: No edema.      Left lower leg: No edema.   Skin:     General: Skin is warm and dry.   Neurological:      General: No focal deficit present.      Mental Status: He is alert.   Psychiatric:         Mood and Affect: Mood normal.         Behavior: Behavior normal.          EKG - Reviewed and interpreted by me appears normal, NSR, normal axis, normal intervals, no acute ST/T changes c/w ischemia, no LVH by voltage criteria    CBC, BMP, D-dimer, lipase, LFTs, troponin pending    Results for orders placed or performed in visit on 01/21/21   XR Ribs & Chest Left G/E 3 Views     Status: None    Narrative    LEFT RIBS AND CHEST THREE VIEWS  1/21/2021 3:12 PM     HISTORY: Fall onto right side in December, ongoing left rib pain. Rib  pain on left side.    COMPARISON: CT exam performed February 5, 2018      Impression    IMPRESSION: Heart size is normal. No pleural effusion, pneumothorax,  or abnormal area of consolidation. Radiopaque marker overlies the  inferior left hemithorax. No definite underlying rib fracture.    TERRI LIAO MD   UA reflex to Microscopic and Culture     Status: None    Specimen: Midstream Urine   Result Value Ref Range    Color Urine Yellow     Appearance Urine Clear     Glucose Urine Negative NEG^Negative  mg/dL    Bilirubin Urine Negative NEG^Negative    Ketones Urine Negative NEG^Negative mg/dL    Specific Gravity Urine 1.010 1.003 - 1.035    Blood Urine Negative NEG^Negative    pH Urine 7.0 5.0 - 7.0 pH    Protein Albumin Urine Negative NEG^Negative mg/dL    Urobilinogen Urine 0.2 0.2 - 1.0 EU/dL    Nitrite Urine Negative NEG^Negative    Leukocyte Esterase Urine Negative NEG^Negative    Source Midstream Urine

## 2021-01-22 ENCOUNTER — OFFICE VISIT (OUTPATIENT)
Dept: LAB | Facility: CLINIC | Age: 61
End: 2021-01-22
Payer: COMMERCIAL

## 2021-01-22 DIAGNOSIS — Z11.59 ENCOUNTER FOR SCREENING FOR OTHER VIRAL DISEASES: ICD-10-CM

## 2021-01-22 LAB
SARS-COV-2 RNA RESP QL NAA+PROBE: NORMAL
SPECIMEN SOURCE: NORMAL

## 2021-01-22 PROCEDURE — U0005 INFEC AGEN DETEC AMPLI PROBE: HCPCS | Performed by: SURGERY

## 2021-01-22 PROCEDURE — U0003 INFECTIOUS AGENT DETECTION BY NUCLEIC ACID (DNA OR RNA); SEVERE ACUTE RESPIRATORY SYNDROME CORONAVIRUS 2 (SARS-COV-2) (CORONAVIRUS DISEASE [COVID-19]), AMPLIFIED PROBE TECHNIQUE, MAKING USE OF HIGH THROUGHPUT TECHNOLOGIES AS DESCRIBED BY CMS-2020-01-R: HCPCS | Performed by: SURGERY

## 2021-01-23 LAB
LABORATORY COMMENT REPORT: NORMAL
SARS-COV-2 RNA RESP QL NAA+PROBE: NEGATIVE
SPECIMEN SOURCE: NORMAL

## 2021-01-25 ENCOUNTER — ANESTHESIA (OUTPATIENT)
Dept: GASTROENTEROLOGY | Facility: CLINIC | Age: 61
End: 2021-01-25
Payer: COMMERCIAL

## 2021-01-25 ENCOUNTER — HOSPITAL ENCOUNTER (OUTPATIENT)
Facility: CLINIC | Age: 61
Discharge: HOME | End: 2021-01-25
Attending: SURGERY | Admitting: SURGERY
Payer: COMMERCIAL

## 2021-01-25 VITALS
OXYGEN SATURATION: 97 % | DIASTOLIC BLOOD PRESSURE: 76 MMHG | HEIGHT: 71 IN | HEART RATE: 53 BPM | WEIGHT: 166 LBS | RESPIRATION RATE: 20 BRPM | BODY MASS INDEX: 23.24 KG/M2 | SYSTOLIC BLOOD PRESSURE: 102 MMHG | TEMPERATURE: 97.5 F

## 2021-01-25 LAB — COLONOSCOPY: NORMAL

## 2021-01-25 PROCEDURE — G0121 COLON CA SCRN NOT HI RSK IND: HCPCS | Performed by: SURGERY

## 2021-01-25 PROCEDURE — 370N000017 HC ANESTHESIA TECHNICAL FEE, PER MIN: Performed by: SURGERY

## 2021-01-25 PROCEDURE — 250N000009 HC RX 250: Performed by: SURGERY

## 2021-01-25 PROCEDURE — 258N000003 HC RX IP 258 OP 636: Performed by: SURGERY

## 2021-01-25 PROCEDURE — 250N000011 HC RX IP 250 OP 636: Performed by: NURSE ANESTHETIST, CERTIFIED REGISTERED

## 2021-01-25 PROCEDURE — 45378 DIAGNOSTIC COLONOSCOPY: CPT | Performed by: SURGERY

## 2021-01-25 RX ORDER — LIDOCAINE 40 MG/G
CREAM TOPICAL
Status: DISCONTINUED | OUTPATIENT
Start: 2021-01-25 | End: 2021-01-25 | Stop reason: HOSPADM

## 2021-01-25 RX ORDER — ONDANSETRON 2 MG/ML
4 INJECTION INTRAMUSCULAR; INTRAVENOUS
Status: DISCONTINUED | OUTPATIENT
Start: 2021-01-25 | End: 2021-01-25 | Stop reason: HOSPADM

## 2021-01-25 RX ORDER — PROPOFOL 10 MG/ML
INJECTION, EMULSION INTRAVENOUS PRN
Status: DISCONTINUED | OUTPATIENT
Start: 2021-01-25 | End: 2021-01-25

## 2021-01-25 RX ORDER — SODIUM CHLORIDE, SODIUM LACTATE, POTASSIUM CHLORIDE, CALCIUM CHLORIDE 600; 310; 30; 20 MG/100ML; MG/100ML; MG/100ML; MG/100ML
INJECTION, SOLUTION INTRAVENOUS CONTINUOUS
Status: DISCONTINUED | OUTPATIENT
Start: 2021-01-25 | End: 2021-01-25 | Stop reason: HOSPADM

## 2021-01-25 RX ADMIN — PROPOFOL 150 MG: 10 INJECTION, EMULSION INTRAVENOUS at 09:03

## 2021-01-25 RX ADMIN — PROPOFOL 50 MG: 10 INJECTION, EMULSION INTRAVENOUS at 09:07

## 2021-01-25 RX ADMIN — LIDOCAINE HYDROCHLORIDE 1 ML: 10 INJECTION, SOLUTION EPIDURAL; INFILTRATION; INTRACAUDAL; PERINEURAL at 08:48

## 2021-01-25 RX ADMIN — PROPOFOL 150 MG: 10 INJECTION, EMULSION INTRAVENOUS at 09:05

## 2021-01-25 RX ADMIN — SODIUM CHLORIDE, POTASSIUM CHLORIDE, SODIUM LACTATE AND CALCIUM CHLORIDE: 600; 310; 30; 20 INJECTION, SOLUTION INTRAVENOUS at 08:47

## 2021-01-25 ASSESSMENT — MIFFLIN-ST. JEOR: SCORE: 1585.1

## 2021-01-25 NOTE — H&P
"60 year old year old male here for colonoscopy for screening.    Patient Active Problem List   Diagnosis     Presbyopias     Hyperopia     Hyperlipidemia with target LDL less than 130     Impaired fasting glucose     High risk medications (not anticoagulants) long-term use     Advanced directives, counseling/discussion     Seropositive rheumatoid arthritis of multiple sites (HCC)     Hypertension goal BP (blood pressure) < 140/90     Kidney stone       Past Medical History:   Diagnosis Date     Hyperlipidemia LDL goal < 130     declines statins -- favorable CAC in 2/18     Hypertension goal BP (blood pressure) < 140/90 11/17/2015    NL stress test 4/1/16, med started 11/16     Impaired fasting glucose      Kidney stone 12/8/2016     Seropositive rheumatoid arthritis of multiple sites (H) 11/17/2015       Past Surgical History:   Procedure Laterality Date     COLONOSCOPY  11/5/2010    COLONOSCOPY performed by FERMIN MCCLELLAND at [facility]     TONSILLECTOMY  1964    AGE 4       @FMX@    No current outpatient medications on file.       Allergies   Allergen Reactions     Azithromycin Diarrhea       Pt reports that he has never smoked. He has never used smokeless tobacco. He reports current alcohol use. He reports that he does not use drugs.    Exam:  BP (!) 116/91 (BP Location: Left arm)   Pulse 74   Temp 97.5  F (36.4  C) (Oral)   Resp 20   Ht 1.803 m (5' 11\")   Wt 75.3 kg (166 lb)   SpO2 98%   BMI 23.15 kg/m      Awake, Alert OX3  Lungs - CTA bilaterally  CV - RRR, no murmurs, distal pulses intact  Abd - soft, non-distended, non-tender, +BS  Extr - No cyanosis or edema    A/P 60 year old year old male in need of colonoscopy for screening. Risks, benefits, alternatives, and complications were discussed including the possibility of perforation and the patient agreed to proceed    Anderson Heredia MD     "

## 2021-01-25 NOTE — ANESTHESIA CARE TRANSFER NOTE
Patient: Bora Monroy    Procedure(s):  COLONOSCOPY    Diagnosis: Colon cancer screening [Z12.11]  Diagnosis Additional Information: No value filed.    Anesthesia Type:   MAC     Note:    Oropharynx: oropharynx clear of all foreign objects  Level of Consciousness: awake  Oxygen Supplementation: room air    Independent Airway: airway patency satisfactory and stable  Dentition: dentition unchanged  Vital Signs Stable: post-procedure vital signs reviewed and stable  Report to RN Given: handoff report given  Patient transferred to: Phase II    Handoff Report: Identifed the Patient, Identified the Reponsible Provider, Reviewed the pertinent medical history, Discussed the surgical course, Reviewed Intra-OP anesthesia mangement and issues during anesthesia, Set expectations for post-procedure period and Allowed opportunity for questions and acknowledgement of understanding      Vitals: (Last set prior to Anesthesia Care Transfer)  CRNA VITALS  1/25/2021 0848 - 1/25/2021 0918      1/25/2021             Pulse:  60    SpO2:  98 %        Electronically Signed By: JOYCE Renee CRNA  January 25, 2021  9:18 AM

## 2021-01-25 NOTE — ANESTHESIA POSTPROCEDURE EVALUATION
Patient: Bora Monroy    Procedure(s):  COLONOSCOPY    Diagnosis:Colon cancer screening [Z12.11]  Diagnosis Additional Information: No value filed.    Anesthesia Type:  MAC    Note:  Disposition: Outpatient   Postop Pain Control: Uneventful            Sign Out: Well controlled pain   PONV: No   Neuro/Psych: Uneventful            Sign Out: Acceptable/Baseline neuro status   Airway/Respiratory: Uneventful            Sign Out: Acceptable/Baseline resp. status   CV/Hemodynamics: Uneventful            Sign Out: Acceptable CV status   Other NRE: NONE   DID A NON-ROUTINE EVENT OCCUR? No         Last vitals:  Vitals:    01/25/21 0808 01/25/21 0915   BP: (!) 116/91 104/68   Pulse: 74    Resp: 20    Temp: 36.4  C (97.5  F)    SpO2: 98%        Electronically Signed By: JOYCE Renee CRNA  January 25, 2021  9:23 AM

## 2021-02-10 ENCOUNTER — TELEPHONE (OUTPATIENT)
Dept: FAMILY MEDICINE | Facility: CLINIC | Age: 61
End: 2021-02-10

## 2021-02-10 DIAGNOSIS — R10.9 LEFT FLANK PAIN: Primary | ICD-10-CM

## 2021-02-10 DIAGNOSIS — R07.81 RIB PAIN ON LEFT SIDE: ICD-10-CM

## 2021-02-10 NOTE — TELEPHONE ENCOUNTER
Please see message below, per result note, US of kidney could be considered.  Kamla Nj RN  [facility]

## 2021-02-10 NOTE — TELEPHONE ENCOUNTER
Reason for call:  Order   Order or referral being requested: Ultrasound  Reason for request: Patient not getting better  Date needed: as soon as possible  Has the patient been seen by the PCP for this problem? Not Applicable    Additional comments: Patient was seen with RAGHU De Leon for rib pain and was told if it does not improve he needs to have an ultrasound. There are no orders for imaging, so patient would like to get that ordered. Please inform patient if it is ordered or if he needs to be seen by his PCP for that.    Phone number to reach patient:  Home number on file [phone] (home)    Best Time:  Any time    Can we leave a detailed message on this number?  YES    Travel screening: Not Applicable

## 2021-02-11 NOTE — TELEPHONE ENCOUNTER
Please call patient: As pain has persisted, I would suggest we continue with a CT scan for evaluation of the left flank and lower ribs. This will look for a kidney stone as well as other pathology. I have placed the order. Patient to call [phone] to schedule. I will follow up with patient regarding results.     Leonora De Leon PA-C on 2/10/2021 at 7:32 PM

## 2021-02-12 NOTE — TELEPHONE ENCOUNTER
Left message on voice mail for patient to call clinic.   [phone]  Kamla Nj RN  [facility]

## 2021-02-15 ENCOUNTER — VIRTUAL VISIT (OUTPATIENT)
Dept: RHEUMATOLOGY | Facility: CLINIC | Age: 61
End: 2021-02-15
Payer: COMMERCIAL

## 2021-02-15 DIAGNOSIS — M05.79 SEROPOSITIVE RHEUMATOID ARTHRITIS OF MULTIPLE SITES (H): Primary | ICD-10-CM

## 2021-02-15 DIAGNOSIS — Z79.899 HIGH RISK MEDICATION USE: ICD-10-CM

## 2021-02-15 PROCEDURE — 99214 OFFICE O/P EST MOD 30 MIN: CPT | Mod: TEL | Performed by: INTERNAL MEDICINE

## 2021-02-15 NOTE — PROGRESS NOTES
Jim is a 60 year old who is being evaluated via a billable telephone visit.      What phone number would you like to be contacted at? [phone]  How would you like to obtain your AVS? [facility]    Rheumatology Telephone/Telehealth  Visit      Bora Monroy MRN# [id number]   YOB: 1960 Age: 60 year old      Date of visit: 2/15/21   PCP: Dr. Yakov Jones     Chief Complaint     Patient presents with:  RECHECK: Seropositive rheumatoid arthritis of multiple sites (HCC)    Assessment and Plan     1. Seropositive rheumatoid arthritis (RF negative, CCP positive): Currently on leflunomide 20 mg every other day; he has not required prednisone for approximately 2 years, per patient.  Doing well at this time with regard to rheumatoid arthritis.  Chronic illness, stable.    - Continue leflunomide 20 mg every other day  - Labs every 8-12 weeks: CBC, Creatinine, Hepatic Panel, ESR, CRP     High risk medication requiring intensive toxicity monitoring at least quarterly: labs ordered include CBC, Creatinine, Hepatic panel to monitor for cytopenia and hepatotoxicity; checking creatinine as it affects clearance of medication.     2.  Vaccinations: Vaccinations reviewed with Mr. Monroy.  Risks and benefits of vaccinations were discussed.    - Influenza: encouraged yearly vaccination  - [id number]: Advised receiving  - [id number]: to receive at least 8 weeks after [id number] is administered  - Shingrix: Advised receiving    3.  Rib pain/flank pain: Following with his primary care provider regarding this issue and going to have imaging done tomorrow.  Started after a fall when playing in the snow    # At this time the COVID-19 vaccine (currently available from [employer] and [employer]) is recommended based on our knowledge of this vaccine and vaccines in the past.  Based on the data for the mRNA COVID-19 vaccines available in the [place], there is no preference for one COVID-19 vaccine over another. Note that there is  no data currently available to specifically establish safety and efficacy for this vaccine in immunocompromised people.    #  Instructed that if confirmed to have COVID-19 or exposure to someone with confirmed COVID-19 to call this clinic for directions on DMARD management.    # This is a virtual visit to reduce the risk of COVID-19 exposure during this current pandemic.      # Considered to be at high risk of complications from the COVID-19 virus.  It is recommended to limit contact with other people and if possible to work remotely or provide a leave of absence to reduce the risk for COVID-19.      Total minutes spent in evaluation with patient, documentation, , and review of pertinent studies and chart notes: 31     Mr. Monroy verbalized agreement with and understanding of the rational for the diagnosis and treatment plan.  All questions were answered to best of my ability and the patient's satisfaction. Mr. Monroy was advised to contact the clinic with any questions that may arise after the clinic visit.      Thank you for involving me in the care of the patient    Return to clinic: 6 months (pt preference)    HPI   Bora Monroy is a 60 year old male hyperlipidemia, and seropositive rheumatoid arthritis who is here for follow-up of RA.     Today, 2/15/2021: RA is doing well.  Foot pain has resolved; no plantar fasciitis symptoms at this time.  When playing in the snow he fell down that resulted in rib pain that has not improved so he is seeing his primary care team and going to have a CT scan done tomorrow.    Denies fevers, chills, nausea, vomiting, constipation, diarrhea. No abdominal pain. No chest pain/pressure, palpitations, or shortness of breath. No oral or nasal sores. No neck pain. No rash. No LE swelling.      ROS   12 point review of system was completed and negative except as noted in the HPI     Active Problem List     Patient Active Problem List   Diagnosis     Presbyopias      Hyperopia     Hyperlipidemia with target LDL less than 130     Impaired fasting glucose     High risk medications (not anticoagulants) long-term use     Advanced directives, counseling/discussion     Seropositive rheumatoid arthritis of multiple sites (HCC)     Hypertension goal BP (blood pressure) < 140/90     Kidney stone     Past Medical History     Past Medical History:   Diagnosis Date     Hyperlipidemia LDL goal < 130     declines statins -- favorable CAC in 2/18     Hypertension goal BP (blood pressure) < 140/90 11/17/2015    NL stress test 4/1/16, med started 11/16     Impaired fasting glucose      Kidney stone 12/8/2016     Seropositive rheumatoid arthritis of multiple sites (H) 11/17/2015     Past Surgical History     Past Surgical History:   Procedure Laterality Date     COLONOSCOPY  11/5/2010    COLONOSCOPY performed by FERMIN MCCLELLAND at [facility]     COLONOSCOPY N/A 1/25/2021    Procedure: COLONOSCOPY;  Surgeon: Anderson Heredia MD;  Location: [facility]     TONSILLECTOMY  1964    AGE 4     Allergy     Allergies   Allergen Reactions     Azithromycin Diarrhea     Current Medication List     Current Outpatient Medications   Medication Sig     amLODIPine-benazepril (LOTREL) 5-20 MG capsule Take 1 capsule by mouth daily     Cholecalciferol (VITAMIN D PO) Take 1,000 Units by mouth     cinnamon 500 MG TABS Take 1 tablet by mouth daily      FISH OIL 1000 MG OR CAPS 1 tablet daily     glucosamine-chondroitin 500-400 MG CAPS per capsule Take 1 capsule by mouth daily     KRILL OIL PO Take 1 tablet by mouth daily     leflunomide (ARAVA) 20 MG tablet Take 1 tablet (20 mg) by mouth every other day     Misc Natural Products (TURMERIC CURCUMIN) CAPS Take by mouth daily     MULTI-VITAMIN OR TABS 1 TABLET DAILY     psyllium (METAMUCIL) 30.9 % POWD Take 1 tsp by mouth 2 times daily.     triamcinolone (KENALOG) 0.1 % external cream Apply sparingly to affected area two to three times daily as needed for hand rash     VITAMIN C  "OR None Entered     VITAMIN E PO      No current facility-administered medications for this visit.      Social History     See HPI    Family History     Family History   Problem Relation Age of Onset     Lipids Sister      Diabetes Sister      Lipids Father      Hypertension Father      Cerebrovascular Disease Maternal Aunt      Cancer No family hx of        Physical Exam     Temp Readings from Last 3 Encounters:   01/25/21 97.5  F (36.4  C) (Oral)   01/21/21 97.2  F (36.2  C) (Tympanic)   12/15/20 97.4  F (36.3  C) (Tympanic)     BP Readings from Last 5 Encounters:   01/25/21 102/76   01/21/21 134/72   12/15/20 (!) 144/78   12/07/20 (!) 179/82   07/07/20 (!) 142/82     Pulse Readings from Last 1 Encounters:   01/25/21 53     Resp Readings from Last 1 Encounters:   01/25/21 (P) 16     Estimated body mass index is 23.15 kg/m  as calculated from the following:    Height as of 1/25/21: 1.803 m (5' 11\").    Weight as of 1/25/21: 75.3 kg (166 lb).    GEN: alert and no distress  PSYCH: Alert; coherent speech, normal rate and volume, able to articulate logical thoughts, able   to abstract reason, no tangential thoughts. Normal affect.   RESP: No cough, no audible wheezing, able to talk in full sentences  Remainder of exam unable to be completed due to telephone visits     Labs     CBC  Recent Labs   Lab Test 01/21/21  1537 11/30/20  0844 08/19/20  1513 05/19/20  1454   WBC 4.8 4.3 5.3 5.3   RBC 4.33* 4.57 4.37* 4.35*   HGB 14.3 15.0 14.4 14.4   HCT 40.8 42.7 40.8 41.0   MCV 94 93 93 94   RDW 11.5 11.7 11.6 11.7    192 205 211   MCH 33.0 32.8 33.0 33.1*   MCHC 35.0 35.1 35.3 35.1   NEUTROPHIL  --  55.2 57.6 63.1   LYMPH  --  34.3 32.3 28.3   MONOCYTE  --  8.8 8.8 7.3   EOSINOPHIL  --  1.2 0.9 0.9   BASOPHIL  --  0.5 0.4 0.4   ANEU  --  2.4 3.1 3.4   ALYM  --  1.5 1.7 1.5   MANDI  --  0.4 0.5 0.4   AEOS  --  0.1 0.1 0.1   ABAS  --  0.0 0.0 0.0     CMP  Recent Labs   Lab Test 01/21/21  1537 11/30/20  0844 08/19/20  1513 " 11/19/19  0941 11/19/19  0941    138  --   --  137   POTASSIUM 4.4 3.9  --   --  3.9   CHLORIDE 102 104  --   --  102   CO2 30 29  --   --  28   ANIONGAP 4 5  --   --  7   GLC 98 108*  --   --  102*   BUN 12 12  --   --  9   CR 0.86 0.80  0.83 0.88   < > 0.81   GFRESTIMATED >90 >90  >90 >90   < > >90   GFRESTBLACK >90 >90  >90 >90   < > >90   MOLINA 9.1 8.8  --   --  9.0   BILITOTAL 0.7 0.5 0.5   < > 0.7   ALBUMIN 4.4 4.2 4.3   < > 4.1   PROTTOTAL 7.2 7.0 6.9   < > 6.9   ALKPHOS 85 74 80   < > 82   AST 23 17 17   < > 16   ALT 32 32 35   < > 27    < > = values in this interval not displayed.     Calcium/VitaminD  Recent Labs   Lab Test 01/21/21  1537 11/30/20  0844 11/19/19  0941 10/12/15  1439 10/12/15  1439   MOLINA 9.1 8.8 9.0   < >  --    VITDT  --   --   --   --  46    < > = values in this interval not displayed.     ESR/CRP  Recent Labs   Lab Test 11/30/20  0844 08/19/20  1513 02/17/20  0818   SED 3 5 4   CRP <2.9 <2.9 <2.9     HIV Screening  Recent Labs   Lab Test 10/16/18  0757   HIAGAB Nonreactive         Immunization History     Immunization History   Administered Date(s) Administered     Influenza (IIV3) PF 10/29/2007, 10/13/2012, 10/15/2013     Influenza Vaccine IM > 6 months Valent IIV4 10/30/2014, 10/29/2015, 10/15/2016, 10/13/2017, 10/15/2018, 10/15/2019     Pneumococcal 23 valent 10/31/2002     TD (ADULT, 7+) 09/03/2004     TDAP Vaccine (Boostrix) 11/13/2014          Chart documentation done in part with Dragon Voice recognition Software. Although reviewed after completion, some word and grammatical error may remain.    Phone call duration with patient (in minutes): 22    Location of patient: [place], [place]   Location of provider: [place] M[facility]iegos [place]

## 2021-02-16 ENCOUNTER — ANCILLARY PROCEDURE (OUTPATIENT)
Dept: CT IMAGING | Facility: CLINIC | Age: 61
End: 2021-02-16
Attending: PHYSICIAN ASSISTANT
Payer: COMMERCIAL

## 2021-02-16 DIAGNOSIS — R10.9 LEFT FLANK PAIN: ICD-10-CM

## 2021-02-16 DIAGNOSIS — R07.81 RIB PAIN ON LEFT SIDE: ICD-10-CM

## 2021-02-16 PROCEDURE — 74178 CT ABD&PLV WO CNTR FLWD CNTR: CPT | Mod: TC | Performed by: RADIOLOGY

## 2021-02-16 RX ORDER — IOPAMIDOL 755 MG/ML
200 INJECTION, SOLUTION INTRAVASCULAR ONCE
Status: COMPLETED | OUTPATIENT
Start: 2021-02-16 | End: 2021-02-16

## 2021-02-16 RX ADMIN — IOPAMIDOL 99 ML: 755 INJECTION, SOLUTION INTRAVASCULAR at 15:52

## 2021-02-16 RX ADMIN — Medication 140 ML: at 15:52

## 2021-03-16 ENCOUNTER — IMMUNIZATION (OUTPATIENT)
Dept: NURSING | Facility: CLINIC | Age: 61
End: 2021-03-16
Payer: COMMERCIAL

## 2021-03-16 PROCEDURE — 0001A PR COVID VAC PFIZER DIL RECON 30 MCG/0.3 ML IM: CPT

## 2021-03-16 PROCEDURE — 91300 PR COVID VAC PFIZER DIL RECON 30 MCG/0.3 ML IM: CPT

## 2021-03-22 DIAGNOSIS — M05.79 SEROPOSITIVE RHEUMATOID ARTHRITIS OF MULTIPLE SITES (H): ICD-10-CM

## 2021-03-22 DIAGNOSIS — Z79.899 HIGH RISK MEDICATION USE: ICD-10-CM

## 2021-03-22 LAB
ALBUMIN SERPL-MCNC: 4.3 G/DL (ref 3.4–5)
ALP SERPL-CCNC: 77 U/L (ref 40–150)
ALT SERPL W P-5'-P-CCNC: 25 U/L (ref 0–70)
AST SERPL W P-5'-P-CCNC: 12 U/L (ref 0–45)
BASOPHILS # BLD AUTO: 0 10E9/L (ref 0–0.2)
BASOPHILS NFR BLD AUTO: 0.4 %
BILIRUB DIRECT SERPL-MCNC: 0.2 MG/DL (ref 0–0.2)
BILIRUB SERPL-MCNC: 0.6 MG/DL (ref 0.2–1.3)
CREAT SERPL-MCNC: 1.02 MG/DL (ref 0.66–1.25)
CRP SERPL-MCNC: <2.9 MG/L (ref 0–8)
DIFFERENTIAL METHOD BLD: ABNORMAL
EOSINOPHIL # BLD AUTO: 0.1 10E9/L (ref 0–0.7)
EOSINOPHIL NFR BLD AUTO: 1.1 %
ERYTHROCYTE [DISTWIDTH] IN BLOOD BY AUTOMATED COUNT: 11.6 % (ref 10–15)
ERYTHROCYTE [SEDIMENTATION RATE] IN BLOOD BY WESTERGREN METHOD: 4 MM/H (ref 0–20)
GFR SERPL CREATININE-BSD FRML MDRD: 79 ML/MIN/{1.73_M2}
HCT VFR BLD AUTO: 37.9 % (ref 40–53)
HGB BLD-MCNC: 13.1 G/DL (ref 13.3–17.7)
LYMPHOCYTES # BLD AUTO: 1.6 10E9/L (ref 0.8–5.3)
LYMPHOCYTES NFR BLD AUTO: 29.9 %
MCH RBC QN AUTO: 33.5 PG (ref 26.5–33)
MCHC RBC AUTO-ENTMCNC: 34.6 G/DL (ref 31.5–36.5)
MCV RBC AUTO: 97 FL (ref 78–100)
MONOCYTES # BLD AUTO: 0.5 10E9/L (ref 0–1.3)
MONOCYTES NFR BLD AUTO: 9.3 %
NEUTROPHILS # BLD AUTO: 3.1 10E9/L (ref 1.6–8.3)
NEUTROPHILS NFR BLD AUTO: 59.3 %
PLATELET # BLD AUTO: 227 10E9/L (ref 150–450)
PROT SERPL-MCNC: 6.8 G/DL (ref 6.8–8.8)
RBC # BLD AUTO: 3.91 10E12/L (ref 4.4–5.9)
WBC # BLD AUTO: 5.3 10E9/L (ref 4–11)

## 2021-03-22 PROCEDURE — 82565 ASSAY OF CREATININE: CPT | Performed by: INTERNAL MEDICINE

## 2021-03-22 PROCEDURE — 85652 RBC SED RATE AUTOMATED: CPT | Performed by: INTERNAL MEDICINE

## 2021-03-22 PROCEDURE — 86140 C-REACTIVE PROTEIN: CPT | Performed by: INTERNAL MEDICINE

## 2021-03-22 PROCEDURE — 36415 COLL VENOUS BLD VENIPUNCTURE: CPT | Performed by: INTERNAL MEDICINE

## 2021-03-22 PROCEDURE — 85025 COMPLETE CBC W/AUTO DIFF WBC: CPT | Performed by: INTERNAL MEDICINE

## 2021-03-22 PROCEDURE — 80076 HEPATIC FUNCTION PANEL: CPT | Performed by: INTERNAL MEDICINE

## 2021-04-06 ENCOUNTER — IMMUNIZATION (OUTPATIENT)
Dept: NURSING | Facility: CLINIC | Age: 61
End: 2021-04-06
Attending: FAMILY MEDICINE
Payer: COMMERCIAL

## 2021-04-06 PROCEDURE — 0002A PR COVID VAC PFIZER DIL RECON 30 MCG/0.3 ML IM: CPT

## 2021-04-06 PROCEDURE — 91300 PR COVID VAC PFIZER DIL RECON 30 MCG/0.3 ML IM: CPT

## 2021-05-17 ENCOUNTER — TELEPHONE (OUTPATIENT)
Dept: RHEUMATOLOGY | Facility: CLINIC | Age: 61
End: 2021-05-17

## 2021-05-17 NOTE — TELEPHONE ENCOUNTER
Patient is having back pain and would like a appt with Dr. Yair DOHERTY.  He can't wait until the next available.

## 2021-05-18 NOTE — TELEPHONE ENCOUNTER
Called patient however there was no answer and the voicemail box was full. RN will try calling again later at another time.    Jona RUBALCAVA RN....5/18/2021 4:11 PM

## 2021-05-20 ENCOUNTER — OFFICE VISIT (OUTPATIENT)
Dept: FAMILY MEDICINE | Facility: CLINIC | Age: 61
End: 2021-05-20
Payer: COMMERCIAL

## 2021-05-20 VITALS
HEIGHT: 71 IN | WEIGHT: 163 LBS | SYSTOLIC BLOOD PRESSURE: 120 MMHG | TEMPERATURE: 97.9 F | DIASTOLIC BLOOD PRESSURE: 72 MMHG | HEART RATE: 60 BPM | BODY MASS INDEX: 22.82 KG/M2 | RESPIRATION RATE: 16 BRPM | OXYGEN SATURATION: 98 %

## 2021-05-20 DIAGNOSIS — M54.42 CHRONIC LEFT-SIDED LOW BACK PAIN WITH LEFT-SIDED SCIATICA: Primary | ICD-10-CM

## 2021-05-20 DIAGNOSIS — G89.29 CHRONIC LEFT-SIDED LOW BACK PAIN WITH LEFT-SIDED SCIATICA: Primary | ICD-10-CM

## 2021-05-20 PROCEDURE — 99214 OFFICE O/P EST MOD 30 MIN: CPT | Performed by: PHYSICIAN ASSISTANT

## 2021-05-20 ASSESSMENT — MIFFLIN-ST. JEOR: SCORE: 1566.49

## 2021-05-20 ASSESSMENT — PAIN SCALES - GENERAL: PAINLEVEL: EXTREME PAIN (8)

## 2021-05-20 NOTE — PATIENT INSTRUCTIONS
For further evaluation of your back pain, we are completing an MRI.  Please call [phone] to schedule your appointment.  For discomfort you may use Tylenol/ibuprofen and heat/ice.  Additionally, I have placed a referral to physical therapy.  Please call the number on your paperwork to schedule.  You can continue with your stretches and chiropractics as usual.    Please reach out with questions or concerns.    Patient Education     Understanding Lumbar Radiculopathy    Lumbar radiculopathy is irritation or inflammation of a nerve root in the low back. It causes symptoms that spread out from the back down one or both legs. To understand this condition, it helps to understand the parts of the spine:    Vertebrae. These are bones that stack to form the spine. The lumbar spine contains 5 vertebrae near the bottom of your spine.    Disks. These are soft pads of tissue between the vertebrae. They act as shock absorbers for the spine.    Spinal canal. This is a tunnel formed within the stacked vertebrae. In the lumbar spine, nerves run through this canal.    Nerves. These branch off and leave the spinal canal, traveling out to parts of the body. As they leave the spinal canal, nerves pass through openings between the vertebrae. The nerve root is the part of the nerve that is closest to the spinal canal.    Sciatic nerve. This is a large nerve formed from several nerve roots in the low back. This nerve extends down the back of the leg to the foot.  With lumbar radiculopathy, nerve roots in the low back become irritated. This leads to pain and symptoms. The sciatic nerve is commonly involved, so the condition is often called sciatica.  What causes lumbar radiculopathy?  Aging, injury, poor posture, extra body weight, and other issues can lead to problems in the low back. These problems may then irritate nerve roots. They include:    Damage to a disk in the lumbar spine. The damaged disk may then press on nearby nerve  roots.    Degeneration from wear and tear, and aging. This can lead to narrowing (stenosis) of the openings between the vertebrae. The narrowed openings press on nerve roots as they leave the spinal canal.    Unstable spine. This is when a vertebra slips forward. It can then press on a nerve root.  Other, less common things can put pressure on nerves in the low back. These include diabetes, infection, or a tumor.  Symptoms of lumbar radiculopathy  These include:    Pain in the low back    Pain, numbness, tingling, or weakness that travels into the buttocks, hip, groin, or leg    Muscle spasms in the low back, or leg  Treatment for lumbar radiculopathy  In most cases, your healthcare provider will first try treatments that help relieve symptoms. These may include:    Prescription and over-the-counter pain medicines. These help relieve pain, swelling, and irritation.    Limits on positions and activities that increase pain. But lying in bed or avoiding all movement is only recommended for a short period of time.    Physical therapy, including exercises and stretches. This helps decrease pain and increase movement and function.    Steroid shots into the lower back. This may help relieve symptoms for a time.    Weight-loss program. If you are overweight, losing extra pounds (kilograms) may help relieve symptoms.  In some cases, you may need surgery to fix the underlying problem. This depends on the cause, the symptoms, and how long the pain has lasted.  Possible complications  Over time, an irritated and inflamed nerve may become damaged. This may lead to long-lasting (permanent) numbness or weakness in your legs and feet. If symptoms change suddenly or get worse, be sure to let your healthcare provider know.  When to call your healthcare provider  Call your healthcare provider right away if you have any of these:    New pain or pain that gets worse    New or increasing weakness, tingling, or numbness in your leg or  "foot    Problems controlling your bladder or bowel  [employer] last reviewed this educational content on 2/1/2020 2000-2021 [employer]. All rights reserved. This information is not intended as a substitute for professional medical care. Always follow your healthcare professional's instructions.           Patient Education     * Sciatica     Sciatica (\"Lumbar Radiculopathy\") causes a pain that spreads from the lower back down into the buttock, hip and leg. Sometimes leg pain can occur without any back pain. Sciatica is due to irritation or pressure on a spinal nerve as it comes out of the spinal canal. This is most often due to pressure on the nerve from a nearby spinal disk (the cartilage cushion between each spinal bone). Other causes include spinal stenosis (narrowing of the spinal canal) and spasm of the piriformis muscle (a muscle in the buttocks that the sciatic nerve passes through).  Sciatica may begin after a sudden twisting/bending force (such as in a car accident), or sometimes after a simple awkward movement. In either case, muscle spasm is commonly present and can add to the pain.  The diagnosis of sciatica is made from the symptoms and physical exam. Unless you had a physical injury (such as a car accident or fall), X-rays are usually not ordered for the initial evaluation of sciatica because the nerves and disks cannot be seen on an X-ray. Most sciatica (80-90%) gets better with time.  What can I do about my low back pain?  There are three main things you can do to ease low back pain and help it go away.    Stay active! Use positions, movements and exercises. Too much rest can make your symptoms worse.    Use heat or cold packs.    Take medicine as directed.  Using positions, movements and exercises  Research tells us that moving your joints and muscles can help you recover from back pain. Such activity should be simple and gentle.  Use walking to help relieve your discomfort. Try " taking a short walk every 3 to 4 hours during the day. Walk for a few minutes inside your home or take longer walks outside, on a treadmill or at a mall. Slowly increase the amount of time you walk. Expect discomfort when you begin, but it should lessen as your back starts to recover.  Finding a position that is comfortable  When your back pain is new, you may find that certain positions will ease your pain. Gently try each of the following positions until you find one that eases your pain. Once you find a position of comfort, use it as often as you like while you recover. Return to your daily routine as soon as possible.    Lie on your back with your legs bent. You can do this by placing a pillow under your knees or lie on the floor and rest your lower legs on the seat of a chair.    Lie on your side with your knees bent and place a pillow between your knees.    Lie on your stomach over pillows.  Using heat or cold packs  Try cold packs or gentle heat to ease your pain. Use whichever gives you the most relief. Apply the cold pack or heat for 15 minutes at a time, as often as needed.  Taking medicine  If taking over-the-counter medicine:    Take ibuprofen (Advil, Motrin) 600 mg. three times a day as needed for pain.  OR    Take Aleve (naproxen sodium) 220 to 440 mg. two times a day as needed for pain.  If your doctor prescribed medicine, follow the instructions. Stop taking the medicine as soon as you can.  When should I call my doctor?  Your back pain should improve over the first couple of weeks. As it improves, you should be able to return to your normal activities. But call your doctor if:    You have a sudden change in your ability to control?your bladder or bowels.    You begin to feel tingling in your groin or legs.    The pain spreads down your leg and into your foot.    Your toes, feet or leg muscles begin to feel weak.    You feel generally unwell or sick.    Your pain gets worse.  For informational  purposes only. Not to replace the advice of your health care provider.  Copyright   2018 [employer]. All rights reserved.

## 2021-05-20 NOTE — PROGRESS NOTES
Assessment & Plan     Chronic left-sided low back pain with left-sided sciatica  Patient is a 61-year-old male who presents clinic due to 5 weeks of intermittent low back pain and stiffness.  Pain is worsening with radiation into left lower extremity.  Vital signs normal.  Physical exam significant for positive straight leg raise on left.  Shared decision making completed with patient.  Given history of lumbar disc herniation as well as progression of recent symptoms, will proceed with MRI for further evaluation.  Additionally, recommended heat/ice, avoiding activities that cause discomfort, Tylenol/ibuprofen, and physical therapy.  Referral placed.  Discussed return precautions.  - MR Lumbar Spine w/o Contrast; Future  - [facility] PT AND HAND REFERRAL; Future     See Patient Instructions    Return in about 2 weeks (around 6/3/2021), or if symptoms worsen or fail to improve.    Leonora De Leon PA-C  [facility] KWAKU Fernandez is a 61 year old who presents for the following health issues     HPI     Back Pain  Onset/Duration: 5 weeks intermittently low back pain and stiffness. No weakness, numbness or tingling in the left leg.  Patient notes that pain has started to radiate to left hip, groin, and down into calf.  Patient notes history of disc herniation at L3 following work injury.  Description:   Location of pain: low back left feels stiff.  Character of pain: dull pain with a stiff and locking feeling  Pain radiation: radiates to left hip and groin and down into calf   New numbness or weakness in legs, not attributed to pain: no   Intensity: Currently 8/10  Progression of Symptoms: worsening  History:   Specific cause: none  Pain interferes with job: YES  History of back problems: Herniated disc (T8-1986 and L3-1988). Patient completed PT and followed with Ortho. Surg. at the time. Patient also treated with chiropractics. Once patient found his current chiropractor his pain improved by 90%.  "  Any previous MRI or X-rays: Yes, years ago  Sees a specialist for back pain: No  Alleviating factors:   Improved by: Laying down, chiropractor  Precipitating factors:  Worsened by: Bending, sitting  Therapies tried and outcome: Chiropractor, heat, stretching    Accompanying Signs & Symptoms:  Risk of Fracture: None  Risk of Cauda Equina: None  Risk of Infection: None  Risk of Cancer: None  Risk of Ankylosing Spondylitis: Onset at age <35, male, AND morning back stiffness  no     Per chart review, patient followed by rheumatology for seropositive rheumatoid arthritis of multiple sites with last evaluation being February 15, 2021.  Patient's symptoms treated with leflunomide.      Objective    /72   Pulse 60   Temp 97.9  F (36.6  C) (Tympanic)   Resp 16   Ht 1.803 m (5' 11\")   Wt 73.9 kg (163 lb)   SpO2 98%   BMI 22.73 kg/m    Body mass index is 22.73 kg/m .  Physical Exam  Vitals signs and nursing note reviewed.   Constitutional:       General: He is not in acute distress.     Appearance: Normal appearance.   HENT:      Head: Normocephalic and atraumatic.   Eyes:      Extraocular Movements: Extraocular movements intact.      Pupils: Pupils are equal, round, and reactive to light.   Neck:      Musculoskeletal: Normal range of motion.   Cardiovascular:      Rate and Rhythm: Normal rate and regular rhythm.      Heart sounds: Normal heart sounds.   Pulmonary:      Effort: Pulmonary effort is normal.      Breath sounds: Normal breath sounds.   Musculoskeletal: Normal range of motion.      Comments: No tenderness palpation of lumbar spine.  Sensation intact and equal to bilateral lower extremities.  Positive straight leg raise on left.  Negative straight leg raise on right.  Patient able to ambulate.   Skin:     General: Skin is warm and dry.   Neurological:      General: No focal deficit present.      Mental Status: He is alert.   Psychiatric:         Mood and Affect: Mood normal.         Behavior: Behavior " normal.

## 2021-05-24 ENCOUNTER — RECORDS - HEALTHEAST (OUTPATIENT)
Dept: ADMINISTRATIVE | Facility: CLINIC | Age: 61
End: 2021-05-24

## 2021-05-24 NOTE — TELEPHONE ENCOUNTER
Patient returned the call and reports he does not need a sooner apt anymore. Patient ended up seeing a chiropractor for his back pain. Patient also saw Dr. De Leon who ordered a lumbar MRI patient has scheduled for 5/27/21. Patient also has PT appointments scheduled for 6/4/21. Patient does not have any questions.    Jona RUBALCAVA RN....5/24/2021 3:45 PM

## 2021-05-24 NOTE — TELEPHONE ENCOUNTER
2nd attempt to reach the patient was unsuccessful.  There was no answer and the vm box was full.    Jona Mclaughlin RN....5/24/2021 2:59 PM

## 2021-05-26 ENCOUNTER — OFFICE VISIT (OUTPATIENT)
Dept: OPTOMETRY | Facility: CLINIC | Age: 61
End: 2021-05-26
Payer: COMMERCIAL

## 2021-05-26 DIAGNOSIS — Z01.01 ENCOUNTER FOR EXAMINATION OF EYES AND VISION WITH ABNORMAL FINDINGS: Primary | ICD-10-CM

## 2021-05-26 DIAGNOSIS — H52.223 REGULAR ASTIGMATISM OF BOTH EYES: ICD-10-CM

## 2021-05-26 DIAGNOSIS — H25.13 NUCLEAR AGE-RELATED CATARACT, BOTH EYES: ICD-10-CM

## 2021-05-26 DIAGNOSIS — H52.4 PRESBYOPIA: ICD-10-CM

## 2021-05-26 DIAGNOSIS — H52.03 HYPERMETROPIA, BILATERAL: ICD-10-CM

## 2021-05-26 PROCEDURE — 92004 COMPRE OPH EXAM NEW PT 1/>: CPT | Performed by: OPTOMETRIST

## 2021-05-26 PROCEDURE — 92015 DETERMINE REFRACTIVE STATE: CPT | Performed by: OPTOMETRIST

## 2021-05-26 ASSESSMENT — VISUAL ACUITY
CORRECTION_TYPE: GLASSES
OD_CC: J5
OD_CC+: -2
OD_CC: 20/20
OS_CC: J3
OS_CC+: -2
OS_CC: 20/20
METHOD: SNELLEN - LINEAR

## 2021-05-26 ASSESSMENT — REFRACTION_WEARINGRX
OS_SPHERE: +1.25
OD_ADD: +2.25
SPECS_TYPE: TRIFOCAL
OS_CYLINDER: SPHERE
OD_CYLINDER: SPHERE
OS_ADD: +2.25
OD_SPHERE: +1.25

## 2021-05-26 ASSESSMENT — REFRACTION_MANIFEST
OS_CYLINDER: +0.75
OS_AXIS: 012
OD_CYLINDER: +0.75
OS_SPHERE: +1.75
OS_ADD: +2.50
OD_SPHERE: +1.50
OD_AXIS: 011
OD_ADD: +2.50

## 2021-05-26 ASSESSMENT — TONOMETRY
OS_IOP_MMHG: 16
OD_IOP_MMHG: 16
IOP_METHOD: APPLANATION

## 2021-05-26 ASSESSMENT — CONF VISUAL FIELD
OD_NORMAL: 1
OS_NORMAL: 1

## 2021-05-26 ASSESSMENT — SLIT LAMP EXAM - LIDS
COMMENTS: NORMAL
COMMENTS: NORMAL

## 2021-05-26 ASSESSMENT — CUP TO DISC RATIO
OD_RATIO: 0.35
OS_RATIO: 0.25

## 2021-05-26 ASSESSMENT — EXTERNAL EXAM - LEFT EYE: OS_EXAM: NORMAL

## 2021-05-26 ASSESSMENT — EXTERNAL EXAM - RIGHT EYE: OD_EXAM: NORMAL

## 2021-05-26 NOTE — PROGRESS NOTES
"Chief Complaint   Patient presents with     Annual Eye Exam        Last Eye Exam: 5 years ago at a [facility]  Dilated Previously: Yes    What are you currently using to see?  Glasses, although he like to wear his +2.00 OTC readers.       Distance Vision Acuity: Noticed gradual change in both eyes    Near Vision Acuity: Not satisfied     Eye Comfort: good  Do you use eye drops? : No  Occupation or Hobbies: * for Dept of "[employer]    Medical, surgical and family histories reviewed and updated 5/26/2021.       OBJECTIVE: See Ophthalmology exam    ASSESSMENT:    ICD-10-CM    1. Encounter for examination of eyes and vision with abnormal findings  Z01.01 EYE EXAM (SIMPLE-NONBILLABLE)   2. Nuclear age-related cataract, both eyes  H25.13 EYE EXAM (SIMPLE-NONBILLABLE)   3. Hypermetropia, bilateral  H52.03 REFRACTIVE STATUS     EYE EXAM (SIMPLE-NONBILLABLE)   4. Regular astigmatism of both eyes  H52.223 REFRACTIVE STATUS     EYE EXAM (SIMPLE-NONBILLABLE)   5. Presbyopia  H52.4 REFRACTIVE STATUS     EYE EXAM (SIMPLE-NONBILLABLE)      PLAN:     Patient Instructions   You have the start of mild cataracts.  You may notice some blurred vision or glare with night driving.  It is important that you wear good sunglasses to protect your eyes from the ultraviolet light from the sun.     Bora was advised of today's exam findings.  Fill glasses prescription  Allow 2 weeks to adapt to change in glasses  Wear glasses full time  Copy of glasses Rx provided today.    Return in 1 year for eye exam, or sooner if needed.    The effects of the dilating drops last for 4- 6 hours.  You will be more sensitive to light and vision will be blurry up close.  Mydriatic sunglasses were given if needed.    Jay Vargas O.D.  [facility]  [address] [place] [address]. [place]  [address] [phone]       "

## 2021-05-26 NOTE — PATIENT INSTRUCTIONS
You have the start of mild cataracts.  You may notice some blurred vision or glare with night driving.  It is important that you wear good sunglasses to protect your eyes from the ultraviolet light from the sun.     Bora was advised of today's exam findings.  Fill glasses prescription  Allow 2 weeks to adapt to change in glasses  Wear glasses full time  Copy of glasses Rx provided today.    Return in 1 year for eye exam, or sooner if needed.    The effects of the dilating drops last for 4- 6 hours.  You will be more sensitive to light and vision will be blurry up close.  Mydriatic sunglasses were given if needed.    Jay Vargas O.D.  [facility]  [address] [place] [address]. [place]  [address]    [phone]

## 2021-05-26 NOTE — LETTER
"    5/26/2021         RE: Bora Monroy  [address]  [facility] [address]        Dear Colleague,    Thank you for referring your patient, Bora Monroy, to the [facility]. Please see a copy of my visit note below.    Chief Complaint   Patient presents with     Annual Eye Exam        Last Eye Exam: 5 years ago at a [facility]  Dilated Previously: Yes    What are you currently using to see?  Glasses, although he like to wear his +2.00 OTC readers.       Distance Vision Acuity: Noticed gradual change in both eyes    Near Vision Acuity: Not satisfied     Eye Comfort: good  Do you use eye drops? : No  Occupation or Hobbies: * for Dept of "[employer]    Medical, surgical and family histories reviewed and updated 5/26/2021.       OBJECTIVE: See Ophthalmology exam    ASSESSMENT:    ICD-10-CM    1. Encounter for examination of eyes and vision with abnormal findings  Z01.01 EYE EXAM (SIMPLE-NONBILLABLE)   2. Nuclear age-related cataract, both eyes  H25.13 EYE EXAM (SIMPLE-NONBILLABLE)   3. Hypermetropia, bilateral  H52.03 REFRACTIVE STATUS     EYE EXAM (SIMPLE-NONBILLABLE)   4. Regular astigmatism of both eyes  H52.223 REFRACTIVE STATUS     EYE EXAM (SIMPLE-NONBILLABLE)   5. Presbyopia  H52.4 REFRACTIVE STATUS     EYE EXAM (SIMPLE-NONBILLABLE)      PLAN:     Patient Instructions   You have the start of mild cataracts.  You may notice some blurred vision or glare with night driving.  It is important that you wear good sunglasses to protect your eyes from the ultraviolet light from the sun.     Bora was advised of today's exam findings.  Fill glasses prescription  Allow 2 weeks to adapt to change in glasses  Wear glasses full time  Copy of glasses Rx provided today.    Return in 1 year for eye exam, or sooner if needed.    The effects of the dilating drops last for 4- 6 hours.  You will be more sensitive to light and vision will be blurry up close.  " Mydriatic sunglasses were given if needed.    Jay Vargas O.D.  [address]. [place]  [address]    [phone]           Again, thank you for allowing me to participate in the care of your patient.        Sincerely,        Jay Vargas OD

## 2021-05-27 ENCOUNTER — ANCILLARY PROCEDURE (OUTPATIENT)
Dept: MRI IMAGING | Facility: CLINIC | Age: 61
End: 2021-05-27
Attending: PHYSICIAN ASSISTANT
Payer: COMMERCIAL

## 2021-05-27 DIAGNOSIS — G89.29 CHRONIC LEFT-SIDED LOW BACK PAIN WITH LEFT-SIDED SCIATICA: ICD-10-CM

## 2021-05-27 DIAGNOSIS — M54.42 CHRONIC LEFT-SIDED LOW BACK PAIN WITH LEFT-SIDED SCIATICA: ICD-10-CM

## 2021-05-27 PROCEDURE — 72148 MRI LUMBAR SPINE W/O DYE: CPT | Mod: TC | Performed by: RADIOLOGY

## 2021-06-04 ENCOUNTER — THERAPY VISIT (OUTPATIENT)
Dept: PHYSICAL THERAPY | Facility: CLINIC | Age: 61
End: 2021-06-04
Attending: PHYSICIAN ASSISTANT
Payer: COMMERCIAL

## 2021-06-04 DIAGNOSIS — G89.29 CHRONIC LEFT-SIDED LOW BACK PAIN WITH LEFT-SIDED SCIATICA: ICD-10-CM

## 2021-06-04 DIAGNOSIS — M54.42 CHRONIC LEFT-SIDED LOW BACK PAIN WITH LEFT-SIDED SCIATICA: ICD-10-CM

## 2021-06-04 DIAGNOSIS — M54.42 ACUTE LEFT-SIDED LOW BACK PAIN WITH LEFT-SIDED SCIATICA: ICD-10-CM

## 2021-06-04 PROCEDURE — 97161 PT EVAL LOW COMPLEX 20 MIN: CPT | Mod: GP | Performed by: PHYSICAL THERAPIST

## 2021-06-04 PROCEDURE — 97110 THERAPEUTIC EXERCISES: CPT | Mod: GP | Performed by: PHYSICAL THERAPIST

## 2021-06-04 NOTE — PROGRESS NOTES
Physical Therapy Initial Evaluation  Subjective:  The history is provided by the patient.   Patient Health History  Bora Monroy being seen for LBP.     Date of Onset: 6 weeks.   Problem occurred: started with stiffness in L lower lumbar   Pain is reported as 3/10 on pain scale.  General health as reported by patient is good.  Pertinent medical history includes: high blood pressure and rheumatoid arthritis.   Red flags:  None as reported by patient.  Medical allergies: none.   Surgeries include:  None.    Current medications:  High blood pressure medication (RA meds).    Current occupation is [place] [employer].   Primary job tasks include:  Computer work, lifting/carrying and pushing/pulling.                  Therapist Generated HPI Evaluation  Problem details: 6 weeks stared to get some stiffness in L LB and sciatic area.  .         Type of problem:  Lumbar.    This is a chronic condition.  Condition occurred with:  Insidious onset.  Where condition occurred: for unknown reasons.  Patient reports pain:  Lumbar spine left, lower lumbar spine and mid lumbar spine.  Pain is described as aching and is intermittent.  Pain radiates to:  Knee left, gluteals left, thigh left and lower leg left. Pain is worse during the day.  Since onset symptoms are gradually improving.  Associated symptoms:  Loss of motion/stiffness and loss of motion. Symptoms are exacerbated by sitting and standing  and relieved by activity/movement (walking).  Special tests included:  MRI (see report in chart - multilevel disc degen with protrusion).  Previous treatment includes chiropractic (cardiovascular work and stretching). Improved with treatment: chiro not helping as much this time.  Restrictions due to condition include:  Working in normal job without restrictions.                          Objective:  Standing Alignment:        Lumbar:  Normal                           Lumbar/SI Evaluation  ROM:    AROM Lumbar:   Flexion:          Normal   Ext:                     Normal with slight pain   Side Bend:        Left:  Normal with pain    Right:  Normal   Rotation:           Left:     Right:   Side Glide:        Left:     Right:           Lumbar Myotomes:  normal            Lumbar DTR's:  normal        Lumbar Dermtomes:  normal                Neural Tension/Mobility:  Neural tension wnl lumbar: HS tighter on L due to old injury.      Left side:SLR or Slump  negative.     Right side:   Slump or SLR  negative.   Lumbar Palpation:  Palpation (lumbar): denies tenderness.          Spinal Segmental Conclusions:     Level: Hypo noted at L2, L3, L5, S1 and L4                                                   General     ROS    Assessment/Plan:    Patient is a 61 year old male with lumbar complaints.    Patient has the following significant findings with corresponding treatment plan.                Diagnosis 1:  L LBP  Pain -  manual therapy, self management, education, directional preference exercise and home program  Decreased ROM/flexibility - manual therapy and therapeutic exercise  Decreased joint mobility - manual therapy and therapeutic exercise  Impaired muscle performance - neuro re-education  Decreased function - therapeutic activities    Therapy Evaluation Codes:   1) History comprised of:   Personal factors that impact the plan of care:      None.    Comorbidity factors that impact the plan of care are:      High blood pressure and Rheumatoid arthritis.     Medications impacting care: High blood pressure.  2) Examination of Body Systems comprised of:   Body structures and functions that impact the plan of care:      Lumbar spine.   Activity limitations that impact the plan of care are:      Sitting and Standing.  3) Clinical presentation characteristics are:   Stable/Uncomplicated.  4) Decision-Making    Low complexity using standardized patient assessment instrument and/or measureable assessment of functional outcome.  Cumulative Therapy Evaluation is: Low  complexity.    Previous and current functional limitations:  (See Goal Flow Sheet for this information)    Short term and Long term goals: (See Goal Flow Sheet for this information)     Communication ability:  Patient appears to be able to clearly communicate and understand verbal and written communication and follow directions correctly.  Treatment Explanation - The following has been discussed with the patient:   RX ordered/plan of care  Anticipated outcomes  Possible risks and side effects  This patient would benefit from PT intervention to resume normal activities.   Rehab potential is good.    Frequency:  1 X week, once daily  Duration:  for 8 weeks  Discharge Plan:  Achieve all LTG.  Independent in home treatment program.  Reach maximal therapeutic benefit.    Please refer to the daily flowsheet for treatment today, total treatment time and time spent performing 1:1 timed codes.

## 2021-06-08 ENCOUNTER — THERAPY VISIT (OUTPATIENT)
Dept: PHYSICAL THERAPY | Facility: CLINIC | Age: 61
End: 2021-06-08
Payer: COMMERCIAL

## 2021-06-08 DIAGNOSIS — M54.42 ACUTE LEFT-SIDED LOW BACK PAIN WITH LEFT-SIDED SCIATICA: ICD-10-CM

## 2021-06-08 PROCEDURE — 97530 THERAPEUTIC ACTIVITIES: CPT | Mod: GP | Performed by: PHYSICAL THERAPIST

## 2021-06-08 PROCEDURE — 97110 THERAPEUTIC EXERCISES: CPT | Mod: GP | Performed by: PHYSICAL THERAPIST

## 2021-06-28 DIAGNOSIS — M05.79 SEROPOSITIVE RHEUMATOID ARTHRITIS OF MULTIPLE SITES (H): ICD-10-CM

## 2021-06-29 ENCOUNTER — THERAPY VISIT (OUTPATIENT)
Dept: PHYSICAL THERAPY | Facility: CLINIC | Age: 61
End: 2021-06-29
Payer: COMMERCIAL

## 2021-06-29 DIAGNOSIS — M54.42 ACUTE LEFT-SIDED LOW BACK PAIN WITH LEFT-SIDED SCIATICA: ICD-10-CM

## 2021-06-29 PROCEDURE — 97140 MANUAL THERAPY 1/> REGIONS: CPT | Mod: GP | Performed by: PHYSICAL THERAPIST

## 2021-06-29 PROCEDURE — 97110 THERAPEUTIC EXERCISES: CPT | Mod: GP | Performed by: PHYSICAL THERAPIST

## 2021-07-02 RX ORDER — LEFLUNOMIDE 20 MG/1
20 TABLET ORAL EVERY OTHER DAY
Qty: 45 TABLET | Refills: 0 | Status: SHIPPED | OUTPATIENT
Start: 2021-07-02 | End: 2021-08-16

## 2021-07-20 ENCOUNTER — THERAPY VISIT (OUTPATIENT)
Dept: PHYSICAL THERAPY | Facility: CLINIC | Age: 61
End: 2021-07-20
Payer: COMMERCIAL

## 2021-07-20 DIAGNOSIS — M54.42 ACUTE LEFT-SIDED LOW BACK PAIN WITH LEFT-SIDED SCIATICA: ICD-10-CM

## 2021-07-20 PROCEDURE — 97110 THERAPEUTIC EXERCISES: CPT | Mod: GP | Performed by: PHYSICAL THERAPIST

## 2021-07-20 PROCEDURE — 97112 NEUROMUSCULAR REEDUCATION: CPT | Mod: GP | Performed by: PHYSICAL THERAPIST

## 2021-07-20 PROCEDURE — 97530 THERAPEUTIC ACTIVITIES: CPT | Mod: GP | Performed by: PHYSICAL THERAPIST

## 2021-08-10 ENCOUNTER — LAB (OUTPATIENT)
Dept: LAB | Facility: CLINIC | Age: 61
End: 2021-08-10
Payer: COMMERCIAL

## 2021-08-10 DIAGNOSIS — M05.79 SEROPOSITIVE RHEUMATOID ARTHRITIS OF MULTIPLE SITES (H): ICD-10-CM

## 2021-08-10 DIAGNOSIS — Z79.899 HIGH RISK MEDICATION USE: ICD-10-CM

## 2021-08-10 LAB
ALBUMIN SERPL-MCNC: 4.3 G/DL (ref 3.4–5)
ALP SERPL-CCNC: 77 U/L (ref 40–150)
ALT SERPL W P-5'-P-CCNC: 31 U/L (ref 0–70)
AST SERPL W P-5'-P-CCNC: 20 U/L (ref 0–45)
BASOPHILS # BLD AUTO: 0 10E3/UL (ref 0–0.2)
BASOPHILS NFR BLD AUTO: 0 %
BILIRUB DIRECT SERPL-MCNC: <0.1 MG/DL (ref 0–0.2)
BILIRUB SERPL-MCNC: 0.5 MG/DL (ref 0.2–1.3)
CREAT SERPL-MCNC: 1.09 MG/DL (ref 0.66–1.25)
CRP SERPL-MCNC: <2.9 MG/L (ref 0–8)
EOSINOPHIL # BLD AUTO: 0.1 10E3/UL (ref 0–0.7)
EOSINOPHIL NFR BLD AUTO: 1 %
ERYTHROCYTE [DISTWIDTH] IN BLOOD BY AUTOMATED COUNT: 11.6 % (ref 10–15)
ERYTHROCYTE [SEDIMENTATION RATE] IN BLOOD BY WESTERGREN METHOD: 4 MM/HR (ref 0–20)
GFR SERPL CREATININE-BSD FRML MDRD: 73 ML/MIN/1.73M2
HCT VFR BLD AUTO: 40 % (ref 40–53)
HGB BLD-MCNC: 13.7 G/DL (ref 13.3–17.7)
LYMPHOCYTES # BLD AUTO: 1.7 10E3/UL (ref 0.8–5.3)
LYMPHOCYTES NFR BLD AUTO: 30 %
MCH RBC QN AUTO: 33.4 PG (ref 26.5–33)
MCHC RBC AUTO-ENTMCNC: 34.3 G/DL (ref 31.5–36.5)
MCV RBC AUTO: 98 FL (ref 78–100)
MONOCYTES # BLD AUTO: 0.4 10E3/UL (ref 0–1.3)
MONOCYTES NFR BLD AUTO: 8 %
NEUTROPHILS # BLD AUTO: 3.4 10E3/UL (ref 1.6–8.3)
NEUTROPHILS NFR BLD AUTO: 61 %
PLATELET # BLD AUTO: 229 10E3/UL (ref 150–450)
PROT SERPL-MCNC: 7.2 G/DL (ref 6.8–8.8)
RBC # BLD AUTO: 4.1 10E6/UL (ref 4.4–5.9)
WBC # BLD AUTO: 5.5 10E3/UL (ref 4–11)

## 2021-08-10 PROCEDURE — 85652 RBC SED RATE AUTOMATED: CPT

## 2021-08-10 PROCEDURE — 36415 COLL VENOUS BLD VENIPUNCTURE: CPT

## 2021-08-10 PROCEDURE — 80076 HEPATIC FUNCTION PANEL: CPT

## 2021-08-10 PROCEDURE — 82565 ASSAY OF CREATININE: CPT

## 2021-08-10 PROCEDURE — 85025 COMPLETE CBC W/AUTO DIFF WBC: CPT

## 2021-08-10 PROCEDURE — 86140 C-REACTIVE PROTEIN: CPT

## 2021-08-16 ENCOUNTER — OFFICE VISIT (OUTPATIENT)
Dept: RHEUMATOLOGY | Facility: CLINIC | Age: 61
End: 2021-08-16
Payer: COMMERCIAL

## 2021-08-16 VITALS
OXYGEN SATURATION: 98 % | WEIGHT: 165.4 LBS | SYSTOLIC BLOOD PRESSURE: 124 MMHG | DIASTOLIC BLOOD PRESSURE: 71 MMHG | HEART RATE: 66 BPM | HEIGHT: 71 IN | BODY MASS INDEX: 23.15 KG/M2

## 2021-08-16 DIAGNOSIS — Z79.899 HIGH RISK MEDICATION USE: ICD-10-CM

## 2021-08-16 DIAGNOSIS — M05.79 SEROPOSITIVE RHEUMATOID ARTHRITIS OF MULTIPLE SITES (H): Primary | ICD-10-CM

## 2021-08-16 PROCEDURE — 99214 OFFICE O/P EST MOD 30 MIN: CPT | Performed by: INTERNAL MEDICINE

## 2021-08-16 RX ORDER — LEFLUNOMIDE 20 MG/1
20 TABLET ORAL EVERY OTHER DAY
Qty: 45 TABLET | Refills: 0 | Status: SHIPPED | OUTPATIENT
Start: 2021-08-16 | End: 2022-01-13

## 2021-08-16 ASSESSMENT — MIFFLIN-ST. JEOR: SCORE: 1577.38

## 2021-08-16 NOTE — PROGRESS NOTES
Rheumatology Clinic Visit      Bora Monroy MRN# [id number]   YOB: 1960 Age: 61 year old      Date of visit: 8/16/21   PCP: Dr. Yakov Jones     Chief Complaint     Patient presents with:  Arthritis: RA, please go over labs    Assessment and Plan     1. Seropositive rheumatoid arthritis (RF negative, CCP positive): Currently on leflunomide 20 mg every other day; he has not required prednisone for approximately 2 years, per patient.  Doing well at this time with regard to rheumatoid arthritis.  Chronic illness, stable.    - Continue leflunomide 20 mg every other day  - Labs today and every 8-12 weeks:  CBC, Creatinine, Hepatic Panel, ESR, CRP     High risk medication requiring intensive toxicity monitoring at least quarterly: labs ordered include CBC, Creatinine, Hepatic panel to monitor for cytopenia and hepatotoxicity; checking creatinine as it affects clearance of medication.     2. COVID19 pandemic: spent time discussing risks of going to a family gathering where he says that he would need to stay in a hotel and go out to eat. Advised social distancing, wearing a mask.  He will take this into consideration.     - COVID-19: has received the Pfizer COVID-19 vaccine on 3/16/21 and 4/6/2021; advised receiving a 3rd booster dose with either the Pfizer or Moderna COVID19 vaccine     Based on our current understanding (and this may change over time as we learn more), the following adjustments are recommended around the time of COVID-19 vaccination:  - NSAIDs and Acetaminophen: hold for 24 hours prior to vaccination if able to do so    Total minutes spent in evaluation with patient, documentation, , and review of pertinent studies and chart notes: 20      Mr. Monroy verbalized agreement with and understanding of the rational for the diagnosis and treatment plan.  All questions were answered to best of my ability and the patient's satisfaction. Mr. Monroy was advised to contact the clinic  with any questions that may arise after the clinic visit.      Thank you for involving me in the care of the patient    Return to clinic: 6 months (pt preference)    HPI   Bora Monroy is a 61 year old male hyperlipidemia, and seropositive rheumatoid arthritis who is here for follow-up of RA.     Today, 8/16/2021: doing well at this time. No joint pain/swelling or stiffness. Has questions about the COVID19 booster vaccine. Contemplating risks associated with going to a family gathering in [place].     Denies fevers, chills, nausea, vomiting, constipation, diarrhea. No abdominal pain. No chest pain/pressure, palpitations, or shortness of breath. No oral or nasal sores. No neck pain. No rash. No LE swelling.      ROS   12 point review of system was completed and negative except as noted in the HPI     Active Problem List     Patient Active Problem List   Diagnosis     Presbyopias     Hyperopia     Hyperlipidemia with target LDL less than 130     Impaired fasting glucose     High risk medications (not anticoagulants) long-term use     Advanced directives, counseling/discussion     Seropositive rheumatoid arthritis of multiple sites (HCC)     Hypertension goal BP (blood pressure) < 140/90     Kidney stone     Acute left-sided low back pain with left-sided sciatica     Past Medical History     Past Medical History:   Diagnosis Date     Hyperlipidemia LDL goal < 130     declines statins -- favorable CAC in 2/18     Hypertension goal BP (blood pressure) < 140/90 11/17/2015    NL stress test 4/1/16, med started 11/16     Impaired fasting glucose      Kidney stone 12/8/2016     Seropositive rheumatoid arthritis of multiple sites (H) 11/17/2015     Past Surgical History     Past Surgical History:   Procedure Laterality Date     COLONOSCOPY  11/5/2010    COLONOSCOPY performed by FERMIN MCCLELLAND at [facility]     COLONOSCOPY N/A 1/25/2021    Procedure: COLONOSCOPY;  Surgeon: Anderson Heredia MD;  Location: [facility]      "TONSILLECTOMY  1964    AGE 4     Allergy     Allergies   Allergen Reactions     Azithromycin Diarrhea     Current Medication List     Current Outpatient Medications   Medication Sig     amLODIPine-benazepril (LOTREL) 5-20 MG capsule Take 1 capsule by mouth daily     Cholecalciferol (VITAMIN D PO) Take 1,000 Units by mouth     cinnamon 500 MG TABS Take 1 tablet by mouth daily      FISH OIL 1000 MG OR CAPS 1 tablet daily     glucosamine-chondroitin 500-400 MG CAPS per capsule Take 1 capsule by mouth daily     KRILL OIL PO Take 1 tablet by mouth daily     leflunomide (ARAVA) 20 MG tablet Take 1 tablet (20 mg) by mouth every other day .  Labs required every 8-12 weeks     Misc Natural Products (TURMERIC CURCUMIN) CAPS Take by mouth daily     MULTI-VITAMIN OR TABS 1 TABLET DAILY     psyllium (METAMUCIL) 30.9 % POWD Take 1 tsp by mouth 2 times daily.     triamcinolone (KENALOG) 0.1 % external cream Apply sparingly to affected area two to three times daily as needed for hand rash     VITAMIN C OR None Entered     VITAMIN E PO      No current facility-administered medications for this visit.     Social History     See HPI    Family History     Family History   Problem Relation Age of Onset     Lipids Sister      Diabetes Sister      Lipids Father      Hypertension Father      Cerebrovascular Disease Maternal Aunt      Cancer No family hx of        Physical Exam     Temp Readings from Last 3 Encounters:   05/20/21 97.9  F (36.6  C) (Tympanic)   01/25/21 97.5  F (36.4  C) (Oral)   01/21/21 97.2  F (36.2  C) (Tympanic)     BP Readings from Last 5 Encounters:   08/16/21 124/71   05/20/21 120/72   01/25/21 102/76   01/21/21 134/72   12/15/20 (!) 144/78     Pulse Readings from Last 1 Encounters:   08/16/21 66     Resp Readings from Last 1 Encounters:   05/20/21 16     Estimated body mass index is 23.07 kg/m  as calculated from the following:    Height as of this encounter: 1.803 m (5' 11\").    Weight as of this encounter: 75 kg " (165 lb 6.4 oz).      GEN: NAD. Healthy appearing adult.   HEENT:  Anicteric, noninjected sclera. No obvious external lesions of the ear and nose. Hearing intact.  CV: S1, S2. RRR. No m/r/g  PULM: No increased work of breathing. CTA bilaterally   MSK: MCPs, PIPs, DIPs without swelling or tenderness to palpation.  Wrists without swelling or tenderness to palpation.  Elbows and shoulders without swelling or tenderness to palpation.  Knees, ankles, and MTPs without swelling or tenderness to palpation.    SKIN: No rash or jaundice seen  PSYCH: Alert. Appropriate.        Labs     CBC  Recent Labs   Lab Test 08/10/21  1457 03/22/21  1454 01/21/21  1537 11/30/20  0844 08/19/20  1513   WBC 5.5 5.3 4.8 4.3 5.3   RBC 4.10* 3.91* 4.33* 4.57 4.37*   HGB 13.7 13.1* 14.3 15.0 14.4   HCT 40.0 37.9* 40.8 42.7 40.8   MCV 98 97 94 93 93   RDW 11.6 11.6 11.5 11.7 11.6    227 194 192 205   MCH 33.4* 33.5* 33.0 32.8 33.0   MCHC 34.3 34.6 35.0 35.1 35.3   NEUTROPHIL 61 59.3  --  55.2 57.6   LYMPH 30 29.9  --  34.3 32.3   MONOCYTE 8 9.3  --  8.8 8.8   EOSINOPHIL 1 1.1  --  1.2 0.9   BASOPHIL 0 0.4  --  0.5 0.4   ANEU  --  3.1  --  2.4 3.1   ALYM  --  1.6  --  1.5 1.7   MANDI  --  0.5  --  0.4 0.5   AEOS  --  0.1  --  0.1 0.1   ABAS  --  0.0  --  0.0 0.0   ANEUTAUTO 3.4  --   --   --   --    ALYMPAUTO 1.7  --   --   --   --    AMONOAUTO 0.4  --   --   --   --    AEOSAUTO 0.1  --   --   --   --    ABSBASO 0.0  --   --   --   --      CMP  Recent Labs   Lab Test 08/10/21  1457 03/22/21  1454 01/21/21  1537 11/30/20  0844 11/19/19  0941   NA  --   --  136 138 137   POTASSIUM  --   --  4.4 3.9 3.9   CHLORIDE  --   --  102 104 102   CO2  --   --  30 29 28   ANIONGAP  --   --  4 5 7   GLC  --   --  98 108* 102*   BUN  --   --  12 12 9   CR 1.09 1.02 0.86 0.80  0.83 0.81   GFRESTIMATED 73 79 >90 >90  >90 >90   GFRESTBLACK  --  >90 >90 >90  >90 >90   MOLINA  --   --  9.1 8.8 9.0   BILITOTAL 0.5 0.6 0.7 0.5 0.7   ALBUMIN 4.3 4.3 4.4 4.2 4.1    PROTTOTAL 7.2 6.8 7.2 7.0 6.9   ALKPHOS 77 77 85 74 82   AST 20 12 23 17 16   ALT 31 25 32 32 27     Calcium/VitaminD  Recent Labs   Lab Test 01/21/21  1537 11/30/20  0844 11/19/19  0941 10/12/15  1439   MOLINA 9.1 8.8 9.0  --    VITDT  --   --   --  46     ESR/CRP  Recent Labs   Lab Test 08/10/21  1457 03/22/21  1454 11/30/20  0844   SED 4 4 3   CRP <2.9 <2.9 <2.9       HIV Screening  Recent Labs   Lab Test 10/16/18  0757   HIAGAB Nonreactive     Immunization History     Immunization History   Administered Date(s) Administered     COVID-19,PF,Pfizer 03/16/2021, 04/06/2021     Influenza (IIV3) PF 10/29/2007, 10/13/2012, 10/15/2013     Influenza Vaccine IM > 6 months Valent IIV4 10/30/2014, 10/29/2015, 10/15/2016, 10/13/2017, 10/15/2018, 10/15/2019     Pneumococcal 23 valent 10/31/2002     TD (ADULT, 7+) 09/03/2004     TDAP Vaccine (Boostrix) 11/13/2014          Chart documentation done in part with Dragon Voice recognition Software. Although reviewed after completion, some word and grammatical error may remain.      Eduard Mazariegos MD

## 2021-09-24 ENCOUNTER — OFFICE VISIT (OUTPATIENT)
Dept: FAMILY MEDICINE | Facility: CLINIC | Age: 61
End: 2021-09-24
Payer: COMMERCIAL

## 2021-09-24 VITALS
RESPIRATION RATE: 14 BRPM | DIASTOLIC BLOOD PRESSURE: 71 MMHG | WEIGHT: 165.8 LBS | HEART RATE: 68 BPM | TEMPERATURE: 98.4 F | BODY MASS INDEX: 23.12 KG/M2 | SYSTOLIC BLOOD PRESSURE: 117 MMHG | OXYGEN SATURATION: 97 %

## 2021-09-24 DIAGNOSIS — Z23 ENCOUNTER FOR ADMINISTRATION OF VACCINE: ICD-10-CM

## 2021-09-24 DIAGNOSIS — M54.42 ACUTE LEFT-SIDED LOW BACK PAIN WITH LEFT-SIDED SCIATICA: Primary | ICD-10-CM

## 2021-09-24 PROCEDURE — 90471 IMMUNIZATION ADMIN: CPT | Performed by: PHYSICIAN ASSISTANT

## 2021-09-24 PROCEDURE — 99213 OFFICE O/P EST LOW 20 MIN: CPT | Mod: 25 | Performed by: PHYSICIAN ASSISTANT

## 2021-09-24 PROCEDURE — 90682 RIV4 VACC RECOMBINANT DNA IM: CPT | Performed by: PHYSICIAN ASSISTANT

## 2021-09-24 RX ORDER — PREDNISONE 20 MG/1
40 TABLET ORAL DAILY
Qty: 10 TABLET | Refills: 0 | Status: SHIPPED | OUTPATIENT
Start: 2021-09-24 | End: 2021-09-29

## 2021-09-24 ASSESSMENT — ENCOUNTER SYMPTOMS
PARESTHESIAS: 0
BACK PAIN: 1
FEVER: 0
NERVOUS/ANXIOUS: 0
SHORTNESS OF BREATH: 0
NUMBNESS: 0
WEAKNESS: 0

## 2021-09-24 ASSESSMENT — PAIN SCALES - GENERAL: PAINLEVEL: MODERATE PAIN (5)

## 2021-09-24 NOTE — PATIENT INSTRUCTIONS
For further treatment of your back pain, placed a referral to the spine doctor.  The scheduling team will call you to arrange this visit.  Additionally, I prescribed a short burst of prednisone, a steroid.  Return to clinic for new or worsening symptoms.  Continue with your physical therapy exercises as prescribed.    You have also received your flu shot today.    You do qualify for your third dose of the COVID-19 vaccine due to the immune modulating medication you are taking.  Please go to the [facility] pharmacy across the [place] from clinic for your vaccine.    Please reach out with questions or concerns.      Patient Education     Back Care Tips     Caring for your back  These are things you can do to prevent a recurrence of acute back pain and to reduce symptoms from chronic back pain:    Stay at a healthy weight. If you are overweight, losing weight will help most types of back pain.    Exercise is an important part of recovery from most types of back pain. The muscles behind and in front of the spine support the back. This means strengthening both the back muscles and the abdominal muscles will provide better support for your spine.     Swimming and brisk walking are good overall exercises to improve your fitness level.    Practice safe lifting methods (see below).    Practice good posture when sitting, standing, and walking. Don't sit for a long time. This puts more stress on the lower back than standing or walking.    Wear quality shoes with good arch support. Foot and ankle alignment can affect back symptoms. Don't wear high heels.    Therapeutic massage can help relax the back muscles without stretching them.    During the first 24 to 72 hours after an acute injury or flare-up of chronic back pain, put an ice pack on the painful area for 20 minutes and then remove it for 20 minutes. Do thisover a period of 60 to 90 minutes, or several times a day. As a safety precaution, don't use a heating pad at  bedtime. Sleeping on a heating pad can lead to skin burns or tissue damage.    You can alternate using ice and heat.  Medicines  Talk with your healthcare provider before using medicines, especially if you have other health problems or are taking other medicines.    You may use over-the-counter medicines, such as acetaminophen, ibuprofen, or naprosyn to control pain, unless your healthcare provider prescribed other pain medicine. Talk with your healthcare provider before taking any medicines if you have a chronic condition such as diabetes, liver or kidney disease, stomach ulcers, or digestive bleeding, or are taking blood thinners.    Be careful if you are given prescription pain medicines, opioids, or medicine for muscle spasm. They can cause drowsiness, and affect your coordination, reflexes, and judgment. Don't drive or operate heavy machinery while taking these types of medicines. Take prescription pain medicine only as prescribed by your healthcare provider.  Lumbar stretch  This simple stretch will help relax muscle spasm and keep your back more limber. If exercise makes your back pain worse, don t do it.    Lie on your back with your knees bent and both feet on the ground.    Slowly raise your left knee to your chest as you flatten your lower back against the floor. Hold for 5 seconds.    Relax and repeat the exercise with your right knee.    Do 10 of these exercises for each leg.  Safe lifting method    Don t bend over at the waist to lift an object off the floor.  Instead, bend your knees and hips in a squat.     Keep your back and head upright    Hold the object close to your body, directly in front of you.    Straighten your legs to lift the object.     Lower the object to the floor in the reverse fashion.    If you must slide something across the floor, push it.    Posture tips  Sitting  Sit in chairs with straight backs or low-back support. Keep your knees lower than your hips, with your feet flat on  the floor.  When driving, sit up straight. Adjust the seat forward so you are not leaning toward the steering wheel.  A small pillow or rolled towel behind your lower back may help if you are driving long distances.   Standing  When standing for long periods, shift most of your weight to one leg at a time. Switch legs every few minutes.   Sleeping  The best way to sleep is on your side with your knees bent. Put a low pillow under your head to support your neck in a neutral spine position. Don't use thick pillows that bend your neck to one side. Put a pillow between your legs to further relax your lower back. If you sleep on your back, put pillows under your knees to support your legs in a slightly flexed position. Use a firm mattress. If your mattress sags, replace it, or use a 1/2-inch plywood board under the mattress to add support.  Follow-up care  Follow up with your healthcare provider, or as advised.  If X-rays, a CT scan or an MRI scan were taken, they may be reviewed by a radiologist. You will be told of any new findings that may affect your care.  Call 911  Call 911 if any of the following occur:    Trouble breathing    Confusion    Very drowsy    Fainting or loss of consciousness    Rapid or very slow heart rate    Loss of  bowel or bladder control  When to seek medical advice  Call your healthcare provider right away if any of the following occur:    Pain becomes worse or spreads to your arms or legs    Weakness or numbness in one or both arms or legs    Numbness in the groin area  [employer] last reviewed this educational content on 11/1/2019 2000-2021 [employer]. All rights reserved. This information is not intended as a substitute for professional medical care. Always follow your healthcare professional's instructions.           Patient Education     Understanding Lumbar Radiculopathy    Lumbar radiculopathy is irritation or inflammation of a nerve root in the low back. It causes  symptoms that spread out from the back down one or both legs. To understand this condition, it helps to understand the parts of the spine:    Vertebrae. These are bones that stack to form the spine. The lumbar spine contains 5 vertebrae near the bottom of your spine.    Disks. These are soft pads of tissue between the vertebrae. They act as shock absorbers for the spine.    Spinal canal. This is a tunnel formed within the stacked vertebrae. In the lumbar spine, nerves run through this canal.    Nerves. These branch off and leave the spinal canal, traveling out to parts of the body. As they leave the spinal canal, nerves pass through openings between the vertebrae. The nerve root is the part of the nerve that is closest to the spinal canal.    Sciatic nerve. This is a large nerve formed from several nerve roots in the low back. This nerve extends down the back of the leg to the foot.  With lumbar radiculopathy, nerve roots in the low back become irritated. This leads to pain and symptoms. The sciatic nerve is commonly involved, so the condition is often called sciatica.  What causes lumbar radiculopathy?  Aging, injury, poor posture, extra body weight, and other issues can lead to problems in the low back. These problems may then irritate nerve roots. They include:    Damage to a disk in the lumbar spine. The damaged disk may then press on nearby nerve roots.    Degeneration from wear and tear, and aging. This can lead to narrowing (stenosis) of the openings between the vertebrae. The narrowed openings press on nerve roots as they leave the spinal canal.    Unstable spine. This is when a vertebra slips forward. It can then press on a nerve root.  Other, less common things can put pressure on nerves in the low back. These include diabetes, infection, or a tumor.  Symptoms of lumbar radiculopathy  These include:    Pain in the low back    Pain, numbness, tingling, or weakness that travels into the buttocks, hip, groin,  or leg    Muscle spasms in the low back, or leg  Treatment for lumbar radiculopathy  In most cases, your healthcare provider will first try treatments that help relieve symptoms. These may include:    Prescription and over-the-counter pain medicines. These help relieve pain, swelling, and irritation.    Limits on positions and activities that increase pain. But lying in bed or avoiding all movement is only recommended for a short period of time.    Physical therapy, including exercises and stretches. This helps decrease pain and increase movement and function.    Steroid shots into the lower back. This may help relieve symptoms for a time.    Weight-loss program. If you are overweight, losing extra pounds (kilograms) may help relieve symptoms.  In some cases, you may need surgery to fix the underlying problem. This depends on the cause, the symptoms, and how long the pain has lasted.  Possible complications  Over time, an irritated and inflamed nerve may become damaged. This may lead to long-lasting (permanent) numbness or weakness in your legs and feet. If symptoms change suddenly or get worse, be sure to let your healthcare provider know.  When to call your healthcare provider  Call your healthcare provider right away if you have any of these:    New pain or pain that gets worse    New or increasing weakness, tingling, or numbness in your leg or foot    Problems controlling your bladder or bowel  [employer] last reviewed this educational content on 2/1/2020 2000-2021 [employer]. All rights reserved. This information is not intended as a substitute for professional medical care. Always follow your healthcare professional's instructions.

## 2021-09-24 NOTE — PROGRESS NOTES
Assessment & Plan     Acute left-sided low back pain with left-sided sciatica  Patient is a 61-year-old male who presents to clinic for follow-up on low back pain.  Symptoms are improving with physical therapy, but worsened 9/18/21 without injury or trauma.  MRI does show degenerative changes as well as small central disc protrusion at L5-S1.  Given worsening symptoms, recommended follow-up with spine for further evaluation and possible injections.  Additionally, prescribed short steroid burst.  Recommended continue with physical therapy exercises, Tylenol/ibuprofen.  return precautions provided.  - Spine Referral; Future  - predniSONE (DELTASONE) 20 MG tablet; Take 2 tablets (40 mg) by mouth daily for 5 days    Encounter for administration of vaccine  Patient would like to proceed with influenza vaccination today.  Additionally, patient does meet criteria for third dose of Pfizer COVID-19 vaccine given immunocompromise status.  Influenza vaccination completed clinic.  Patient to proceed to pharmacy for COVID-19 vaccine.      See Patient Instructions    Return in about 2 weeks (around 10/8/2021), or if symptoms worsen or fail to improve.    Leonora De Leon PA-C  [facility] KWAKU Fernandez is a 61 year old who presents for the following health issues     HPI     Follow up for back pain. Went to therapy for 6 weeks which alleviated leg and groin pain. Pain in buttock persisted. Pain started again 9/18/21. Little leg pain. Left groin pain has increased. No loss of bowel/bladder function or saddle anesthesia.    Per chart review, last office visit with patient 5/20/2021 at which time referral for physical therapy was initiated.    MRI Lumbar Spine 5/27/21:  IMPRESSION:  1. Multilevel degenerative disc and facet disease most advanced at  L4-L5 where there is mild central canal stenosis and moderate  bilateral neural foraminal stenosis.  2. Small central disc protrusion at L5-S1 without any  significant  stenosis.    Review of Systems   Constitutional: Negative for fever.   HENT: Negative for congestion.    Respiratory: Negative for shortness of breath.    Cardiovascular: Negative for chest pain.   Musculoskeletal: Positive for back pain.   Skin: Negative for rash.   Neurological: Negative for weakness, numbness and paresthesias.   Psychiatric/Behavioral: The patient is not nervous/anxious.             Objective    /71   Pulse 68   Temp 98.4  F (36.9  C) (Tympanic)   Resp 14   Wt 75.2 kg (165 lb 12.8 oz)   SpO2 97%   BMI 23.12 kg/m    Body mass index is 23.12 kg/m .  Physical Exam  Vitals and nursing note reviewed.   Constitutional:       General: He is not in acute distress.     Appearance: Normal appearance.   HENT:      Head: Normocephalic and atraumatic.   Eyes:      Extraocular Movements: Extraocular movements intact.      Pupils: Pupils are equal, round, and reactive to light.   Cardiovascular:      Rate and Rhythm: Normal rate and regular rhythm.   Pulmonary:      Effort: Pulmonary effort is normal.   Musculoskeletal:         General: Normal range of motion.      Cervical back: Normal range of motion.      Comments: Tenderness palpation of left lumbar/sacral musculature.  No tenderness palpation of spine.  Normal gait.  Positive straight leg raise bilaterally, worse on left.  Sensation intact and equal to bilateral lower extremities.  Plantar flexion/dorsiflexion, knee extension/knee flexion 5/5 bilaterally.   Skin:     General: Skin is warm and dry.   Neurological:      General: No focal deficit present.      Mental Status: He is alert.   Psychiatric:         Mood and Affect: Mood normal.         Behavior: Behavior normal.

## 2021-10-07 ENCOUNTER — OFFICE VISIT (OUTPATIENT)
Dept: NEUROSURGERY | Facility: CLINIC | Age: 61
End: 2021-10-07
Payer: COMMERCIAL

## 2021-10-07 VITALS
OXYGEN SATURATION: 97 % | SYSTOLIC BLOOD PRESSURE: 126 MMHG | WEIGHT: 165.2 LBS | BODY MASS INDEX: 23.13 KG/M2 | HEART RATE: 60 BPM | DIASTOLIC BLOOD PRESSURE: 75 MMHG | HEIGHT: 71 IN

## 2021-10-07 DIAGNOSIS — M54.16 LUMBAR RADICULOPATHY: Primary | ICD-10-CM

## 2021-10-07 DIAGNOSIS — M54.42 ACUTE LEFT-SIDED LOW BACK PAIN WITH LEFT-SIDED SCIATICA: ICD-10-CM

## 2021-10-07 PROCEDURE — 99203 OFFICE O/P NEW LOW 30 MIN: CPT | Performed by: NURSE PRACTITIONER

## 2021-10-07 ASSESSMENT — PAIN SCALES - GENERAL: PAINLEVEL: NO PAIN (1)

## 2021-10-07 ASSESSMENT — MIFFLIN-ST. JEOR: SCORE: 1576.47

## 2021-10-07 NOTE — LETTER
10/7/2021         RE: Bora Monroy  [address]  [facility] [address]        Dear Colleague,    Thank you for referring your patient, Bora Monroy, to [facility]. Please see a copy of my visit note below.    Dr. Bon Macias   [facility] Visit      CC: low back pain, left leg pain     Primary care Provider: Yakov Jones    Reason For Visit:   I was asked by Leonora De Leon PA-C to consult on the patient for: acute left-sided low back pain with left-sided sciatica      HPI: Bora Monroy is a 61 year old male who presents for evaluation of acute on chronic left side low back pain and left leg pain that increased about 3 months ago. Denies any precipitating trauma or injuries. Pain is located in the left side low back and radiates to left buttocks and left lateral calf, occasional pain in posterior left thigh. Denies any paresthesias or weakness. Pain is worsened with heavy lifting. Pain is alleviated with activity and walking. He has been working with PT and completed recent prednisone taper. Denies any falls, foot drop, or bladder/bowel incontinence.     Current pain: 2/10   At worst: 7/10    Past Medical History:   Diagnosis Date     Hyperlipidemia LDL goal < 130     declines statins -- favorable CAC in 2/18     Hypertension goal BP (blood pressure) < 140/90 11/17/2015    NL stress test 4/1/16, med started 11/16     Impaired fasting glucose      Kidney stone 12/8/2016     Seropositive rheumatoid arthritis of multiple sites (H) 11/17/2015       Past Medical History reviewed with patient during visit.    Past Surgical History:   Procedure Laterality Date     COLONOSCOPY  11/5/2010    COLONOSCOPY performed by FERMIN MCCLELLAND at [facility]     COLONOSCOPY N/A 1/25/2021    Procedure: COLONOSCOPY;  Surgeon: Anderson Heredia MD;  Location: [facility]     TONSILLECTOMY  1964    AGE 4     Past Surgical History reviewed with patient during  visit.    Current Outpatient Medications   Medication     amLODIPine-benazepril (LOTREL) 5-20 MG capsule     Cholecalciferol (VITAMIN D PO)     cinnamon 500 MG TABS     FISH OIL 1000 MG OR CAPS     glucosamine-chondroitin 500-400 MG CAPS per capsule     KRILL OIL PO     leflunomide (ARAVA) 20 MG tablet     Misc Natural Products (TURMERIC CURCUMIN) CAPS     MULTI-VITAMIN OR TABS     psyllium (METAMUCIL) 30.9 % POWD     triamcinolone (KENALOG) 0.1 % external cream     VITAMIN C OR     VITAMIN E PO     No current facility-administered medications for this visit.       Allergies   Allergen Reactions     Azithromycin Diarrhea       Social History     Socioeconomic History     Marital status:      Spouse name: Not on file     Number of children: Not on file     Years of education: Not on file     Highest education level: Not on file   Occupational History     Not on file   Tobacco Use     Smoking status: Never Smoker     Smokeless tobacco: Never Used   Vaping Use     Vaping Use: Never used   Substance and Sexual Activity     Alcohol use: Yes     Comment: a few drinks on the weekend     Drug use: No     Sexual activity: Yes     Partners: Female   Other Topics Concern     Parent/sibling w/ CABG, MI or angioplasty before 65F 55M? No   Social History Narrative     Not on file     Social Determinants of Health     Financial Resource Strain:      Difficulty of Paying Living Expenses:    Food Insecurity:      Worried About Running Out of Food in the Last Year:      Ran Out of Food in the Last Year:    Transportation Needs:      Lack of Transportation (Medical):      Lack of Transportation (Non-Medical):    Physical Activity:      Days of Exercise per Week:      Minutes of Exercise per Session:    Stress:      Feeling of Stress :    Social Connections:      Frequency of Communication with Friends and Family:      Frequency of Social Gatherings with Friends and Family:      Attends Muslim Services:      Active Member of  "Clubs or Organizations:      Attends Club or Organization Meetings:      Marital Status:    Intimate Partner Violence:      Fear of Current or Ex-Partner:      Emotionally Abused:      Physically Abused:      Sexually Abused:        Family History   Problem Relation Age of Onset     Lipids Sister      Diabetes Sister      Lipids Father      Hypertension Father      Cerebrovascular Disease Maternal Aunt      Cancer No family hx of        ROS: 10 point ROS neg other than the symptoms noted above in the HPI.    Vital Signs: /75   Pulse 60   Ht 5' 11\" (1.803 m)   Wt 165 lb 3.2 oz (74.9 kg)   SpO2 97%   BMI 23.04 kg/m      Examination:  Constitutional:  Alert, well nourished, NAD.  HEENT: Normocephalic, atraumatic.   Pulmonary:  Without shortness of breath, normal effort.   Cardiovascular:  No pitting edema of BLE.      Neurological:  Awake  Alert  Oriented x 3  Speech clear  Cranial nerves II - XII grossly intact  PERRL  EOMI  Face symmetric  Tongue midline  Motor exam   Hip Flexor:                 Right: 5/5  Left:  5/5  Quadriceps:               Right:  5/5  Left:  5/5  Hamstrings:               Right:  5/5  Left:  5/5  Gastroc Soleus:         Right:  5/5  Left:  5/5  Tib/Ant:                      Right:  5/5  Left:  5/5  EHL:                          Right:  5/5  Left:  5/5         Sensation normal to bilateral lower extremities.    Reflexes are 2+ in the patellar and Achilles. There is no clonus. Downgoing Babinski.    Musculoskeletal:  Gait: Able to stand from a seated position. Normal non-antalgic, non-myelopathic gait. Able to heel/toe walk without loss of balance.    Lumbar examination reveals no tenderness of the spine or paraspinous muscles.  Hip height is symmetrical.  Negative SI joint, sciatic notch or greater trochanteric tenderness to palpation bilaterally.  Straight leg raise is negative bilaterally.      Imaging:   MR LUMBAR SPINE WITHOUT CONTRAST 5/27/2021 8:13 PM                             "                                      IMPRESSION:  1. Multilevel degenerative disc and facet disease most advanced at  L4-L5 where there is mild central canal stenosis and moderate  bilateral neural foraminal stenosis.  2. Small central disc protrusion at L5-S1 without any significant  stenosis.    Assessment/Plan:   Lumbar radiculopathy    61 year old male who presents for evaluation of acute on chronic left side low back pain and left leg pain that increased about 3 months ago. Denies any precipitating trauma or injuries. Pain is located in the left side low back and radiates to left buttocks and left lateral calf, occasional pain in posterior left thigh. Denies any paresthesias or weakness. He has been working with PT. Imaging reviewed and we discussed options at this time. Patient would like to try a lumbar PREETI. Placed order. Advised patient to call clinic if symptoms persist or worsen.      Advised patient to call our clinic with any questions or concerns. Discussed red flag symptoms and advised to seek medical attention if these develop. Patient voiced understanding and agreement.      Ashley Arriaga CNP  [facility]  [address] [address]  Tel [phone]  Pager [phone]          Again, thank you for allowing me to participate in the care of your patient.        Sincerely,        Ashley Arriaga, TISHA

## 2021-10-07 NOTE — PROGRESS NOTES
Dr. Bon KING [facility] Visit      CC: low back pain, left leg pain     Primary care Provider: Yakov Jones    Reason For Visit:   I was asked by Leonora De Leon PA-C to consult on the patient for: acute left-sided low back pain with left-sided sciatica      HPI: Bora Monroy is a 61 year old male who presents for evaluation of acute on chronic left side low back pain and left leg pain that increased about 3 months ago. Denies any precipitating trauma or injuries. Pain is located in the left side low back and radiates to left buttocks and left lateral calf, occasional pain in posterior left thigh. Denies any paresthesias or weakness. Pain is worsened with heavy lifting. Pain is alleviated with activity and walking. He has been working with PT and completed recent prednisone taper. Denies any falls, foot drop, or bladder/bowel incontinence.     Current pain: 2/10   At worst: 7/10    Past Medical History:   Diagnosis Date     Hyperlipidemia LDL goal < 130     declines statins -- favorable CAC in 2/18     Hypertension goal BP (blood pressure) < 140/90 11/17/2015    NL stress test 4/1/16, med started 11/16     Impaired fasting glucose      Kidney stone 12/8/2016     Seropositive rheumatoid arthritis of multiple sites (H) 11/17/2015       Past Medical History reviewed with patient during visit.    Past Surgical History:   Procedure Laterality Date     COLONOSCOPY  11/5/2010    COLONOSCOPY performed by FERMIN MCCLELLAND at [facility]     COLONOSCOPY N/A 1/25/2021    Procedure: COLONOSCOPY;  Surgeon: Anderson Heredia MD;  Location: [facility]     TONSILLECTOMY  1964    AGE 4     Past Surgical History reviewed with patient during visit.    Current Outpatient Medications   Medication     amLODIPine-benazepril (LOTREL) 5-20 MG capsule     Cholecalciferol (VITAMIN D PO)     cinnamon 500 MG TABS     FISH OIL 1000 MG OR CAPS     glucosamine-chondroitin 500-400 MG CAPS per capsule     KRILL OIL PO      leflunomide (ARAVA) 20 MG tablet     Misc Natural Products (TURMERIC CURCUMIN) CAPS     MULTI-VITAMIN OR TABS     psyllium (METAMUCIL) 30.9 % POWD     triamcinolone (KENALOG) 0.1 % external cream     VITAMIN C OR     VITAMIN E PO     No current facility-administered medications for this visit.       Allergies   Allergen Reactions     Azithromycin Diarrhea       Social History     Socioeconomic History     Marital status:      Spouse name: Not on file     Number of children: Not on file     Years of education: Not on file     Highest education level: Not on file   Occupational History     Not on file   Tobacco Use     Smoking status: Never Smoker     Smokeless tobacco: Never Used   Vaping Use     Vaping Use: Never used   Substance and Sexual Activity     Alcohol use: Yes     Comment: a few drinks on the weekend     Drug use: No     Sexual activity: Yes     Partners: Female   Other Topics Concern     Parent/sibling w/ CABG, MI or angioplasty before 65F 55M? No   Social History Narrative     Not on file     Social Determinants of Health     Financial Resource Strain:      Difficulty of Paying Living Expenses:    Food Insecurity:      Worried About Running Out of Food in the Last Year:      Ran Out of Food in the Last Year:    Transportation Needs:      Lack of Transportation (Medical):      Lack of Transportation (Non-Medical):    Physical Activity:      Days of Exercise per Week:      Minutes of Exercise per Session:    Stress:      Feeling of Stress :    Social Connections:      Frequency of Communication with Friends and Family:      Frequency of Social Gatherings with Friends and Family:      Attends Holiness Services:      Active Member of Clubs or Organizations:      Attends Club or Organization Meetings:      Marital Status:    Intimate Partner Violence:      Fear of Current or Ex-Partner:      Emotionally Abused:      Physically Abused:      Sexually Abused:        Family History   Problem Relation Age  "of Onset     Lipids Sister      Diabetes Sister      Lipids Father      Hypertension Father      Cerebrovascular Disease Maternal Aunt      Cancer No family hx of        ROS: 10 point ROS neg other than the symptoms noted above in the HPI.    Vital Signs: /75   Pulse 60   Ht 5' 11\" (1.803 m)   Wt 165 lb 3.2 oz (74.9 kg)   SpO2 97%   BMI 23.04 kg/m      Examination:  Constitutional:  Alert, well nourished, NAD.  HEENT: Normocephalic, atraumatic.   Pulmonary:  Without shortness of breath, normal effort.   Cardiovascular:  No pitting edema of BLE.      Neurological:  Awake  Alert  Oriented x 3  Speech clear  Cranial nerves II - XII grossly intact  PERRL  EOMI  Face symmetric  Tongue midline  Motor exam   Hip Flexor:                 Right: 5/5  Left:  5/5  Quadriceps:               Right:  5/5  Left:  5/5  Hamstrings:               Right:  5/5  Left:  5/5  Gastroc Soleus:         Right:  5/5  Left:  5/5  Tib/Ant:                      Right:  5/5  Left:  5/5  EHL:                          Right:  5/5  Left:  5/5         Sensation normal to bilateral lower extremities.    Reflexes are 2+ in the patellar and Achilles. There is no clonus. Downgoing Babinski.    Musculoskeletal:  Gait: Able to stand from a seated position. Normal non-antalgic, non-myelopathic gait. Able to heel/toe walk without loss of balance.    Lumbar examination reveals no tenderness of the spine or paraspinous muscles.  Hip height is symmetrical.  Negative SI joint, sciatic notch or greater trochanteric tenderness to palpation bilaterally.  Straight leg raise is negative bilaterally.      Imaging:   MR LUMBAR SPINE WITHOUT CONTRAST 5/27/2021 8:13 PM                                                                  IMPRESSION:  1. Multilevel degenerative disc and facet disease most advanced at  L4-L5 where there is mild central canal stenosis and moderate  bilateral neural foraminal stenosis.  2. Small central disc protrusion at L5-S1 without " any significant  stenosis.    Assessment/Plan:   Lumbar radiculopathy    61 year old male who presents for evaluation of acute on chronic left side low back pain and left leg pain that increased about 3 months ago. Denies any precipitating trauma or injuries. Pain is located in the left side low back and radiates to left buttocks and left lateral calf, occasional pain in posterior left thigh. Denies any paresthesias or weakness. He has been working with PT. Imaging reviewed and we discussed options at this time. Patient would like to try a lumbar PREETI. Placed order. Advised patient to call clinic if symptoms persist or worsen.      Advised patient to call our clinic with any questions or concerns. Discussed red flag symptoms and advised to seek medical attention if these develop. Patient voiced understanding and agreement.      Ashley Arriaga CNP  [facility]  [address] [address]  Tel [phone]  Pager [phone]

## 2021-10-07 NOTE — NURSING NOTE
Chief Complaint   Patient presents with     Back Pain     left-sided low back pain      Jeffrey Mchugh

## 2021-10-07 NOTE — PATIENT INSTRUCTIONS
Referral for lumbar epidural steroid injection.     Please call our clinic if symptoms persist, change, or worsen at any time.    Ashley Arriaga CNP  [facility]  [address] [address]  Tel [phone]  Pager [phone]

## 2021-11-04 ENCOUNTER — TELEPHONE (OUTPATIENT)
Dept: FAMILY MEDICINE | Facility: CLINIC | Age: 61
End: 2021-11-04

## 2021-11-04 DIAGNOSIS — R73.01 IMPAIRED FASTING GLUCOSE: ICD-10-CM

## 2021-11-04 DIAGNOSIS — E78.5 HYPERLIPIDEMIA WITH TARGET LDL LESS THAN 130: Primary | ICD-10-CM

## 2021-11-04 NOTE — TELEPHONE ENCOUNTER
Patient has to have some lab work done for Dr Mazariegos, he also has a physical set up with Dr Jones.  He would like to do any labs that Dr Jones would like for the physical done at the same time as the labs he is having for Dr Mazariegos.  Would you like to place orders?

## 2021-11-26 ENCOUNTER — LAB (OUTPATIENT)
Dept: LAB | Facility: CLINIC | Age: 61
End: 2021-11-26
Payer: COMMERCIAL

## 2021-11-26 DIAGNOSIS — M05.79 SEROPOSITIVE RHEUMATOID ARTHRITIS OF MULTIPLE SITES (H): ICD-10-CM

## 2021-11-26 DIAGNOSIS — R73.01 IMPAIRED FASTING GLUCOSE: ICD-10-CM

## 2021-11-26 DIAGNOSIS — Z79.899 HIGH RISK MEDICATION USE: ICD-10-CM

## 2021-11-26 DIAGNOSIS — E78.5 HYPERLIPIDEMIA WITH TARGET LDL LESS THAN 130: ICD-10-CM

## 2021-11-26 LAB
ALBUMIN SERPL-MCNC: 3.9 G/DL (ref 3.4–5)
ALP SERPL-CCNC: 76 U/L (ref 40–150)
ALT SERPL W P-5'-P-CCNC: 31 U/L (ref 0–70)
ANION GAP SERPL CALCULATED.3IONS-SCNC: 2 MMOL/L (ref 3–14)
AST SERPL W P-5'-P-CCNC: 19 U/L (ref 0–45)
BASOPHILS # BLD AUTO: 0 10E3/UL (ref 0–0.2)
BASOPHILS NFR BLD AUTO: 0 %
BILIRUB DIRECT SERPL-MCNC: 0.1 MG/DL (ref 0–0.2)
BILIRUB SERPL-MCNC: 0.5 MG/DL (ref 0.2–1.3)
BUN SERPL-MCNC: 14 MG/DL (ref 7–30)
CALCIUM SERPL-MCNC: 8.7 MG/DL (ref 8.5–10.1)
CHLORIDE BLD-SCNC: 100 MMOL/L (ref 94–109)
CHOLEST SERPL-MCNC: 222 MG/DL
CO2 SERPL-SCNC: 30 MMOL/L (ref 20–32)
CREAT SERPL-MCNC: 0.86 MG/DL (ref 0.66–1.25)
CRP SERPL-MCNC: <2.9 MG/L (ref 0–8)
EOSINOPHIL # BLD AUTO: 0.1 10E3/UL (ref 0–0.7)
EOSINOPHIL NFR BLD AUTO: 2 %
ERYTHROCYTE [DISTWIDTH] IN BLOOD BY AUTOMATED COUNT: 11.6 % (ref 10–15)
ERYTHROCYTE [SEDIMENTATION RATE] IN BLOOD BY WESTERGREN METHOD: 6 MM/HR (ref 0–20)
FASTING STATUS PATIENT QL REPORTED: YES
GFR SERPL CREATININE-BSD FRML MDRD: >90 ML/MIN/1.73M2
GLUCOSE BLD-MCNC: 109 MG/DL (ref 70–99)
HBA1C MFR BLD: 5.2 % (ref 0–5.6)
HCT VFR BLD AUTO: 38.9 % (ref 40–53)
HDLC SERPL-MCNC: 57 MG/DL
HGB BLD-MCNC: 13.4 G/DL (ref 13.3–17.7)
LDLC SERPL CALC-MCNC: 141 MG/DL
LYMPHOCYTES # BLD AUTO: 1.4 10E3/UL (ref 0.8–5.3)
LYMPHOCYTES NFR BLD AUTO: 30 %
MCH RBC QN AUTO: 33.3 PG (ref 26.5–33)
MCHC RBC AUTO-ENTMCNC: 34.4 G/DL (ref 31.5–36.5)
MCV RBC AUTO: 97 FL (ref 78–100)
MONOCYTES # BLD AUTO: 0.5 10E3/UL (ref 0–1.3)
MONOCYTES NFR BLD AUTO: 10 %
NEUTROPHILS # BLD AUTO: 2.6 10E3/UL (ref 1.6–8.3)
NEUTROPHILS NFR BLD AUTO: 57 %
NONHDLC SERPL-MCNC: 165 MG/DL
PLATELET # BLD AUTO: 241 10E3/UL (ref 150–450)
POTASSIUM BLD-SCNC: 4.2 MMOL/L (ref 3.4–5.3)
PROT SERPL-MCNC: 6.8 G/DL (ref 6.8–8.8)
RBC # BLD AUTO: 4.03 10E6/UL (ref 4.4–5.9)
SODIUM SERPL-SCNC: 132 MMOL/L (ref 133–144)
TRIGL SERPL-MCNC: 121 MG/DL
WBC # BLD AUTO: 4.6 10E3/UL (ref 4–11)

## 2021-11-26 PROCEDURE — 82248 BILIRUBIN DIRECT: CPT

## 2021-11-26 PROCEDURE — 86140 C-REACTIVE PROTEIN: CPT

## 2021-11-26 PROCEDURE — 85025 COMPLETE CBC W/AUTO DIFF WBC: CPT

## 2021-11-26 PROCEDURE — 80053 COMPREHEN METABOLIC PANEL: CPT

## 2021-11-26 PROCEDURE — 80061 LIPID PANEL: CPT

## 2021-11-26 PROCEDURE — 85652 RBC SED RATE AUTOMATED: CPT

## 2021-11-26 PROCEDURE — 36415 COLL VENOUS BLD VENIPUNCTURE: CPT

## 2021-11-26 PROCEDURE — 83036 HEMOGLOBIN GLYCOSYLATED A1C: CPT

## 2021-12-07 PROBLEM — M54.42 ACUTE LEFT-SIDED LOW BACK PAIN WITH LEFT-SIDED SCIATICA: Status: RESOLVED | Noted: 2021-06-04 | Resolved: 2021-12-07

## 2021-12-07 NOTE — PROGRESS NOTES
Subjective:  HPI  Physical Exam  Oswestry Score: 0 %                 Objective:  System    Physical Exam    General     ROS    Assessment/Plan:    DISCHARGE REPORT    Progress reporting period is from 6/04/21 to 7/20/21.       SUBJECTIVE  Patient reports approximately 80% improvement in his LBP since the start of therapy.  Groin and LE pain is essentially gone.  He has been doing exercises 2x/day, but admits he sometimes gets busy at work and forgets to do them.  Sitting tolerance has increased to one hour.     Current Pain level: 0/10.     Initial Pain level: 3/10.   Changes in function:  Yes (See Goal flowsheet attached for changes in current functional level)  Adverse reaction to treatment or activity: None    OBJECTIVE  Lumbar AROM: Flex min loss, Ext mild loss, SG WNL bilaterally.   Core Strength:TrA strength 3-/5.     ASSESSMENT/PLAN  STG/LTGs have been met or progress has been made towards goals:  Yes (See Goal flow sheet completed today.)  Assessment of Progress: The patient has met all of his short and long term goals.  Self Management Plans:  Patient  has been instructed in self management of symptoms.  PT intervention is no longer required to meet STG/LTG.    Recommendations:  This patient is ready to be discharged from therapy and continue their home treatment program.    Please refer to the daily flowsheet for treatment today, total treatment time and time spent performing 1:1 timed codes.

## 2021-12-08 ENCOUNTER — OFFICE VISIT (OUTPATIENT)
Dept: FAMILY MEDICINE | Facility: CLINIC | Age: 61
End: 2021-12-08
Payer: COMMERCIAL

## 2021-12-08 VITALS
HEART RATE: 61 BPM | DIASTOLIC BLOOD PRESSURE: 69 MMHG | SYSTOLIC BLOOD PRESSURE: 136 MMHG | OXYGEN SATURATION: 96 % | WEIGHT: 167.4 LBS | BODY MASS INDEX: 23.35 KG/M2 | TEMPERATURE: 97.8 F

## 2021-12-08 DIAGNOSIS — I10 HYPERTENSION GOAL BP (BLOOD PRESSURE) < 140/90: ICD-10-CM

## 2021-12-08 DIAGNOSIS — E78.5 HYPERLIPIDEMIA WITH TARGET LDL LESS THAN 130: ICD-10-CM

## 2021-12-08 DIAGNOSIS — M05.79 SEROPOSITIVE RHEUMATOID ARTHRITIS OF MULTIPLE SITES (H): ICD-10-CM

## 2021-12-08 DIAGNOSIS — R73.01 IMPAIRED FASTING GLUCOSE: ICD-10-CM

## 2021-12-08 DIAGNOSIS — Z79.899 HIGH RISK MEDICATIONS (NOT ANTICOAGULANTS) LONG-TERM USE: ICD-10-CM

## 2021-12-08 DIAGNOSIS — Z00.00 ROUTINE GENERAL MEDICAL EXAMINATION AT A HEALTH CARE FACILITY: Primary | ICD-10-CM

## 2021-12-08 PROCEDURE — 99213 OFFICE O/P EST LOW 20 MIN: CPT | Mod: 25 | Performed by: FAMILY MEDICINE

## 2021-12-08 PROCEDURE — 99396 PREV VISIT EST AGE 40-64: CPT | Performed by: FAMILY MEDICINE

## 2021-12-08 RX ORDER — AMLODIPINE BESYLATE 5 MG/1
5 TABLET ORAL DAILY
Qty: 90 TABLET | Refills: 3 | Status: SHIPPED | OUTPATIENT
Start: 2021-12-08 | End: 2022-12-09

## 2021-12-08 RX ORDER — LOSARTAN POTASSIUM 50 MG/1
50 TABLET ORAL DAILY
Qty: 90 TABLET | Refills: 3 | Status: SHIPPED | OUTPATIENT
Start: 2021-12-08 | End: 2022-12-09

## 2021-12-08 ASSESSMENT — ENCOUNTER SYMPTOMS
HEMATOCHEZIA: 0
CONSTIPATION: 0
FREQUENCY: 0
HEARTBURN: 0
EYE PAIN: 0
NERVOUS/ANXIOUS: 0
CHILLS: 0
HEMATURIA: 0
JOINT SWELLING: 0
PALPITATIONS: 0
PARESTHESIAS: 0
DYSURIA: 0
DIZZINESS: 0
MYALGIAS: 0
ARTHRALGIAS: 0
NAUSEA: 0
ABDOMINAL PAIN: 0
SHORTNESS OF BREATH: 0
DIARRHEA: 0
HEADACHES: 0
FEVER: 0
SORE THROAT: 0
WEAKNESS: 0
COUGH: 1

## 2021-12-08 NOTE — PROGRESS NOTES
SUBJECTIVE:   CC: Bora Monroy is an 61 year old male who presents for a preventative health visit and follow-up on baseline health conditions.       Patient has been advised of split billing requirements and indicates understanding: Yes  Healthy Habits:     Getting at least 3 servings of Calcium per day:  Yes    Bi-annual eye exam:  Yes    Dental care twice a year:  Yes    Sleep apnea or symptoms of sleep apnea:  None    Diet:  Low salt and Low fat/cholesterol    Frequency of exercise:  4-5 days/week    Duration of exercise:  15-30 minutes    Taking medications regularly:  Yes    Barriers to taking medications:  None    Medication side effects:  Other    PHQ-2 Total Score: 0    Additional concerns today:  No    He has been working hard on eating healthy and getting exercise since his last physical with me a year ago in order to try to improve his cholesterol values.  He did come in recently for fasting lab work.  He remains on amlodipine benazepril for hypertension.  He has had a dry ticklish cough, however, on the medicine that is quite annoying to him.    He continues to see Dr. Mazariegos of rheumatology for his RA.  He gets lab work done every 3 months for that.      Today's PHQ-2 Score:   PHQ-2 ( 1999 Pfizer) 12/8/2021   Q1: Little interest or pleasure in doing things 0   Q2: Feeling down, depressed or hopeless 0   PHQ-2 Score 0   PHQ-2 Total Score (12-17 Years)- Positive if 3 or more points; Administer PHQ-A if positive -   Q1: Little interest or pleasure in doing things Not at all   Q2: Feeling down, depressed or hopeless Not at all   PHQ-2 Score 0       Abuse: Current or Past(Physical, Sexual or Emotional)- No  Do you feel safe in your environment? Yes        Social History     Tobacco Use     Smoking status: Never Smoker     Smokeless tobacco: Never Used   Substance Use Topics     Alcohol use: Yes     Comment: a few drinks on the weekend     If you drink alcohol do you typically have >3 drinks per day or  >7 drinks per week? No    Alcohol Use 12/8/2021   Prescreen: >3 drinks/day or >7 drinks/week? No   Prescreen: >3 drinks/day or >7 drinks/week? -       Last PSA:   PSA   Date Value Ref Range Status   11/30/2020 1.45 0 - 4 ug/L Final     Comment:     Assay Method:  Chemiluminescence using Siemens Vista analyzer       Reviewed orders with patient. Reviewed health maintenance and updated orders accordingly - Yes  Patient Active Problem List   Diagnosis     Presbyopias     Hyperopia     Hyperlipidemia with target LDL less than 130     Impaired fasting glucose     High risk medications (not anticoagulants) long-term use     Advanced directives, counseling/discussion     Seropositive rheumatoid arthritis of multiple sites (HCC)     Hypertension goal BP (blood pressure) < 140/90     Kidney stone     Past Surgical History:   Procedure Laterality Date     COLONOSCOPY  11/5/2010    COLONOSCOPY performed by FERMIN MCCLELLAND at [facility]     COLONOSCOPY N/A 1/25/2021    Procedure: COLONOSCOPY;  Surgeon: Anderson Heredia MD;  Location: [facility]     TONSILLECTOMY  1964    AGE 4       Social History     Tobacco Use     Smoking status: Never Smoker     Smokeless tobacco: Never Used   Substance Use Topics     Alcohol use: Yes     Comment: a few drinks on the weekend     Family History   Problem Relation Age of Onset     Lipids Sister      Diabetes Sister      Lipids Father      Hypertension Father      Cerebrovascular Disease Maternal Aunt      Cancer No family hx of          Current Outpatient Medications   Medication Sig Dispense Refill     amLODIPine-benazepril (LOTREL) 5-20 MG capsule Take 1 capsule by mouth daily 90 capsule 3     Cholecalciferol (VITAMIN D PO) Take 1,000 Units by mouth       cinnamon 500 MG TABS Take 1 tablet by mouth daily        FISH OIL 1000 MG OR CAPS 1 tablet daily       glucosamine-chondroitin 500-400 MG CAPS per capsule Take 1 capsule by mouth daily       KRILL OIL PO Take 1 tablet by mouth daily        leflunomide (ARAVA) 20 MG tablet Take 1 tablet (20 mg) by mouth every other day .  Labs required every 8-12 weeks 45 tablet 0     Misc Natural Products (TURMERIC CURCUMIN) CAPS Take by mouth daily       MULTI-VITAMIN OR TABS 1 TABLET DAILY       psyllium (METAMUCIL) 30.9 % POWD Take 1 tsp by mouth 2 times daily.       VITAMIN C OR None Entered       VITAMIN E PO        triamcinolone (KENALOG) 0.1 % external cream Apply sparingly to affected area two to three times daily as needed for hand rash (Patient not taking: Reported on 12/8/2021) 60 g 2     Allergies   Allergen Reactions     Azithromycin Diarrhea       Reviewed and updated as needed this visit by clinical staff  Tobacco  Allergies  Meds   Med Hx  Surg Hx  Fam Hx  Soc Hx       Reviewed and updated as needed this visit by Provider                   Review of Systems   Constitutional: Negative for chills and fever.   HENT: Negative for congestion, ear pain, hearing loss and sore throat.    Eyes: Negative for pain and visual disturbance.   Respiratory: Positive for cough. Negative for shortness of breath.    Cardiovascular: Negative for chest pain, palpitations and peripheral edema.   Gastrointestinal: Negative for abdominal pain, constipation, diarrhea, heartburn, hematochezia and nausea.   Genitourinary: Negative for dysuria, frequency, genital sores, hematuria, impotence, penile discharge and urgency.   Musculoskeletal: Negative for arthralgias, joint swelling and myalgias.   Skin: Negative for rash.   Neurological: Negative for dizziness, weakness, headaches and paresthesias.   Psychiatric/Behavioral: Negative for mood changes. The patient is not nervous/anxious.          OBJECTIVE:   /69 (BP Location: Right arm, Patient Position: Sitting, Cuff Size: Adult Regular)   Pulse 61   Temp 97.8  F (36.6  C) (Oral)   Wt 75.9 kg (167 lb 6.4 oz)   SpO2 96%   BMI 23.35 kg/m      Physical Exam  GENERAL: healthy, alert and no distress  EYES: Eyes grossly  normal to inspection, PERRL and conjunctivae and sclerae normal  HENT: Grossly normal  NECK: no adenopathy, no asymmetry, masses, or scars and thyroid normal to palpation  RESP: lungs clear to auscultation - no rales, rhonchi or wheezes  CV: regular rate and rhythm, normal S1 S2, no S3 or S4, no murmur, click or rub, no peripheral edema and peripheral pulses strong  ABDOMEN: soft, nontender, no hepatosplenomegaly, no masses  MS: no gross musculoskeletal defects noted, no edema  SKIN: no suspicious lesions or rashes  NEURO: Normal strength and tone, mentation intact and speech normal  PSYCH: mentation appears normal, affect normal/bright    Diagnostic Test Results:  Labs reviewed in Epic  Lab on 11/26/2021   Component Date Value Ref Range Status     Cholesterol 11/26/2021 222* <200 mg/dL Final     Triglycerides 11/26/2021 121  <150 mg/dL Final     Direct Measure HDL 11/26/2021 57  >=40 mg/dL Final     LDL Cholesterol Calculated 11/26/2021 141* <=100 mg/dL Final     Non HDL Cholesterol 11/26/2021 165* <130 mg/dL Final     Patient Fasting > 8hrs? 11/26/2021 Yes   Final     Hemoglobin A1C 11/26/2021 5.2  0.0 - 5.6 % Final    Normal <5.7%   Prediabetes 5.7-6.4%    Diabetes 6.5% or higher     Note: Adopted from ADA consensus guidelines.     Sodium 11/26/2021 132* 133 - 144 mmol/L Final     Potassium 11/26/2021 4.2  3.4 - 5.3 mmol/L Final     Chloride 11/26/2021 100  94 - 109 mmol/L Final     Carbon Dioxide (CO2) 11/26/2021 30  20 - 32 mmol/L Final     Anion Gap 11/26/2021 2* 3 - 14 mmol/L Final     Urea Nitrogen 11/26/2021 14  7 - 30 mg/dL Final     Creatinine 11/26/2021 0.86  0.66 - 1.25 mg/dL Final     Calcium 11/26/2021 8.7  8.5 - 10.1 mg/dL Final     Glucose 11/26/2021 109* 70 - 99 mg/dL Final     GFR Estimate 11/26/2021 >90  >60 mL/min/1.73m2 Final    As of July 11, 2021, eGFR is calculated by the CKD-EPI creatinine equation, without race adjustment. eGFR can be influenced by muscle mass, exercise, and diet. The  reported eGFR is an estimation only and is only applicable if the renal function is stable.     Bilirubin Total 11/26/2021 0.5  0.2 - 1.3 mg/dL Final     Bilirubin Direct 11/26/2021 0.1  0.0 - 0.2 mg/dL Final     Protein Total 11/26/2021 6.8  6.8 - 8.8 g/dL Final     Albumin 11/26/2021 3.9  3.4 - 5.0 g/dL Final     Alkaline Phosphatase 11/26/2021 76  40 - 150 U/L Final     AST 11/26/2021 19  0 - 45 U/L Final     ALT 11/26/2021 31  0 - 70 U/L Final     CRP Inflammation 11/26/2021 <2.9  0.0 - 8.0 mg/L Final     Erythrocyte Sedimentation Rate 11/26/2021 6  0 - 20 mm/hr Final     WBC Count 11/26/2021 4.6  4.0 - 11.0 10e3/uL Final     RBC Count 11/26/2021 4.03* 4.40 - 5.90 10e6/uL Final     Hemoglobin 11/26/2021 13.4  13.3 - 17.7 g/dL Final     Hematocrit 11/26/2021 38.9* 40.0 - 53.0 % Final     MCV 11/26/2021 97  78 - 100 fL Final     MCH 11/26/2021 33.3* 26.5 - 33.0 pg Final     MCHC 11/26/2021 34.4  31.5 - 36.5 g/dL Final     RDW 11/26/2021 11.6  10.0 - 15.0 % Final     Platelet Count 11/26/2021 241  150 - 450 10e3/uL Final     % Neutrophils 11/26/2021 57  % Final     % Lymphocytes 11/26/2021 30  % Final     % Monocytes 11/26/2021 10  % Final     % Eosinophils 11/26/2021 2  % Final     % Basophils 11/26/2021 0  % Final     Absolute Neutrophils 11/26/2021 2.6  1.6 - 8.3 10e3/uL Final     Absolute Lymphocytes 11/26/2021 1.4  0.8 - 5.3 10e3/uL Final     Absolute Monocytes 11/26/2021 0.5  0.0 - 1.3 10e3/uL Final     Absolute Eosinophils 11/26/2021 0.1  0.0 - 0.7 10e3/uL Final     Absolute Basophils 11/26/2021 0.0  0.0 - 0.2 10e3/uL Final     The 10-year ASCVD risk score (Faina HERBERT Jr., et al., 2013) is: 12.1%    Values used to calculate the score:      Age: 61 years      Sex: Male      Is Non- : No      Diabetic: No      Tobacco smoker: No      Systolic Blood Pressure: 136 mmHg      Is BP treated: Yes      HDL Cholesterol: 57 mg/dL      Total Cholesterol: 222 mg/dL      ASSESSMENT/PLAN:        "ICD-10-CM    1. Routine general medical examination at a health care facility  Z00.00 Varicella Zoster Virus Antibody IgG   2. Hypertension goal BP (blood pressure) < 140/90  I10 losartan (COZAAR) 50 MG tablet     amLODIPine (NORVASC) 5 MG tablet   3. Hyperlipidemia with target LDL less than 130  E78.5    4. Impaired fasting glucose  R73.01    5. Seropositive rheumatoid arthritis of multiple sites (HCC)  M05.79    6. High risk medications (not anticoagulants) long-term use  Z79.899      Blood pressure and other vital signs look fine  His lipid values are much improved than last time, but still above goal and still in a range where a statin could be considered  I discussed this with him, but he would like to hold off on a statin and continue with diet and exercise treatment for his lipids  It sounds like he has and ACEI induced cough, so we will stop the benazepril and have him use amlodipine 5 mg along with losartan 50 mg daily  Continue ongoing rheumatology care with Dr. Mazariegos as per his recommendations  I discussed having him get a Shingrix vaccine, but he does not think he is ever had chickenpox  I will order a varicella antibody test to be done with his next lab draw because I suspect will be positive, in which case then a Shingrix vaccine would be prudent  Plan a tentative recheck with me in 1 year, or sooner prn    Patient has been advised of split billing requirements and indicates understanding: Yes  COUNSELING:   Reviewed preventive health counseling, as reflected in patient instructions       Regular exercise       Healthy diet/nutrition    Estimated body mass index is 23.35 kg/m  as calculated from the following:    Height as of 10/7/21: 1.803 m (5' 11\").    Weight as of this encounter: 75.9 kg (167 lb 6.4 oz).         He reports that he has never smoked. He has never used smokeless tobacco.      Counseling Resources:  ATP IV Guidelines  Pooled Cohorts Equation Calculator  FRAX Risk Assessment  ICSI " Preventive Guidelines  Dietary Guidelines for Americans, 2010  USDA's MyPlate  ASA Prophylaxis  Lung CA Screening    Yakov Jones MD  [facility]

## 2022-01-12 DIAGNOSIS — M05.79 SEROPOSITIVE RHEUMATOID ARTHRITIS OF MULTIPLE SITES (H): ICD-10-CM

## 2022-01-12 NOTE — TELEPHONE ENCOUNTER
Medication:   Leflunomide 20 mg  Last written on:   8/16/2021  Quantity:   45    Refills:   0    Last office visit:   8/16/2021  Canceled: 12/14/2021  Next office visit:   2/15/2022  Last labs:   11/26/2021    Shonna Kenny CMA Rheumatology  1/12/2022 8:25 AM

## 2022-01-13 RX ORDER — LEFLUNOMIDE 20 MG/1
20 TABLET ORAL EVERY OTHER DAY
Qty: 45 TABLET | Refills: 0 | Status: SHIPPED | OUTPATIENT
Start: 2022-01-13 | End: 2022-02-15

## 2022-02-08 ENCOUNTER — LAB (OUTPATIENT)
Dept: LAB | Facility: CLINIC | Age: 62
End: 2022-02-08
Payer: COMMERCIAL

## 2022-02-08 DIAGNOSIS — Z79.899 HIGH RISK MEDICATION USE: ICD-10-CM

## 2022-02-08 DIAGNOSIS — Z00.00 ROUTINE GENERAL MEDICAL EXAMINATION AT A HEALTH CARE FACILITY: ICD-10-CM

## 2022-02-08 DIAGNOSIS — M05.79 SEROPOSITIVE RHEUMATOID ARTHRITIS OF MULTIPLE SITES (H): ICD-10-CM

## 2022-02-08 LAB
ALBUMIN SERPL-MCNC: 4 G/DL (ref 3.4–5)
ALP SERPL-CCNC: 84 U/L (ref 40–150)
ALT SERPL W P-5'-P-CCNC: 28 U/L (ref 0–70)
AST SERPL W P-5'-P-CCNC: 22 U/L (ref 0–45)
BASOPHILS # BLD AUTO: 0 10E3/UL (ref 0–0.2)
BASOPHILS NFR BLD AUTO: 0 %
BILIRUB DIRECT SERPL-MCNC: <0.1 MG/DL (ref 0–0.2)
BILIRUB SERPL-MCNC: 0.4 MG/DL (ref 0.2–1.3)
CREAT SERPL-MCNC: 0.94 MG/DL (ref 0.66–1.25)
CRP SERPL-MCNC: <2.9 MG/L (ref 0–8)
EOSINOPHIL # BLD AUTO: 0.1 10E3/UL (ref 0–0.7)
EOSINOPHIL NFR BLD AUTO: 1 %
ERYTHROCYTE [DISTWIDTH] IN BLOOD BY AUTOMATED COUNT: 11.4 % (ref 10–15)
ERYTHROCYTE [SEDIMENTATION RATE] IN BLOOD BY WESTERGREN METHOD: 5 MM/HR (ref 0–20)
GFR SERPL CREATININE-BSD FRML MDRD: >90 ML/MIN/1.73M2
HCT VFR BLD AUTO: 39.4 % (ref 40–53)
HGB BLD-MCNC: 13.6 G/DL (ref 13.3–17.7)
LYMPHOCYTES # BLD AUTO: 1.5 10E3/UL (ref 0.8–5.3)
LYMPHOCYTES NFR BLD AUTO: 30 %
MCH RBC QN AUTO: 32.6 PG (ref 26.5–33)
MCHC RBC AUTO-ENTMCNC: 34.5 G/DL (ref 31.5–36.5)
MCV RBC AUTO: 95 FL (ref 78–100)
MONOCYTES # BLD AUTO: 0.4 10E3/UL (ref 0–1.3)
MONOCYTES NFR BLD AUTO: 8 %
NEUTROPHILS # BLD AUTO: 3 10E3/UL (ref 1.6–8.3)
NEUTROPHILS NFR BLD AUTO: 61 %
PLATELET # BLD AUTO: 219 10E3/UL (ref 150–450)
PROT SERPL-MCNC: 6.7 G/DL (ref 6.8–8.8)
RBC # BLD AUTO: 4.17 10E6/UL (ref 4.4–5.9)
WBC # BLD AUTO: 5 10E3/UL (ref 4–11)

## 2022-02-08 PROCEDURE — 36415 COLL VENOUS BLD VENIPUNCTURE: CPT

## 2022-02-08 PROCEDURE — 80076 HEPATIC FUNCTION PANEL: CPT

## 2022-02-08 PROCEDURE — 82565 ASSAY OF CREATININE: CPT

## 2022-02-08 PROCEDURE — 86140 C-REACTIVE PROTEIN: CPT

## 2022-02-08 PROCEDURE — 86787 VARICELLA-ZOSTER ANTIBODY: CPT

## 2022-02-08 PROCEDURE — 85025 COMPLETE CBC W/AUTO DIFF WBC: CPT

## 2022-02-08 PROCEDURE — 85652 RBC SED RATE AUTOMATED: CPT

## 2022-02-09 LAB
VZV IGG SER QL IA: 824.4 INDEX
VZV IGG SER QL IA: POSITIVE

## 2022-02-09 NOTE — RESULT ENCOUNTER NOTE
Jim,As expected, your test and/or work so probably for zoster antibody is positive, indicating that you have indeed had exposure to the chickenpox virus in the past, like the vast majority of people your age.  It would therefore be appropriate to get vaccinated against shingles at some point.    Yakov Jones MD

## 2022-02-15 ENCOUNTER — VIRTUAL VISIT (OUTPATIENT)
Dept: RHEUMATOLOGY | Facility: CLINIC | Age: 62
End: 2022-02-15
Payer: COMMERCIAL

## 2022-02-15 DIAGNOSIS — M05.79 SEROPOSITIVE RHEUMATOID ARTHRITIS OF MULTIPLE SITES (H): Primary | ICD-10-CM

## 2022-02-15 DIAGNOSIS — Z79.899 HIGH RISK MEDICATION USE: ICD-10-CM

## 2022-02-15 PROCEDURE — 99214 OFFICE O/P EST MOD 30 MIN: CPT | Mod: 95 | Performed by: INTERNAL MEDICINE

## 2022-02-15 RX ORDER — LEFLUNOMIDE 20 MG/1
20 TABLET ORAL EVERY OTHER DAY
Qty: 45 TABLET | Refills: 2 | Status: SHIPPED | OUTPATIENT
Start: 2022-02-15 | End: 2022-11-01

## 2022-02-15 NOTE — PATIENT INSTRUCTIONS
RHEUMATOLOGY    Dr. Eduard Mazariegos    [address] [place] [address]  Phone number: [phone]  Fax number: [phone]      Thank you for choosing [facility]!    Shonna Kenny CMA Rheumatology    --------------------------    A 4th dose of the mRNA COVID-19 vaccination is recommended to be received 5 months after the third mRNA COVID-19 vaccination was received. Hold leflunomide for 1 week after COVID-19 vaccination.

## 2022-02-15 NOTE — PROGRESS NOTES
Bora Monroy  is a 61 year old year old male who is being evaluated via a billable video visit.      How would you like to obtain your AVS? MyChart  If the video visit is dropped, the invitation should be resent by: Text to cell phone: [phone]  Will anyone else be joining your video visit? No    Rheumatology Video Visit      Bora Monroy MRN# [id number]   YOB: 1960 Age: 61 year old      Date of visit: 2/15/22   PCP: Dr. Yakov Jones     Chief Complaint     Patient presents with:  Rheumatoid Arthritis    Assessment and Plan     1. Seropositive rheumatoid arthritis (RF negative, CCP positive): Currently on leflunomide 20 mg every other day.  Doing well at this time with regard to rheumatoid arthritis.  Chronic illness, stable.    - Continue leflunomide 20 mg every other day  - Labs in 3 months: CBC, Creatinine, Hepatic Panel  - Labs in 6 months: CBC, Creatinine, Hepatic Panel, ESR, CRP     High risk medication requiring intensive toxicity monitoring at least quarterly: labs ordered include CBC, Creatinine, Hepatic panel to monitor for cytopenia and hepatotoxicity; checking creatinine as it affects clearance of medication.     - COVID-19: has received the Pfizer COVID-19 vaccine on 3/16/21 and 4/6/2021 and 9/24/2021. A 4th dose of the mRNA COVID-19 vaccination is recommended to be received 5 months after the third mRNA COVID-19 vaccination was received. Hold leflunomide for 1 week after COVID-19 vaccination.    Total minutes spent in evaluation with patient, documentation, , and review of pertinent studies and chart notes: 16      Mr. Monroy verbalized agreement with and understanding of the rational for the diagnosis and treatment plan.  All questions were answered to best of my ability and the patient's satisfaction. Mr. Monroy was advised to contact the clinic with any questions that may arise after the clinic visit.      Thank you for involving me in the care of the  patient    Return to clinic: 6 months     HPI   Bora Monroy is a 61 year old male hyperlipidemia, and seropositive rheumatoid arthritis who is here for follow-up of RA.     Today, 2/15/2022: doing well. No joint pain or swelling. No morning stiffness or gelling.  Tolerating leflunomide 20mg every other day. Arthritis is not limiting any of his daily activities.     Denies fevers, chills, nausea, vomiting, constipation, diarrhea. No abdominal pain. No chest pain/pressure, palpitations, or shortness of breath. No oral or nasal sores. No neck pain. No rash. No LE swelling.      ROS   12 point review of system was completed and negative except as noted in the HPI     Active Problem List     Patient Active Problem List   Diagnosis     Presbyopias     Hyperopia     Hyperlipidemia with target LDL less than 130     Impaired fasting glucose     High risk medications (not anticoagulants) long-term use     Advanced directives, counseling/discussion     Seropositive rheumatoid arthritis of multiple sites (HCC)     Hypertension goal BP (blood pressure) < 140/90     Kidney stone     Past Medical History     Past Medical History:   Diagnosis Date     Hyperlipidemia LDL goal < 130     declines statins -- favorable CAC in 2/18     Hypertension goal BP (blood pressure) < 140/90 11/17/2015    NL stress test 4/1/16, med started 11/16     Impaired fasting glucose      Kidney stone 12/8/2016     Seropositive rheumatoid arthritis of multiple sites (H) 11/17/2015     Past Surgical History     Past Surgical History:   Procedure Laterality Date     COLONOSCOPY  11/5/2010    COLONOSCOPY performed by FERMIN MCCLELLAND at [facility]     COLONOSCOPY N/A 1/25/2021    Procedure: COLONOSCOPY;  Surgeon: Anderson Heredia MD;  Location: [facility]     TONSILLECTOMY  1964    AGE 4     Allergy     Allergies   Allergen Reactions     Azithromycin Diarrhea     Current Medication List     Current Outpatient Medications   Medication Sig     amLODIPine (NORVASC)  "5 MG tablet Take 1 tablet (5 mg) by mouth daily     Cholecalciferol (VITAMIN D PO) Take 1,000 Units by mouth     cinnamon 500 MG TABS Take 1 tablet by mouth daily      FISH OIL 1000 MG OR CAPS 1 tablet daily     glucosamine-chondroitin 500-400 MG CAPS per capsule Take 1 capsule by mouth daily     KRILL OIL PO Take 1 tablet by mouth daily     leflunomide (ARAVA) 20 MG tablet Take 1 tablet (20 mg) by mouth every other day .  Labs required every 8-12 weeks     losartan (COZAAR) 50 MG tablet Take 1 tablet (50 mg) by mouth daily     Misc Natural Products (TURMERIC CURCUMIN) CAPS Take by mouth daily     MULTI-VITAMIN OR TABS 1 TABLET DAILY     psyllium (METAMUCIL) 30.9 % POWD Take 1 tsp by mouth 2 times daily.     triamcinolone (KENALOG) 0.1 % external cream Apply sparingly to affected area two to three times daily as needed for hand rash     VITAMIN C OR None Entered     VITAMIN E PO      No current facility-administered medications for this visit.     Social History     See HPI    Family History     Family History   Problem Relation Age of Onset     Lipids Sister      Diabetes Sister      Lipids Father      Hypertension Father      Cerebrovascular Disease Maternal Aunt      Cancer No family hx of        Physical Exam     Temp Readings from Last 3 Encounters:   12/08/21 97.8  F (36.6  C) (Oral)   09/24/21 98.4  F (36.9  C) (Tympanic)   05/20/21 97.9  F (36.6  C) (Tympanic)     BP Readings from Last 5 Encounters:   12/08/21 136/69   10/07/21 126/75   09/24/21 117/71   08/16/21 124/71   05/20/21 120/72     Pulse Readings from Last 1 Encounters:   12/08/21 61     Resp Readings from Last 1 Encounters:   09/24/21 14     Estimated body mass index is 23.35 kg/m  as calculated from the following:    Height as of 10/7/21: 1.803 m (5' 11\").    Weight as of 12/8/21: 75.9 kg (167 lb 6.4 oz).      GEN: NAD. Healthy appearing adult.   HEENT: MMM.  Anicteric, noninjected sclera. No obvious external lesions of the ear and nose. Hearing " intact.  PULM: No increased work of breathing  MSK:  Hands and wrists without swelling.   SKIN: No rash or jaundice seen  PSYCH: Alert. Appropriate.        Labs     CBC  Recent Labs   Lab Test 02/08/22  1513 11/26/21  0753 08/10/21  1457 03/22/21  1454 01/21/21  1537 11/30/20  0844 08/19/20  1513   WBC 5.0 4.6 5.5 5.3   < > 4.3 5.3   RBC 4.17* 4.03* 4.10* 3.91*   < > 4.57 4.37*   HGB 13.6 13.4 13.7 13.1*   < > 15.0 14.4   HCT 39.4* 38.9* 40.0 37.9*   < > 42.7 40.8   MCV 95 97 98 97   < > 93 93   RDW 11.4 11.6 11.6 11.6   < > 11.7 11.6    241 229 227   < > 192 205   MCH 32.6 33.3* 33.4* 33.5*   < > 32.8 33.0   MCHC 34.5 34.4 34.3 34.6   < > 35.1 35.3   NEUTROPHIL 61 57 61 59.3  --  55.2 57.6   LYMPH 30 30 30 29.9  --  34.3 32.3   MONOCYTE 8 10 8 9.3  --  8.8 8.8   EOSINOPHIL 1 2 1 1.1  --  1.2 0.9   BASOPHIL 0 0 0 0.4  --  0.5 0.4   ANEU  --   --   --  3.1  --  2.4 3.1   ALYM  --   --   --  1.6  --  1.5 1.7   MANDI  --   --   --  0.5  --  0.4 0.5   AEOS  --   --   --  0.1  --  0.1 0.1   ABAS  --   --   --  0.0  --  0.0 0.0   ANEUTAUTO 3.0 2.6 3.4  --   --   --   --    ALYMPAUTO 1.5 1.4 1.7  --   --   --   --    AMONOAUTO 0.4 0.5 0.4  --   --   --   --    AEOSAUTO 0.1 0.1 0.1  --   --   --   --    ABSBASO 0.0 0.0 0.0  --   --   --   --     < > = values in this interval not displayed.     CMP  Recent Labs   Lab Test 02/08/22  1513 11/26/21  0753 08/10/21  1457 03/22/21  1454 01/21/21  1537 11/30/20  0844   NA  --  132*  --   --  136 138   POTASSIUM  --  4.2  --   --  4.4 3.9   CHLORIDE  --  100  --   --  102 104   CO2  --  30  --   --  30 29   ANIONGAP  --  2*  --   --  4 5   GLC  --  109*  --   --  98 108*   BUN  --  14  --   --  12 12   CR 0.94 0.86 1.09 1.02 0.86 0.80  0.83   GFRESTIMATED >90 >90 73 79 >90 >90  >90   GFRESTBLACK  --   --   --  >90 >90 >90  >90   MOLINA  --  8.7  --   --  9.1 8.8   BILITOTAL 0.4 0.5 0.5 0.6 0.7 0.5   ALBUMIN 4.0 3.9 4.3 4.3 4.4 4.2   PROTTOTAL 6.7* 6.8 7.2 6.8 7.2 7.0   ALKPHOS  84 76 77 77 85 74   AST 22 19 20 12 23 17   ALT 28 31 31 25 32 32     Calcium/VitaminD  Recent Labs   Lab Test 11/26/21  0753 01/21/21  1537 11/30/20  0844 11/17/15  0746 10/12/15  1439   MOLINA 8.7 9.1 8.8   < >  --    VITDT  --   --   --   --  46    < > = values in this interval not displayed.     ESR/CRP  Recent Labs   Lab Test 02/08/22  1513 11/26/21  0753 08/10/21  1457   SED 5 6 4   CRP <2.9 <2.9 <2.9     HIV Screening  Recent Labs   Lab Test 10/16/18  0757   HIAGAB Nonreactive     Immunization History     Immunization History   Administered Date(s) Administered     COVID-19,PF,Pfizer (12+ Yrs) 03/16/2021, 04/06/2021, 09/24/2021     Influenza (IIV3) PF 10/29/2007, 10/13/2012, 10/15/2013     Influenza Quad, Recombinant, pf(RIV4) (Flublok) 09/24/2021     Influenza Vaccine IM > 6 months Valent IIV4 (Alfuria,Fluzone) 10/30/2014, 10/29/2015, 10/15/2016, 10/13/2017, 10/15/2018, 10/15/2019     Pneumococcal 23 valent 10/31/2002     TD (ADULT, 7+) 09/03/2004     TDAP Vaccine (Boostrix) 11/13/2014          Chart documentation done in part with Dragon Voice recognition Software. Although reviewed after completion, some word and grammatical error may remain.      Video-Visit Details    Type of service:  Video Visit    Video Start Time: 3:15 PM    Video End Time: 3:27 PM    Originating Location (pt. Location): [place], [place]    Distant Location (provider location):  [place]    Platform used for Video Visit: Monisha Mazariegos MD

## 2022-02-18 ENCOUNTER — TELEPHONE (OUTPATIENT)
Dept: FAMILY MEDICINE | Facility: CLINIC | Age: 62
End: 2022-02-18
Payer: COMMERCIAL

## 2022-02-18 ENCOUNTER — NURSE TRIAGE (OUTPATIENT)
Dept: FAMILY MEDICINE | Facility: CLINIC | Age: 62
End: 2022-02-18
Payer: COMMERCIAL

## 2022-02-18 DIAGNOSIS — K64.9 HEMORRHOIDS, UNSPECIFIED HEMORRHOID TYPE: Primary | ICD-10-CM

## 2022-02-18 NOTE — TELEPHONE ENCOUNTER
Spoke with patient regarding this referral.  He was not pleased with this location, however stated the other locations (writer informed him of these) would not be any more convenient. He was provided with scheduling phone number. He is also going to check with a friend to see what specialist he saw and may call back to adjust referral.    STELLA Malhotra RN  [facility]

## 2022-02-18 NOTE — TELEPHONE ENCOUNTER
I would recommend seeing one of the [facility] colorectal providers.  I placed a referral for that.  He can call them to make an appointment at his convenience.    Yakov Jones MD

## 2022-02-18 NOTE — TELEPHONE ENCOUNTER
Reason for Call:  Change in referral for Hemorrhoid issue    Detailed comments: Patient would like the referral closer and would like the referral sent to [facility] in [place], address is [address] [place] [address].  [address] and zip code [address]. Phone number [phone].  Fax [phone]    Phone Number Patient can be reached at: Home number on file [phone] (home)    Best Time: today anytime or after 3 pm any other    Can we leave a detailed message on this number? YES    Call taken on 2/18/2022 at 1:20 PM by Tena Rolle

## 2022-02-18 NOTE — TELEPHONE ENCOUNTER
Called and informed pt new referral was made. Faxed referral to [phone] and [phone].    Taryn Bryant MA on 2/18/2022 at 3:51 PM

## 2022-02-18 NOTE — TELEPHONE ENCOUNTER
Pt calling. States on 2/12 he developed hemorrhoids. Just today had a little bit of blood this am after a BM. No rectal pain. States he had some mild rectal pain and took ibuprofen for this during the week, then it resolved. Sometimes doesn't feel anything at all. Has not taken any ibuprofen today. Has also been using metamucil twice a day and eating a high fiber diet. He took a warm bath and that soothed it and stopped the bleeding.  No abdominal pain. No vomiting. No fever. No dark or black stools. No straining with BM. Has been using prep H and this has helped. Pt is requesting a referral to a proctologist. He wants to see someone Dr. Jones knows that he trusts. Can call at home number to further discuss if needed.    Reason for Disposition    MILD rectal bleeding (more than just a few drops or streaks)    Additional Information    Negative: Passed out (i.e., fainted, collapsed and was not responding)    Negative: Shock suspected (e.g., cold/pale/clammy skin, too weak to stand, low BP, rapid pulse)    Negative: Vomiting red blood or black (coffee ground) material    Negative: Sounds like a life-threatening emergency to the triager    Negative: Diarrhea is the main symptom    Negative: Rectal symptoms    Negative: SEVERE rectal bleeding (large blood clots; on and off, or constant bleeding)    Negative: SEVERE dizziness (e.g., unable to stand, requires support to walk, feels like passing out now)    Negative: MODERATE rectal bleeding (small blood clots, passing blood without stool, or toilet water turns red) more than once a day    Negative: Bloody, black, or tarry bowel movements (Exception: chronic-unchanged  black-grey bowel movements and is taking iron pills or Pepto-bismol)    Negative: High-risk adult (e.g., prior surgery on aorta, abdominal aortic aneurysm)    Negative: Rectal foreign body (inserted or swallowed)    Negative: SEVERE abdominal pain (e.g., excruciating)    Negative: Constant abdominal pain  lasting > 2 hours    Negative: Pale skin (pallor) of new onset or worsening    Negative: Patient sounds very sick or weak to the triager    Negative: MODERATE rectal bleeding (small blood clots, passing blood without stool, or toilet water turns red)    Negative: Taking Coumadin (warfarin) or other strong blood thinner, or known bleeding disorder (e.g., thrombocytopenia)    Negative: Colonoscopy in past 72 hours    Negative: Known cirrhosis of the liver (or history of liver failure or ascites)    Negative: Patient wants to be seen    Protocols used: RECTAL BLEEDING-A-OH

## 2022-03-07 ENCOUNTER — TRANSFERRED RECORDS (OUTPATIENT)
Dept: HEALTH INFORMATION MANAGEMENT | Facility: CLINIC | Age: 62
End: 2022-03-07
Payer: COMMERCIAL

## 2022-06-08 ENCOUNTER — LAB (OUTPATIENT)
Dept: LAB | Facility: CLINIC | Age: 62
End: 2022-06-08
Payer: COMMERCIAL

## 2022-06-08 DIAGNOSIS — M05.79 SEROPOSITIVE RHEUMATOID ARTHRITIS OF MULTIPLE SITES (H): ICD-10-CM

## 2022-06-08 DIAGNOSIS — Z79.899 HIGH RISK MEDICATION USE: ICD-10-CM

## 2022-06-08 LAB
ALBUMIN SERPL-MCNC: 3.9 G/DL (ref 3.4–5)
ALP SERPL-CCNC: 81 U/L (ref 40–150)
ALT SERPL W P-5'-P-CCNC: 29 U/L (ref 0–70)
AST SERPL W P-5'-P-CCNC: 19 U/L (ref 0–45)
BASOPHILS # BLD AUTO: 0 10E3/UL (ref 0–0.2)
BASOPHILS NFR BLD AUTO: 1 %
BILIRUB DIRECT SERPL-MCNC: <0.1 MG/DL (ref 0–0.2)
BILIRUB SERPL-MCNC: 0.4 MG/DL (ref 0.2–1.3)
CREAT SERPL-MCNC: 1.04 MG/DL (ref 0.66–1.25)
EOSINOPHIL # BLD AUTO: 0.1 10E3/UL (ref 0–0.7)
EOSINOPHIL NFR BLD AUTO: 1 %
ERYTHROCYTE [DISTWIDTH] IN BLOOD BY AUTOMATED COUNT: 11.9 % (ref 10–15)
GFR SERPL CREATININE-BSD FRML MDRD: 81 ML/MIN/1.73M2
HCT VFR BLD AUTO: 39.7 % (ref 40–53)
HGB BLD-MCNC: 13.7 G/DL (ref 13.3–17.7)
LYMPHOCYTES # BLD AUTO: 1.6 10E3/UL (ref 0.8–5.3)
LYMPHOCYTES NFR BLD AUTO: 29 %
MCH RBC QN AUTO: 33 PG (ref 26.5–33)
MCHC RBC AUTO-ENTMCNC: 34.5 G/DL (ref 31.5–36.5)
MCV RBC AUTO: 96 FL (ref 78–100)
MONOCYTES # BLD AUTO: 0.5 10E3/UL (ref 0–1.3)
MONOCYTES NFR BLD AUTO: 8 %
NEUTROPHILS # BLD AUTO: 3.5 10E3/UL (ref 1.6–8.3)
NEUTROPHILS NFR BLD AUTO: 61 %
PLATELET # BLD AUTO: 248 10E3/UL (ref 150–450)
PROT SERPL-MCNC: 6.5 G/DL (ref 6.8–8.8)
RBC # BLD AUTO: 4.15 10E6/UL (ref 4.4–5.9)
WBC # BLD AUTO: 5.6 10E3/UL (ref 4–11)

## 2022-06-08 PROCEDURE — 80076 HEPATIC FUNCTION PANEL: CPT

## 2022-06-08 PROCEDURE — 82565 ASSAY OF CREATININE: CPT

## 2022-06-08 PROCEDURE — 85025 COMPLETE CBC W/AUTO DIFF WBC: CPT

## 2022-06-08 PROCEDURE — 36415 COLL VENOUS BLD VENIPUNCTURE: CPT

## 2022-09-08 ENCOUNTER — LAB (OUTPATIENT)
Dept: LAB | Facility: CLINIC | Age: 62
End: 2022-09-08
Payer: COMMERCIAL

## 2022-09-08 DIAGNOSIS — M05.79 SEROPOSITIVE RHEUMATOID ARTHRITIS OF MULTIPLE SITES (H): ICD-10-CM

## 2022-09-08 DIAGNOSIS — Z79.899 HIGH RISK MEDICATION USE: ICD-10-CM

## 2022-09-08 LAB
ALBUMIN SERPL-MCNC: 4 G/DL (ref 3.4–5)
ALP SERPL-CCNC: 73 U/L (ref 40–150)
ALT SERPL W P-5'-P-CCNC: 30 U/L (ref 0–70)
AST SERPL W P-5'-P-CCNC: 20 U/L (ref 0–45)
BASOPHILS # BLD AUTO: 0 10E3/UL (ref 0–0.2)
BASOPHILS NFR BLD AUTO: 0 %
BILIRUB DIRECT SERPL-MCNC: 0.1 MG/DL (ref 0–0.2)
BILIRUB SERPL-MCNC: 0.6 MG/DL (ref 0.2–1.3)
CREAT SERPL-MCNC: 1.14 MG/DL (ref 0.66–1.25)
CRP SERPL-MCNC: <2.9 MG/L (ref 0–8)
EOSINOPHIL # BLD AUTO: 0.1 10E3/UL (ref 0–0.7)
EOSINOPHIL NFR BLD AUTO: 1 %
ERYTHROCYTE [DISTWIDTH] IN BLOOD BY AUTOMATED COUNT: 11.5 % (ref 10–15)
ERYTHROCYTE [SEDIMENTATION RATE] IN BLOOD BY WESTERGREN METHOD: 4 MM/HR (ref 0–20)
GFR SERPL CREATININE-BSD FRML MDRD: 73 ML/MIN/1.73M2
HCT VFR BLD AUTO: 39.7 % (ref 40–53)
HGB BLD-MCNC: 13.9 G/DL (ref 13.3–17.7)
LYMPHOCYTES # BLD AUTO: 1.6 10E3/UL (ref 0.8–5.3)
LYMPHOCYTES NFR BLD AUTO: 32 %
MCH RBC QN AUTO: 33.3 PG (ref 26.5–33)
MCHC RBC AUTO-ENTMCNC: 35 G/DL (ref 31.5–36.5)
MCV RBC AUTO: 95 FL (ref 78–100)
MONOCYTES # BLD AUTO: 0.5 10E3/UL (ref 0–1.3)
MONOCYTES NFR BLD AUTO: 9 %
NEUTROPHILS # BLD AUTO: 2.9 10E3/UL (ref 1.6–8.3)
NEUTROPHILS NFR BLD AUTO: 58 %
PLATELET # BLD AUTO: 235 10E3/UL (ref 150–450)
PROT SERPL-MCNC: 6.6 G/DL (ref 6.8–8.8)
RBC # BLD AUTO: 4.18 10E6/UL (ref 4.4–5.9)
WBC # BLD AUTO: 5.1 10E3/UL (ref 4–11)

## 2022-09-08 PROCEDURE — 86140 C-REACTIVE PROTEIN: CPT

## 2022-09-08 PROCEDURE — 82565 ASSAY OF CREATININE: CPT

## 2022-09-08 PROCEDURE — 85025 COMPLETE CBC W/AUTO DIFF WBC: CPT

## 2022-09-08 PROCEDURE — 85652 RBC SED RATE AUTOMATED: CPT

## 2022-09-08 PROCEDURE — 36415 COLL VENOUS BLD VENIPUNCTURE: CPT

## 2022-09-08 PROCEDURE — 80076 HEPATIC FUNCTION PANEL: CPT

## 2022-10-10 ENCOUNTER — HEALTH MAINTENANCE LETTER (OUTPATIENT)
Age: 62
End: 2022-10-10

## 2022-11-01 ENCOUNTER — VIRTUAL VISIT (OUTPATIENT)
Dept: RHEUMATOLOGY | Facility: CLINIC | Age: 62
End: 2022-11-01
Payer: COMMERCIAL

## 2022-11-01 DIAGNOSIS — M77.00 MEDIAL EPICONDYLITIS OF ELBOW, UNSPECIFIED LATERALITY: ICD-10-CM

## 2022-11-01 DIAGNOSIS — Z79.899 HIGH RISK MEDICATION USE: ICD-10-CM

## 2022-11-01 DIAGNOSIS — M05.79 SEROPOSITIVE RHEUMATOID ARTHRITIS OF MULTIPLE SITES (H): Primary | ICD-10-CM

## 2022-11-01 PROCEDURE — 99214 OFFICE O/P EST MOD 30 MIN: CPT | Mod: 95 | Performed by: INTERNAL MEDICINE

## 2022-11-01 RX ORDER — LEFLUNOMIDE 20 MG/1
20 TABLET ORAL EVERY OTHER DAY
Qty: 45 TABLET | Refills: 2 | Status: SHIPPED | OUTPATIENT
Start: 2022-11-01 | End: 2023-06-07

## 2022-11-01 NOTE — PROGRESS NOTES
Bora Monroy  is a 62 year old year old who is being evaluated via a billable video visit.      How would you like to obtain your AVS? MyChart  If the video visit is dropped, the invitation should be resent by: Text to cell phone: [phone]  Will anyone else be joining your video visit? No     Rheumatology Video Visit      Bora Monroy MRN# [id number]   YOB: 1960 Age: 62 year old      Date of visit: 11/01/22   PCP: Dr. Yakov Jones     Chief Complaint     Patient presents with:  Rheumatoid Arthritis    Assessment and Plan     1. Seropositive rheumatoid arthritis (RF negative, CCP positive): Currently on leflunomide 20 mg every other day.  Doing well at this time with regard to rheumatoid arthritis.  Chronic illness, stable.    - Continue leflunomide 20 mg every other day  - Labs in early December: CBC, creatinine, hepatic panel, hepatitis B core antibody and surface antigen, hepatitis C antibody  - Labs in March: CBC, Creatinine, Hepatic Panel  - Labs in June: CBC, Creatinine, Hepatic Panel, ESR, CRP     High risk medication requiring intensive toxicity monitoring at least quarterly: labs ordered include CBC, Creatinine, Hepatic panel to monitor for cytopenia and hepatotoxicity; checking creatinine as it affects clearance of medication.     2.  Unilateral medial epicondylitis: Duration of less than 1 week.  Advised trying topical Voltaren gel as needed.  Advised icing area.  Topical Voltaren gel is ineffective then to try using oral NSAIDs such as ibuprofen.  Avoid aggravating activities.  If not improving after 1 month that he should be evaluated in office.    3.  Vaccinations: Vaccinations reviewed with Mr. Monroy.  Risks and benefits of vaccinations were discussed.    - Influenza: encouraged yearly vaccination   - Shingrix: Advised receiving  - COVID-19: Up to date    Total minutes spent in evaluation with patient, documentation, , and review of pertinent studies and chart  notes: 19      Mr. Monroy verbalized agreement with and understanding of the rational for the diagnosis and treatment plan.  All questions were answered to best of my ability and the patient's satisfaction. Mr. Monroy was advised to contact the clinic with any questions that may arise after the clinic visit.      Thank you for involving me in the care of the patient    Return to clinic: June 2023    [facility]   Bora Monroy is a 62 year old male hyperlipidemia, and seropositive rheumatoid arthritis who is here for follow-up of RA.     Today, 11/1/2022: Currently doing well.  The only issue he has at this time is unilateral medial epicondylitis for the past several days; he is icing this area and resting it after work.  Other joints are doing well.  No issues with leflunomide.  Morning stiffness for no more than 20 minutes.  No gelling phenomenon.    Denies fevers, chills, nausea, vomiting, constipation, diarrhea. No abdominal pain. No chest pain/pressure, palpitations, or shortness of breath. No oral or nasal sores. No neck pain. No rash. No LE swelling.      ROS   12 point review of system was completed and negative except as noted in the HPI     Active Problem List     Patient Active Problem List   Diagnosis     Presbyopias     Hyperopia     Hyperlipidemia with target LDL less than 130     Impaired fasting glucose     High risk medications (not anticoagulants) long-term use     Advanced directives, counseling/discussion     Seropositive rheumatoid arthritis of multiple sites (HCC)     Hypertension goal BP (blood pressure) < 140/90     Kidney stone     Past Medical History     Past Medical History:   Diagnosis Date     Hyperlipidemia LDL goal < 130     declines statins -- favorable CAC in 2/18     Hypertension goal BP (blood pressure) < 140/90 11/17/2015    NL stress test 4/1/16, med started 11/16     Impaired fasting glucose      Kidney stone 12/8/2016     Seropositive rheumatoid arthritis of multiple sites (H)  11/17/2015     Past Surgical History     Past Surgical History:   Procedure Laterality Date     COLONOSCOPY  11/5/2010    COLONOSCOPY performed by FERMIN MCCLELLAND at [facility]     COLONOSCOPY N/A 1/25/2021    Procedure: COLONOSCOPY;  Surgeon: Anderson Heredia MD;  Location: [facility]     TONSILLECTOMY  1964    AGE 4     Allergy     Allergies   Allergen Reactions     Azithromycin Diarrhea     Current Medication List     Current Outpatient Medications   Medication Sig     amLODIPine (NORVASC) 5 MG tablet Take 1 tablet (5 mg) by mouth daily     Cholecalciferol (VITAMIN D PO) Take 1,000 Units by mouth     cinnamon 500 MG TABS Take 1 tablet by mouth daily      FISH OIL 1000 MG OR CAPS 1 tablet daily     glucosamine-chondroitin 500-400 MG CAPS per capsule Take 1 capsule by mouth daily     KRILL OIL PO Take 1 tablet by mouth daily     leflunomide (ARAVA) 20 MG tablet Take 1 tablet (20 mg) by mouth every other day .  Labs required every 3 months.     losartan (COZAAR) 50 MG tablet Take 1 tablet (50 mg) by mouth daily     Misc Natural Products (TURMERIC CURCUMIN) CAPS Take by mouth daily     MULTI-VITAMIN OR TABS 1 TABLET DAILY     psyllium (METAMUCIL) 30.9 % POWD Take 1 tsp by mouth 2 times daily.     triamcinolone (KENALOG) 0.1 % external cream Apply sparingly to affected area two to three times daily as needed for hand rash     VITAMIN C OR None Entered     VITAMIN E PO      No current facility-administered medications for this visit.     Social History     See HPI    Family History     Family History   Problem Relation Age of Onset     Lipids Sister      Diabetes Sister      Lipids Father      Hypertension Father      Cerebrovascular Disease Maternal Aunt      Cancer No family hx of        Physical Exam     Temp Readings from Last 3 Encounters:   12/08/21 97.8  F (36.6  C) (Oral)   09/24/21 98.4  F (36.9  C) (Tympanic)   05/20/21 97.9  F (36.6  C) (Tympanic)     BP Readings from Last 5 Encounters:   12/08/21 136/69  "  10/07/21 126/75   09/24/21 117/71   08/16/21 124/71   05/20/21 120/72     Pulse Readings from Last 1 Encounters:   12/08/21 61     Resp Readings from Last 1 Encounters:   09/24/21 14     Estimated body mass index is 23.35 kg/m  as calculated from the following:    Height as of 10/7/21: 1.803 m (5' 11\").    Weight as of 12/8/21: 75.9 kg (167 lb 6.4 oz).      GEN: NAD. Healthy appearing adult.   HEENT:  Anicteric, noninjected sclera. No obvious external lesions of the ear and nose. Hearing intact.  PULM: No increased work of breathing  MSK:  Hands and wrists without swelling.   SKIN: No rash or jaundice seen  PSYCH: Alert. Appropriate.        Labs     CBC  Recent Labs   Lab Test 09/08/22  1502 06/08/22  1512 02/08/22  1513 08/10/21  1457 03/22/21  1454 01/21/21  1537 11/30/20  0844 08/19/20  1513   WBC 5.1 5.6 5.0   < > 5.3   < > 4.3 5.3   RBC 4.18* 4.15* 4.17*   < > 3.91*   < > 4.57 4.37*   HGB 13.9 13.7 13.6   < > 13.1*   < > 15.0 14.4   HCT 39.7* 39.7* 39.4*   < > 37.9*   < > 42.7 40.8   MCV 95 96 95   < > 97   < > 93 93   RDW 11.5 11.9 11.4   < > 11.6   < > 11.7 11.6    248 219   < > 227   < > 192 205   MCH 33.3* 33.0 32.6   < > 33.5*   < > 32.8 33.0   MCHC 35.0 34.5 34.5   < > 34.6   < > 35.1 35.3   NEUTROPHIL 58 61 61   < > 59.3  --  55.2 57.6   LYMPH 32 29 30   < > 29.9  --  34.3 32.3   MONOCYTE 9 8 8   < > 9.3  --  8.8 8.8   EOSINOPHIL 1 1 1   < > 1.1  --  1.2 0.9   BASOPHIL 0 1 0   < > 0.4  --  0.5 0.4   ANEU  --   --   --   --  3.1  --  2.4 3.1   ALYM  --   --   --   --  1.6  --  1.5 1.7   MANDI  --   --   --   --  0.5  --  0.4 0.5   AEOS  --   --   --   --  0.1  --  0.1 0.1   ABAS  --   --   --   --  0.0  --  0.0 0.0   ANEUTAUTO 2.9 3.5 3.0   < >  --   --   --   --    ALYMPAUTO 1.6 1.6 1.5   < >  --   --   --   --    AMONOAUTO 0.5 0.5 0.4   < >  --   --   --   --    AEOSAUTO 0.1 0.1 0.1   < >  --   --   --   --    ABSBASO 0.0 0.0 0.0   < >  --   --   --   --     < > = values in this interval not " displayed.     CMP  Recent Labs   Lab Test 09/08/22  1502 06/08/22  1512 02/08/22  1513 11/26/21  0753 08/10/21  1457 03/22/21  1454 01/21/21  1537 11/30/20  0844   NA  --   --   --  132*  --   --  136 138   POTASSIUM  --   --   --  4.2  --   --  4.4 3.9   CHLORIDE  --   --   --  100  --   --  102 104   CO2  --   --   --  30  --   --  30 29   ANIONGAP  --   --   --  2*  --   --  4 5   GLC  --   --   --  109*  --   --  98 108*   BUN  --   --   --  14  --   --  12 12   CR 1.14 1.04 0.94 0.86   < > 1.02 0.86 0.80  0.83   GFRESTIMATED 73 81 >90 >90   < > 79 >90 >90  >90   GFRESTBLACK  --   --   --   --   --  >90 >90 >90  >90   MOLINA  --   --   --  8.7  --   --  9.1 8.8   BILITOTAL 0.6 0.4 0.4 0.5   < > 0.6 0.7 0.5   ALBUMIN 4.0 3.9 4.0 3.9   < > 4.3 4.4 4.2   PROTTOTAL 6.6* 6.5* 6.7* 6.8   < > 6.8 7.2 7.0   ALKPHOS 73 81 84 76   < > 77 85 74   AST 20 19 22 19   < > 12 23 17   ALT 30 29 28 31   < > 25 32 32    < > = values in this interval not displayed.     Calcium/VitaminD  Recent Labs   Lab Test 11/26/21  0753 01/21/21  1537 11/30/20  0844 11/17/15  0746 10/12/15  1439   MOLINA 8.7 9.1 8.8   < >  --    VITDT  --   --   --   --  46    < > = values in this interval not displayed.     ESR/CRP  Recent Labs   Lab Test 09/08/22  1502 02/08/22  1513 11/26/21  0753   SED 4 5 6   CRP <2.9 <2.9 <2.9     HIV Screening  Recent Labs   Lab Test 10/16/18  0757   HIAGAB Nonreactive     Immunization History     Immunization History   Administered Date(s) Administered     COVID-19,PF,Moderna 04/01/2022     COVID-19,PF,Moderna BIVALENT Booster 10/25/2022     COVID-19,PF,Pfizer (12+ Yrs) 03/16/2021, 04/06/2021, 09/24/2021     Influenza (IIV3) PF 10/29/2007, 10/13/2012, 10/15/2013     Influenza Quad, Recombinant, pf(RIV4) (Flublok) 09/24/2021     Influenza Vaccine IM > 6 months Valent IIV4 (Alfuria,Fluzone) 10/30/2014, 10/29/2015, 10/15/2016, 10/13/2017, 10/15/2018, 10/15/2019, 11/11/2020     Pneumococcal 23 valent 10/31/2002     TD (ADULT,  7+) 09/03/2004     TDAP Vaccine (Boostrix) 11/13/2014          Chart documentation done in part with Dragon Voice recognition Software. Although reviewed after completion, some word and grammatical error may remain.      Video-Visit Details    Type of service:  Video Visit    Video Start Time: 7:27 AM    Video End Time: 7:40 AM    Originating Location (pt. Location):  [place], [place]    Distant Location (provider location):  off-site, [place]    Platform used for Video Visit: Abraham Mazariegos MD

## 2022-11-01 NOTE — PATIENT INSTRUCTIONS
RHEUMATOLOGY    Dr. Eduard Mazariegos    [facility]  [address] [place] [address]  [place] [address]  Phone number: [phone]  Fax number: [phone]    You may schedule your FLU shot by calling [phone] or if you would like to get your shot at a [place] pharmacy you may schedule online at [email or web address].    Thank you for choosing [facility]!    Shonna Kenny CMA Rheumatology

## 2022-12-09 ENCOUNTER — OFFICE VISIT (OUTPATIENT)
Dept: FAMILY MEDICINE | Facility: CLINIC | Age: 62
End: 2022-12-09
Payer: COMMERCIAL

## 2022-12-09 VITALS
TEMPERATURE: 97.9 F | OXYGEN SATURATION: 98 % | SYSTOLIC BLOOD PRESSURE: 133 MMHG | BODY MASS INDEX: 24.08 KG/M2 | HEIGHT: 71 IN | HEART RATE: 67 BPM | DIASTOLIC BLOOD PRESSURE: 66 MMHG | WEIGHT: 172 LBS

## 2022-12-09 DIAGNOSIS — M05.79 SEROPOSITIVE RHEUMATOID ARTHRITIS OF MULTIPLE SITES (H): ICD-10-CM

## 2022-12-09 DIAGNOSIS — I10 HYPERTENSION GOAL BP (BLOOD PRESSURE) < 140/90: ICD-10-CM

## 2022-12-09 DIAGNOSIS — R73.01 IMPAIRED FASTING GLUCOSE: ICD-10-CM

## 2022-12-09 DIAGNOSIS — Z23 NEED FOR SHINGLES VACCINE: ICD-10-CM

## 2022-12-09 DIAGNOSIS — Z00.00 ROUTINE GENERAL MEDICAL EXAMINATION AT A HEALTH CARE FACILITY: Primary | ICD-10-CM

## 2022-12-09 DIAGNOSIS — M25.521 RIGHT ELBOW PAIN: ICD-10-CM

## 2022-12-09 DIAGNOSIS — E78.5 HYPERLIPIDEMIA WITH TARGET LDL LESS THAN 130: ICD-10-CM

## 2022-12-09 DIAGNOSIS — M25.512 LEFT SHOULDER PAIN, UNSPECIFIED CHRONICITY: ICD-10-CM

## 2022-12-09 PROCEDURE — 99396 PREV VISIT EST AGE 40-64: CPT | Mod: 25 | Performed by: FAMILY MEDICINE

## 2022-12-09 PROCEDURE — 99213 OFFICE O/P EST LOW 20 MIN: CPT | Mod: 25 | Performed by: FAMILY MEDICINE

## 2022-12-09 PROCEDURE — 90471 IMMUNIZATION ADMIN: CPT | Performed by: FAMILY MEDICINE

## 2022-12-09 PROCEDURE — 90750 HZV VACC RECOMBINANT IM: CPT | Performed by: FAMILY MEDICINE

## 2022-12-09 RX ORDER — AMLODIPINE BESYLATE 5 MG/1
TABLET ORAL
Qty: 90 TABLET | Refills: 0 | OUTPATIENT
Start: 2022-12-09

## 2022-12-09 RX ORDER — LOSARTAN POTASSIUM 50 MG/1
50 TABLET ORAL DAILY
Qty: 90 TABLET | Refills: 3 | Status: SHIPPED | OUTPATIENT
Start: 2022-12-09 | End: 2023-12-11

## 2022-12-09 RX ORDER — LOSARTAN POTASSIUM 50 MG/1
50 TABLET ORAL DAILY
Qty: 90 TABLET | Refills: 0 | OUTPATIENT
Start: 2022-12-09

## 2022-12-09 RX ORDER — AMLODIPINE BESYLATE 5 MG/1
5 TABLET ORAL DAILY
Qty: 90 TABLET | Refills: 3 | Status: SHIPPED | OUTPATIENT
Start: 2022-12-09 | End: 2023-12-11

## 2022-12-09 ASSESSMENT — ENCOUNTER SYMPTOMS
SORE THROAT: 0
FREQUENCY: 0
ABDOMINAL PAIN: 0
NAUSEA: 0
MYALGIAS: 0
FEVER: 0
WEAKNESS: 0
PALPITATIONS: 0
EYE PAIN: 0
PARESTHESIAS: 0
HEMATOCHEZIA: 0
DYSURIA: 0
NERVOUS/ANXIOUS: 0
JOINT SWELLING: 0
DIZZINESS: 0
SHORTNESS OF BREATH: 0
HEADACHES: 0
HEMATURIA: 0
DIARRHEA: 0
COUGH: 0
CHILLS: 0
HEARTBURN: 0
ARTHRALGIAS: 0
CONSTIPATION: 0

## 2022-12-09 ASSESSMENT — PAIN SCALES - GENERAL: PAINLEVEL: NO PAIN (0)

## 2022-12-09 NOTE — PROGRESS NOTES
SUBJECTIVE:   CC: Jim is an 62 year old who presents for a preventative health visit and follow-up on some baseline health conditions.   Patient has been advised of split billing requirements and indicates understanding: Yes  Healthy Habits:     Getting at least 3 servings of Calcium per day:  Yes    Bi-annual eye exam:  Yes    Dental care twice a year:  Yes    Sleep apnea or symptoms of sleep apnea:  None    Diet:  Low salt    Frequency of exercise:  4-5 days/week    Duration of exercise:  15-30 minutes    Taking medications regularly:  Yes    Barriers to taking medications:  None    PHQ-2 Total Score: 0    Additional concerns today:  No    He remains on amlodipine and losartan for hypertension.  He is on leflunomide for rheumatoid arthritis and sees rheumatology for that.  He gets regular lab work done from them.  He will be coming in next week for lab work and would like me to add my lab tests to that lab draw.  He has generally been feeling well, although he has had some right elbow pain recently as well as some left shoulder pain.  Left shoulder pain started after he did some leaf raking.  He requests a referral to physical therapy for these conditions so that he can get instructed on a home exercise program.  He has done that in the past and had good success with it.    Today's PHQ-2 Score:   PHQ-2 ( 1999 Pfizer) 12/9/2022   Q1: Little interest or pleasure in doing things 0   Q2: Feeling down, depressed or hopeless 0   PHQ-2 Score 0   PHQ-2 Total Score (12-17 Years)- Positive if 3 or more points; Administer PHQ-A if positive -   Q1: Little interest or pleasure in doing things Not at all   Q2: Feeling down, depressed or hopeless Not at all   PHQ-2 Score 0           Social History     Tobacco Use     Smoking status: Never     Smokeless tobacco: Never   Substance Use Topics     Alcohol use: Yes     Comment: a few drinks on the weekend     If you drink alcohol do you typically have >3 drinks per day or >7 drinks  per week? No    Alcohol Use 12/9/2022   Prescreen: >3 drinks/day or >7 drinks/week? No   Prescreen: >3 drinks/day or >7 drinks/week? -       Last PSA:   PSA   Date Value Ref Range Status   11/30/2020 1.45 0 - 4 ug/L Final     Comment:     Assay Method:  Chemiluminescence using Siemens Vista analyzer       Reviewed orders with patient. Reviewed health maintenance and updated orders accordingly - Yes  Patient Active Problem List   Diagnosis     Presbyopias     Hyperopia     Hyperlipidemia with target LDL less than 130     Impaired fasting glucose     High risk medications (not anticoagulants) long-term use     Advanced directives, counseling/discussion     Seropositive rheumatoid arthritis of multiple sites (HCC)     Hypertension goal BP (blood pressure) < 140/90     Kidney stone     Past Surgical History:   Procedure Laterality Date     COLONOSCOPY  11/5/2010    COLONOSCOPY performed by FERMIN MCCLELLAND at [facility]     COLONOSCOPY N/A 1/25/2021    Procedure: COLONOSCOPY;  Surgeon: Anderson Heredia MD;  Location: [facility]     TONSILLECTOMY  1964    AGE 4       Social History     Tobacco Use     Smoking status: Never     Smokeless tobacco: Never   Substance Use Topics     Alcohol use: Yes     Comment: a few drinks on the weekend     Family History   Problem Relation Age of Onset     Lipids Sister      Diabetes Sister      Lipids Father      Hypertension Father      Cerebrovascular Disease Maternal Aunt      Cancer No family hx of          Current Outpatient Medications   Medication Sig Dispense Refill     amLODIPine (NORVASC) 5 MG tablet Take 1 tablet (5 mg) by mouth daily 90 tablet 3     Cholecalciferol (VITAMIN D PO) Take 1,000 Units by mouth       cinnamon 500 MG TABS Take 1 tablet by mouth daily        FISH OIL 1000 MG OR CAPS 1 tablet daily       glucosamine-chondroitin 500-400 MG CAPS per capsule Take 1 capsule by mouth daily       KRILL OIL PO Take 1 tablet by mouth daily       leflunomide (ARAVA) 20 MG tablet Take  "1 tablet (20 mg) by mouth every other day .  Labs required every 3 months. 45 tablet 2     losartan (COZAAR) 50 MG tablet Take 1 tablet (50 mg) by mouth daily 90 tablet 3     Misc Natural Products (TURMERIC CURCUMIN) CAPS Take by mouth daily       MULTI-VITAMIN OR TABS 1 TABLET DAILY       psyllium (METAMUCIL) 30.9 % POWD Take 1 tsp by mouth 2 times daily.       triamcinolone (KENALOG) 0.1 % external cream Apply sparingly to affected area two to three times daily as needed for hand rash 60 g 2     VITAMIN C OR None Entered       VITAMIN E PO        Allergies   Allergen Reactions     Azithromycin Diarrhea       Reviewed and updated as needed this visit by clinical staff    Allergies               Reviewed and updated as needed this visit by Provider                     Review of Systems   Constitutional: Negative for chills and fever.   HENT: Negative for congestion, ear pain, hearing loss and sore throat.    Eyes: Negative for pain and visual disturbance.   Respiratory: Negative for cough and shortness of breath.    Cardiovascular: Negative for chest pain, palpitations and peripheral edema.   Gastrointestinal: Negative for abdominal pain, constipation, diarrhea, heartburn, hematochezia and nausea.   Genitourinary: Negative for dysuria, frequency, genital sores, hematuria, impotence, penile discharge and urgency.   Musculoskeletal: Negative for arthralgias, joint swelling and myalgias.   Skin: Negative for rash.   Neurological: Negative for dizziness, weakness, headaches and paresthesias.   Psychiatric/Behavioral: Negative for mood changes. The patient is not nervous/anxious.          OBJECTIVE:   /66 (BP Location: Right arm, Patient Position: Sitting, Cuff Size: Adult Regular)   Pulse 67   Temp 97.9  F (36.6  C) (Oral)   Ht 1.803 m (5' 11\")   Wt 78 kg (172 lb)   SpO2 98%   BMI 23.99 kg/m      Physical Exam  GENERAL: healthy, alert and no distress  EYES: Eyes grossly normal to inspection, PERRL and " conjunctivae and sclerae normal  HENT: Grossly normal  NECK: no adenopathy, no asymmetry, masses, or scars and thyroid normal to palpation  RESP: lungs clear to auscultation - no rales, rhonchi or wheezes  CV: regular rate and rhythm, normal S1 S2, no S3 or S4, no murmur, click or rub, no peripheral edema and peripheral pulses strong  ABDOMEN: soft, nontender, no hepatosplenomegaly, no masses   MS: He has some pain to palpation over the right elbow medial epicondyle and mild discomfort over the left anterior shoulder.  He has pain in the left shoulder when he reaches his left arm behind his back, but really not much pain when he raises his left arm up overhead.  SKIN: no suspicious lesions or rashes  NEURO: Normal strength and tone, mentation intact and speech normal  PSYCH: mentation appears normal, affect normal/bright    Diagnostic Test Results:  Labs reviewed in Epic    ASSESSMENT/PLAN:       ICD-10-CM    1. Routine general medical examination at a health care facility  Z00.00 Prostate Specific Antigen Screen      2. Hypertension goal BP (blood pressure) < 140/90  I10 amLODIPine (NORVASC) 5 MG tablet     losartan (COZAAR) 50 MG tablet     BASIC METABOLIC PANEL      3. Hyperlipidemia with target LDL less than 130  E78.5 Lipid panel reflex to direct LDL Fasting      4. Impaired fasting glucose  R73.01 Hemoglobin A1c      5. Seropositive rheumatoid arthritis of multiple sites (HCC)  M05.79       6. Right elbow pain  M25.521 Physical Therapy Referral      7. Left shoulder pain, unspecified chronicity  M25.512 Physical Therapy Referral      8. Need for shingles vaccine  Z23         Blood pressure and other vital signs look good  We discussed the above items  We will have him return soon for fasting lab work as ordered above  Continue same baseline meds  Continue ongoing rheumatology care for the RA  I will refer him to physical therapy for instruction on stretching and strengthening exercises for his right elbow and  left shoulder  We will give him a Shingrix vaccine today  He was advised to return to a pharmacy in 2 to 6 months for a Shingrix booster  Plan a tentative recheck in 1 year, or sooner prn      COUNSELING:   Reviewed preventive health counseling, as reflected in patient instructions       Regular exercise       Healthy diet/nutrition       Immunizations    Vaccinated for: Zoster          He reports that he has never smoked. He has never used smokeless tobacco.        Yakov Jones MD  [facility]

## 2022-12-14 ENCOUNTER — LAB (OUTPATIENT)
Dept: LAB | Facility: CLINIC | Age: 62
End: 2022-12-14
Payer: COMMERCIAL

## 2022-12-14 DIAGNOSIS — I10 HYPERTENSION GOAL BP (BLOOD PRESSURE) < 140/90: ICD-10-CM

## 2022-12-14 DIAGNOSIS — M05.79 SEROPOSITIVE RHEUMATOID ARTHRITIS OF MULTIPLE SITES (H): ICD-10-CM

## 2022-12-14 DIAGNOSIS — Z00.00 ROUTINE GENERAL MEDICAL EXAMINATION AT A HEALTH CARE FACILITY: ICD-10-CM

## 2022-12-14 DIAGNOSIS — Z79.899 HIGH RISK MEDICATION USE: ICD-10-CM

## 2022-12-14 DIAGNOSIS — E78.5 HYPERLIPIDEMIA WITH TARGET LDL LESS THAN 130: ICD-10-CM

## 2022-12-14 DIAGNOSIS — R73.01 IMPAIRED FASTING GLUCOSE: ICD-10-CM

## 2022-12-14 LAB
ALBUMIN SERPL-MCNC: 3.8 G/DL (ref 3.4–5)
ALP SERPL-CCNC: 66 U/L (ref 40–150)
ALT SERPL W P-5'-P-CCNC: 30 U/L (ref 0–70)
ANION GAP SERPL CALCULATED.3IONS-SCNC: 4 MMOL/L (ref 3–14)
AST SERPL W P-5'-P-CCNC: 17 U/L (ref 0–45)
BASOPHILS # BLD AUTO: 0 10E3/UL (ref 0–0.2)
BASOPHILS NFR BLD AUTO: 0 %
BILIRUB DIRECT SERPL-MCNC: 0.1 MG/DL (ref 0–0.2)
BILIRUB SERPL-MCNC: 0.5 MG/DL (ref 0.2–1.3)
BUN SERPL-MCNC: 14 MG/DL (ref 7–30)
CALCIUM SERPL-MCNC: 8.9 MG/DL (ref 8.5–10.1)
CHLORIDE BLD-SCNC: 103 MMOL/L (ref 94–109)
CHOLEST SERPL-MCNC: 240 MG/DL
CO2 SERPL-SCNC: 30 MMOL/L (ref 20–32)
CREAT SERPL-MCNC: 0.88 MG/DL (ref 0.66–1.25)
EOSINOPHIL # BLD AUTO: 0.1 10E3/UL (ref 0–0.7)
EOSINOPHIL NFR BLD AUTO: 3 %
ERYTHROCYTE [DISTWIDTH] IN BLOOD BY AUTOMATED COUNT: 11.5 % (ref 10–15)
FASTING STATUS PATIENT QL REPORTED: YES
GFR SERPL CREATININE-BSD FRML MDRD: >90 ML/MIN/1.73M2
GLUCOSE BLD-MCNC: 112 MG/DL (ref 70–99)
HBA1C MFR BLD: 5.3 % (ref 0–5.6)
HBV CORE AB SERPL QL IA: NONREACTIVE
HBV SURFACE AG SERPL QL IA: NONREACTIVE
HCT VFR BLD AUTO: 40 % (ref 40–53)
HCV AB SERPL QL IA: NONREACTIVE
HDLC SERPL-MCNC: 49 MG/DL
HGB BLD-MCNC: 13.6 G/DL (ref 13.3–17.7)
LDLC SERPL CALC-MCNC: 173 MG/DL
LYMPHOCYTES # BLD AUTO: 1.3 10E3/UL (ref 0.8–5.3)
LYMPHOCYTES NFR BLD AUTO: 37 %
MCH RBC QN AUTO: 32.5 PG (ref 26.5–33)
MCHC RBC AUTO-ENTMCNC: 34 G/DL (ref 31.5–36.5)
MCV RBC AUTO: 96 FL (ref 78–100)
MONOCYTES # BLD AUTO: 0.4 10E3/UL (ref 0–1.3)
MONOCYTES NFR BLD AUTO: 12 %
NEUTROPHILS # BLD AUTO: 1.7 10E3/UL (ref 1.6–8.3)
NEUTROPHILS NFR BLD AUTO: 48 %
NONHDLC SERPL-MCNC: 191 MG/DL
PLATELET # BLD AUTO: 212 10E3/UL (ref 150–450)
POTASSIUM BLD-SCNC: 4.5 MMOL/L (ref 3.4–5.3)
PROT SERPL-MCNC: 6.7 G/DL (ref 6.8–8.8)
PSA SERPL-MCNC: 2.03 UG/L (ref 0–4)
RBC # BLD AUTO: 4.19 10E6/UL (ref 4.4–5.9)
SODIUM SERPL-SCNC: 137 MMOL/L (ref 133–144)
TRIGL SERPL-MCNC: 88 MG/DL
WBC # BLD AUTO: 3.5 10E3/UL (ref 4–11)

## 2022-12-14 PROCEDURE — G0103 PSA SCREENING: HCPCS

## 2022-12-14 PROCEDURE — 36415 COLL VENOUS BLD VENIPUNCTURE: CPT

## 2022-12-14 PROCEDURE — 83036 HEMOGLOBIN GLYCOSYLATED A1C: CPT

## 2022-12-14 PROCEDURE — 85025 COMPLETE CBC W/AUTO DIFF WBC: CPT

## 2022-12-14 PROCEDURE — 82248 BILIRUBIN DIRECT: CPT

## 2022-12-14 PROCEDURE — 80061 LIPID PANEL: CPT

## 2022-12-14 PROCEDURE — 80053 COMPREHEN METABOLIC PANEL: CPT

## 2022-12-14 PROCEDURE — 87340 HEPATITIS B SURFACE AG IA: CPT

## 2022-12-14 PROCEDURE — 86803 HEPATITIS C AB TEST: CPT

## 2022-12-14 PROCEDURE — 86704 HEP B CORE ANTIBODY TOTAL: CPT

## 2022-12-15 NOTE — RESULT ENCOUNTER NOTE
Jim,  Your electrolytes, kidney tests, and PSA remain nice and healthy, but your blood sugar and cholesterol values are higher again than previously.  Your cholesterol values are high enough to warrant use of a statin (given your elevated 10-year risk of cardiovascular disease as noted below).  If you are open to trying that again, please let me know, but otherwise continued efforts at healthy eating and regular exercise would be prudent to try to lower your blood sugar and cholesterol values.    The 10-year ASCVD risk score (Akua ROSENTHAL, et al., 2019) is: 14.7%    Values used to calculate the score:      Age: 62 years      Sex: Male      Is Non- : No      Diabetic: No      Tobacco smoker: No      Systolic Blood Pressure: 133 mmHg      Is BP treated: Yes      HDL Cholesterol: 49 mg/dL      Total Cholesterol: 240 mg/dL    Yakov Jones MD

## 2023-01-25 ENCOUNTER — THERAPY VISIT (OUTPATIENT)
Dept: PHYSICAL THERAPY | Facility: CLINIC | Age: 63
End: 2023-01-25
Attending: FAMILY MEDICINE
Payer: COMMERCIAL

## 2023-01-25 DIAGNOSIS — M25.521 RIGHT ELBOW PAIN: ICD-10-CM

## 2023-01-25 DIAGNOSIS — M25.512 SHOULDER PAIN, LEFT: ICD-10-CM

## 2023-01-25 DIAGNOSIS — M25.512 LEFT SHOULDER PAIN, UNSPECIFIED CHRONICITY: ICD-10-CM

## 2023-01-25 PROCEDURE — 97140 MANUAL THERAPY 1/> REGIONS: CPT | Mod: GP | Performed by: PHYSICAL THERAPIST

## 2023-01-25 PROCEDURE — 97110 THERAPEUTIC EXERCISES: CPT | Mod: GP | Performed by: PHYSICAL THERAPIST

## 2023-01-25 PROCEDURE — 97161 PT EVAL LOW COMPLEX 20 MIN: CPT | Mod: GP | Performed by: PHYSICAL THERAPIST

## 2023-01-25 NOTE — PROGRESS NOTES
Physical Therapy Initial Evaluation  Subjective:  The history is provided by the patient. No  was used.   Patient Health History  Bora Monroy being seen for Left shoulder and right elbow.     Problem began: 6/20/2022.   Problem occurred: Doing property cleanup.   Pain is reported as 3/10 on pain scale.  General health as reported by patient is good.  Pertinent medical history includes: high blood pressure and rheumatoid arthritis.   Red flags:  None as reported by patient.  Medical allergies: none.   Surgeries include:  None.    Current medications:  High blood pressure medication and other. Other medications details: Rheumatoid Arthritis Medication.    Current occupation is .   Primary job tasks include:  Computer work, driving, lifting/carrying, operating a machine/assembly and other.   Other job/home tasks details: Responsible for all Mechanical operations..                Therapist Generated HPI Evaluation         Type of problem:  Left shoulder (and R elbow).    This is a chronic condition.  Condition occurred with:  Repetition/overuse.  Where condition occurred: at home.  Patient reports pain:  Anterior.  Pain is described as aching Pain frequency: constant or intermittent, has had multiple episodes since June.    Since onset symptoms are gradually worsening.  Exacerbated by: certain positions, lifting.  Relieved by: rest, not performing lifting.      Restrictions due to condition include:  Working in normal job without restrictions (pt reports helping with shoveling due to being short staffed which also flared things up.).  Barriers include:  None as reported by patient.                        Objective:  System                   Shoulder Evaluation:  ROM:  AROM:    Flexion:  Left:  150    Right:  160  Extension: Left: 35Right: 55  Abduction:  Left: 150   Right:  150      External Rotation:  Left:  70    Right:  80                    Endfeel: firm end feel L  shoulder ER, extension  Strength:            Internal Rotation:  Left:5/5   Strong/pain free    Pain:    Right: 5/5   Strong/pain free    Pain:  External Rotation:   Left:4-/5   Strong/painful    Pain:   Right:4+/5   Strong/pain free    Pain:              Special Tests:  Special tests assessed shoulder: + bicep L speed's, - bicep R speed's.        Mobility Tests:  Mobility wnl shoulder: L first rib decreased mobility, pain upon palpation and trigger points L upper trap, levator and scalene.      Glenohumeral inferior left:  Hypomobile  Glenohumeral inferior right:  Normal               ROM:        Endfeel: elbow extension lacking 5* with firm end feel R elbow: supination and extension R firm end feel with pain, normal end feel L elbow supination and extension                                        General     ROS    Assessment/Plan:    Patient is a 62 year old male with cervical and left side shoulder complaints.    Patient has the following significant findings with corresponding treatment plan.                Diagnosis 1:  L shoulder Pain  Pain -  manual therapy, self management, education and home program  Decreased ROM/flexibility - manual therapy and therapeutic exercise  Decreased joint mobility - manual therapy and therapeutic exercise  Decreased strength - therapeutic exercise and therapeutic activities  Decreased function - therapeutic activities  Impaired posture - neuro re-education    Therapy Evaluation Codes:   1) History comprised of:   Personal factors that impact the plan of care:      Time since onset of symptoms.    Comorbidity factors that impact the plan of care are:      None.     Medications impacting care: None.  2) Examination of Body Systems comprised of:   Body structures and functions that impact the plan of care:      L shoulder, neck.   Activity limitations that impact the plan of care are:      lifting, reaching, carrying.  3) Clinical presentation characteristics  are:   Stable/Uncomplicated.  4) Decision-Making    Low complexity using standardized patient assessment instrument and/or measureable assessment of functional outcome.  Cumulative Therapy Evaluation is: Low complexity.    Previous and current functional limitations:  (See Goal Flow Sheet for this information)    Short term and Long term goals: (See Goal Flow Sheet for this information)     Communication ability:  Patient appears to be able to clearly communicate and understand verbal and written communication and follow directions correctly.  Treatment Explanation - The following has been discussed with the patient:   RX ordered/plan of care  Anticipated outcomes  Possible risks and side effects  This patient would benefit from PT intervention to resume normal activities.   Rehab potential is excellent.    Frequency:  1 X week, once daily  Duration:  for 8 weeks  Discharge Plan:  Achieve all LTG.  Independent in home treatment program.  Reach maximal therapeutic benefit.    Please refer to the daily flowsheet for treatment today, total treatment time and time spent performing 1:1 timed codes.

## 2023-02-01 ENCOUNTER — THERAPY VISIT (OUTPATIENT)
Dept: PHYSICAL THERAPY | Facility: CLINIC | Age: 63
End: 2023-02-01
Payer: COMMERCIAL

## 2023-02-01 DIAGNOSIS — M25.512 SHOULDER PAIN, LEFT: Primary | ICD-10-CM

## 2023-02-01 PROCEDURE — 97110 THERAPEUTIC EXERCISES: CPT | Mod: GP | Performed by: PHYSICAL THERAPIST

## 2023-02-08 ENCOUNTER — THERAPY VISIT (OUTPATIENT)
Dept: PHYSICAL THERAPY | Facility: CLINIC | Age: 63
End: 2023-02-08
Payer: COMMERCIAL

## 2023-02-08 DIAGNOSIS — M25.512 SHOULDER PAIN, LEFT: Primary | ICD-10-CM

## 2023-02-08 PROCEDURE — 97110 THERAPEUTIC EXERCISES: CPT | Mod: GP | Performed by: PHYSICAL THERAPIST

## 2023-03-27 ENCOUNTER — LAB (OUTPATIENT)
Dept: LAB | Facility: CLINIC | Age: 63
End: 2023-03-27
Payer: COMMERCIAL

## 2023-03-27 DIAGNOSIS — Z79.899 HIGH RISK MEDICATION USE: ICD-10-CM

## 2023-03-27 DIAGNOSIS — M05.79 SEROPOSITIVE RHEUMATOID ARTHRITIS OF MULTIPLE SITES (H): ICD-10-CM

## 2023-03-27 LAB
BASOPHILS # BLD AUTO: 0 10E3/UL (ref 0–0.2)
BASOPHILS NFR BLD AUTO: 1 %
EOSINOPHIL # BLD AUTO: 0.1 10E3/UL (ref 0–0.7)
EOSINOPHIL NFR BLD AUTO: 1 %
ERYTHROCYTE [DISTWIDTH] IN BLOOD BY AUTOMATED COUNT: 11.9 % (ref 10–15)
HCT VFR BLD AUTO: 39.9 % (ref 40–53)
HGB BLD-MCNC: 13.7 G/DL (ref 13.3–17.7)
LYMPHOCYTES # BLD AUTO: 1.9 10E3/UL (ref 0.8–5.3)
LYMPHOCYTES NFR BLD AUTO: 30 %
MCH RBC QN AUTO: 32.8 PG (ref 26.5–33)
MCHC RBC AUTO-ENTMCNC: 34.3 G/DL (ref 31.5–36.5)
MCV RBC AUTO: 96 FL (ref 78–100)
MONOCYTES # BLD AUTO: 0.5 10E3/UL (ref 0–1.3)
MONOCYTES NFR BLD AUTO: 9 %
NEUTROPHILS # BLD AUTO: 3.7 10E3/UL (ref 1.6–8.3)
NEUTROPHILS NFR BLD AUTO: 60 %
PLATELET # BLD AUTO: 232 10E3/UL (ref 150–450)
RBC # BLD AUTO: 4.18 10E6/UL (ref 4.4–5.9)
WBC # BLD AUTO: 6.2 10E3/UL (ref 4–11)

## 2023-03-27 PROCEDURE — 80076 HEPATIC FUNCTION PANEL: CPT

## 2023-03-27 PROCEDURE — 36415 COLL VENOUS BLD VENIPUNCTURE: CPT

## 2023-03-27 PROCEDURE — 82565 ASSAY OF CREATININE: CPT

## 2023-03-27 PROCEDURE — 85025 COMPLETE CBC W/AUTO DIFF WBC: CPT

## 2023-03-28 LAB
ALBUMIN SERPL-MCNC: 3.9 G/DL (ref 3.4–5)
ALP SERPL-CCNC: 69 U/L (ref 40–150)
ALT SERPL W P-5'-P-CCNC: 33 U/L (ref 0–70)
AST SERPL W P-5'-P-CCNC: 20 U/L (ref 0–45)
BILIRUB DIRECT SERPL-MCNC: <0.1 MG/DL (ref 0–0.2)
BILIRUB SERPL-MCNC: 0.4 MG/DL (ref 0.2–1.3)
CREAT SERPL-MCNC: 0.94 MG/DL (ref 0.66–1.25)
GFR SERPL CREATININE-BSD FRML MDRD: >90 ML/MIN/1.73M2
PROT SERPL-MCNC: 6.5 G/DL (ref 6.8–8.8)

## 2023-04-19 ENCOUNTER — NURSE TRIAGE (OUTPATIENT)
Dept: FAMILY MEDICINE | Facility: CLINIC | Age: 63
End: 2023-04-19
Payer: COMMERCIAL

## 2023-04-19 NOTE — TELEPHONE ENCOUNTER
Spoke with patient. Relayed provider's message as written. Patient verbalized understanding and has no further questions at this time.    STELLA Jorgensen RN  [facility]

## 2023-04-19 NOTE — TELEPHONE ENCOUNTER
This may be a side effect from the vaccine, but it does not sound typical for expected, so there could be something else going on as well.  If it is a side effect from the vaccine, it should get better on its own over the next week or so.  He could try some massage to the area, some stretching/range of motion exercises, and application of either heat or ice to the area to see if that would help.  If the symptoms persist over the next week, then an office visit for further evaluation may be in order for this.    Yakov Jones MD

## 2023-04-19 NOTE — TELEPHONE ENCOUNTER
"Nurse Triage SBAR    Is this a 2nd Level Triage? NO    Situation:     Tingling sensation--like walking through cobwebs-- right side upper shoulder into neck and into right side of face and to ear after receiving his 2nd Shingles vaccine  2. ONSET: \"When was the vaccine (shot) given?\" \"How much later did the 2nd shingles vaccine given April 14.   begin?\" (e.g., hours, days ago)       States 5hrs after injection noted his upper arm and shoulder area were red.  States didn't feel well.  Aching in middle of night; sinuses felt plugged.  Saturday didn't feel well; laid around all day.  Saturday pm noted tingling in right ear and sensation of cobwebs side of neck.  States the sensation involves his upper right shoulder into right side of neck and face.  He states the sensations come and go; can occur every 5 minutes, other times every couple hours.  States are very brief but recurrent.  Doesn't notice when sleeping.     +++ He is wondering if PCP may know what is causing this symptom and if anything can be done to stop it.  I did give him the phone number to report the possible side effect to [facility].  He states will contact them.  Wanting to hear back from his provider.      Recommendation: Please advise.      Routed to provider    Does the patient meet one of the following criteria for ADS visit consideration? 16+ years old, with an [facility] PCP     TIP  Providers, please consider if this condition is appropriate for management at one of our Acute and Diagnostic Services sites.     If patient is a good candidate, please use dotphrase <dot>triageresponse and select Refer to ADS to document.    Additional Information    Negative: Difficulty breathing or swallowing starts within 2 hours after injection    Negative: Difficult to awaken or acting confused (e.g., disoriented, slurred speech)    Negative: Unresponsive, passed out, or very weak    Negative: Sounds like a life-threatening emergency to the triager    Negative: " "COVID-19 vaccine reaction suspected (e.g., fever, headache, muscle aches occurring during days 13 after getting vaccine)    Negative: COVID-19 vaccine, questions about    Negative: Fever > 104 F (40 C)    Negative: Measles vaccine rash (onset day 6-12) and purple or blood-colored    Negative: Sounds like a severe, unusual reaction to the triager    Negative: Fever > 101 F (38.3 C) begins > 48 hours after getting vaccine and over 60 years of age    Negative: Fever > 100.0 F (37.8 C) and has diabetes mellitus or a weak immune system (e.g., HIV positive, cancer chemotherapy, organ transplant, splenectomy, chronic steroids)    Negative: Fever > 100.0 F (37.8 C) and bedridden (e.g., nursing home patient, stroke, chronic illness, recovering from surgery)    Negative: Redness or red streak around the injection site begins > 48 hours after shot    Negative: Fever present > 3 days (72 hours)    Negative: Smallpox vaccine and eye pain, eye redness, or rash on eyelids    Negative: Deep lump follows (in 2 to 8 weeks) Td or TDaP shot, and becomes tender to the touch    Negative: Patient wants to be seen    Answer Assessment - Initial Assessment Questions  1. SYMPTOMS: \"What is the main symptom?\" (e.g., redness, swelling, pain)       Tingling sensation--like walking through cobwebs-- right side upper shoulder into neck and into right side of face and to ear  2. ONSET: \"When was the vaccine (shot) given?\" \"How much later did the 2nd shingles vaccine given April 14.   begin?\" (e.g., hours, days ago)       States 5hrs after injection noted his upper arm and shoulder area were red.  States didn't feel well.  Aching in middle of night; sinuses felt plugged.  Saturday didn't feel well; laid around all day.  Saturday pm noted tingling in right ear and sensation of cobwebs side of neck.  States the sensation involves his upper right shoulder into right side of neck and face.  He states the sensations come and go; can occur every 5 " "minutes, other times every couple hours.  States are very brief but recurrent.    3. SEVERITY: \"How bad is it?\"       States not painful but do interfere with his day.  They aren't lessening.  4. FEVER: \"Is there a fever?\" If Yes, ask: \"What is it, how was it measured, and when did it start?\"       No fever  5. IMMUNIZATIONS GIVEN: \"What shots have you recently received?\"      2nd shingles vaccine  6. PAST REACTIONS: \"Have you reacted to immunizations before?\" If Yes, ask: \"What happened?\"      No.    7. OTHER SYMPTOMS: \"Do you have any other symptoms?\"      Denies any other symptoms.  He  States has no facial drooping.  No hearing concerns/changes.  No arm or leg weakness.  No rash.  No redness.  No swelling.  No itching.  No longer had the aching or sinus symptoms.  States no burning sensation.  Describes as tingling, sensation of cobwebs, like a static charge.    Has had Bell's Palsy in past (many years ago) secondary to a virus.  States doesn't have those symptoms.    +++ He is wondering if PCP may know what is causing this symptom and if anything can be done to stop it.  I did give him the phone number to report the possible side effect to [facility].  He states will contact them.  Wanting to hear back from his provider.    Tere HILL RN    Protocols used: IMMUNIZATION [id number]      "

## 2023-04-28 NOTE — TELEPHONE ENCOUNTER
"Chief Complaint  Hospital Follow Up Visit    Subjective        Yury Sanchez presents to [facility]  History of Present Illness  For ER follow up.  Patient had 1 ER visit in February for near syncope at that time he had an episode where he had shortness of breath associated with palpitation and feeling of passing out.  Patient was referred to cardiology and has recently been seen by the cardiology and as per cardiology problem does not seem heart related.  Patient stated he had 3 or more episodes since ER visit and last one was on last wednesday when he had feeling like he could not breathe and then woke up from sleep having palpitations.  Patient has concern of nasal congestion all the time due to allergies and think maybe he has some issues with the breathing and that is the reason he is waking up from his sleep with shortness of breath and palpitation.  Patient has never seen ENT specialist before.  Has history of allergies for so many years and currently is on Flonase, Singulair, Mucinex as needed.      Objective   Vital Signs:  /78   Pulse 76   Temp 97.4 °F (36.3 °C)   Resp 16   Ht 177.8 cm (70\")   Wt 78.9 kg (174 lb)   SpO2 99%   BMI 24.97 kg/m²   Estimated body mass index is 24.97 kg/m² as calculated from the following:    Height as of this encounter: 177.8 cm (70\").    Weight as of this encounter: 78.9 kg (174 lb).       BMI is within normal parameters. No other follow-up for BMI required.      Physical Exam  HENT:      Head: Normocephalic and atraumatic.      Right Ear: There is impacted cerumen.      Mouth/Throat:      Mouth: Mucous membranes are moist.      Pharynx: Oropharynx is clear.   Eyes:      Extraocular Movements: Extraocular movements intact.      Conjunctiva/sclera: Conjunctivae normal.      Pupils: Pupils are equal, round, and reactive to light.   Cardiovascular:      Rate and Rhythm: Normal rate and regular rhythm.   Pulmonary:      " 1st attempt to contact Pt, l/m having Pt return call to clinic @ [phone] re: Labs are normal. Leflunomide has been refilled. Rheumatology clinic follow-up required for future refills. Telephone encounter started 11/25/19.    Bernadette Sykes CMA  11/25/2019  1:49 PM   Effort: Pulmonary effort is normal.      Breath sounds: Normal breath sounds.   Abdominal:      General: Bowel sounds are normal.      Palpations: Abdomen is soft.   Musculoskeletal:         General: Normal range of motion.      Cervical back: Neck supple.   Skin:     General: Skin is warm.      Capillary Refill: Capillary refill takes less than 2 seconds.   Neurological:      General: No focal deficit present.      Mental Status: He is alert and oriented to person, place, and time. Mental status is at baseline.   Psychiatric:         Mood and Affect: Mood normal.        Result Review :                   Assessment and Plan   Diagnoses and all orders for this visit:    1. Mild intermittent asthma without complication (Primary)  -     Ambulatory Referral to ENT (Otolaryngology)  -     Ambulatory Referral to Pulmonology  -     fluticasone (Flonase) 50 MCG/ACT nasal spray; 2 sprays into the nostril(s) as directed by provider Daily.  Dispense: 15.8 mL; Refill: 11  -     budesonide-formoterol (Symbicort) 160-4.5 MCG/ACT inhaler; Inhale 2 puffs 2 (Two) Times a Day.  Dispense: 10.2 g; Refill: 3    2. Seasonal allergies  -     Ambulatory Referral to ENT (Otolaryngology)    The patient is advised to use Symbicort at night since episodes are happening mostly at night or in other words more severe during nighttime.  Advised patient to use Flonase daily  ENT referral for further checkup provided along with pulmonary referral.         Follow Up   No follow-ups on file.  Patient was given instructions and counseling regarding his condition or for health maintenance advice. Please see specific information pulled into the AVS if appropriate.

## 2023-06-05 ENCOUNTER — LAB (OUTPATIENT)
Dept: LAB | Facility: CLINIC | Age: 63
End: 2023-06-05
Payer: COMMERCIAL

## 2023-06-05 DIAGNOSIS — Z79.899 HIGH RISK MEDICATION USE: ICD-10-CM

## 2023-06-05 DIAGNOSIS — M05.79 SEROPOSITIVE RHEUMATOID ARTHRITIS OF MULTIPLE SITES (H): ICD-10-CM

## 2023-06-05 LAB
BASOPHILS # BLD AUTO: 0 10E3/UL (ref 0–0.2)
BASOPHILS NFR BLD AUTO: 0 %
EOSINOPHIL # BLD AUTO: 0.1 10E3/UL (ref 0–0.7)
EOSINOPHIL NFR BLD AUTO: 1 %
ERYTHROCYTE [DISTWIDTH] IN BLOOD BY AUTOMATED COUNT: 11.7 % (ref 10–15)
ERYTHROCYTE [SEDIMENTATION RATE] IN BLOOD BY WESTERGREN METHOD: 4 MM/HR (ref 0–20)
HCT VFR BLD AUTO: 41.4 % (ref 40–53)
HGB BLD-MCNC: 14.2 G/DL (ref 13.3–17.7)
IMM GRANULOCYTES # BLD: 0 10E3/UL
IMM GRANULOCYTES NFR BLD: 0 %
LYMPHOCYTES # BLD AUTO: 1.6 10E3/UL (ref 0.8–5.3)
LYMPHOCYTES NFR BLD AUTO: 34 %
MCH RBC QN AUTO: 33.1 PG (ref 26.5–33)
MCHC RBC AUTO-ENTMCNC: 34.3 G/DL (ref 31.5–36.5)
MCV RBC AUTO: 97 FL (ref 78–100)
MONOCYTES # BLD AUTO: 0.4 10E3/UL (ref 0–1.3)
MONOCYTES NFR BLD AUTO: 8 %
NEUTROPHILS # BLD AUTO: 2.7 10E3/UL (ref 1.6–8.3)
NEUTROPHILS NFR BLD AUTO: 56 %
PLATELET # BLD AUTO: 205 10E3/UL (ref 150–450)
RBC # BLD AUTO: 4.29 10E6/UL (ref 4.4–5.9)
WBC # BLD AUTO: 4.7 10E3/UL (ref 4–11)

## 2023-06-05 PROCEDURE — 85652 RBC SED RATE AUTOMATED: CPT

## 2023-06-05 PROCEDURE — 85025 COMPLETE CBC W/AUTO DIFF WBC: CPT

## 2023-06-05 PROCEDURE — 86140 C-REACTIVE PROTEIN: CPT

## 2023-06-05 PROCEDURE — 80076 HEPATIC FUNCTION PANEL: CPT

## 2023-06-05 PROCEDURE — 36415 COLL VENOUS BLD VENIPUNCTURE: CPT

## 2023-06-06 LAB
ALBUMIN SERPL BCG-MCNC: 4.5 G/DL (ref 3.5–5.2)
ALP SERPL-CCNC: 72 U/L (ref 40–129)
ALT SERPL W P-5'-P-CCNC: 22 U/L (ref 10–50)
AST SERPL W P-5'-P-CCNC: 21 U/L (ref 10–50)
BILIRUB DIRECT SERPL-MCNC: <0.2 MG/DL (ref 0–0.3)
BILIRUB SERPL-MCNC: 0.4 MG/DL
CRP SERPL-MCNC: <3 MG/L
PROT SERPL-MCNC: 6.5 G/DL (ref 6.4–8.3)

## 2023-06-07 ENCOUNTER — OFFICE VISIT (OUTPATIENT)
Dept: RHEUMATOLOGY | Facility: CLINIC | Age: 63
End: 2023-06-07
Payer: COMMERCIAL

## 2023-06-07 VITALS
DIASTOLIC BLOOD PRESSURE: 78 MMHG | HEART RATE: 60 BPM | WEIGHT: 173.2 LBS | OXYGEN SATURATION: 99 % | BODY MASS INDEX: 24.16 KG/M2 | SYSTOLIC BLOOD PRESSURE: 161 MMHG

## 2023-06-07 DIAGNOSIS — M05.79 SEROPOSITIVE RHEUMATOID ARTHRITIS OF MULTIPLE SITES (H): Primary | ICD-10-CM

## 2023-06-07 DIAGNOSIS — T50.Z95A ADVERSE EFFECT OF VACCINE, INITIAL ENCOUNTER: ICD-10-CM

## 2023-06-07 DIAGNOSIS — Z79.899 HIGH RISK MEDICATION USE: ICD-10-CM

## 2023-06-07 PROCEDURE — 99214 OFFICE O/P EST MOD 30 MIN: CPT | Performed by: INTERNAL MEDICINE

## 2023-06-07 RX ORDER — LEFLUNOMIDE 20 MG/1
20 TABLET ORAL EVERY OTHER DAY
Qty: 45 TABLET | Refills: 2 | Status: SHIPPED | OUTPATIENT
Start: 2023-06-07 | End: 2023-12-06

## 2023-06-07 RX ORDER — METHYLPREDNISOLONE 4 MG
TABLET, DOSE PACK ORAL
Qty: 21 TABLET | Refills: 0 | Status: SHIPPED | OUTPATIENT
Start: 2023-06-07 | End: 2023-08-22

## 2023-06-07 NOTE — PROGRESS NOTES
Rheumatology Clinic Visit      Bora Monroy MRN# [id number]   YOB: 1960 Age: 63 year old      Date of visit: 6/07/23   PCP: Dr. Yakov Jones     Chief Complaint     Patient presents with:  Rheumatoid Arthritis: Patient is doing good    Assessment and Plan     1. Seropositive rheumatoid arthritis (RF negative, CCP positive): Currently on leflunomide 20 mg every other day.  Doing well at this time with regard to rheumatoid arthritis.  Chronic illness, stable.    - Continue leflunomide 20 mg every other day  - Labs in 3 months: CBC, Creatinine, Hepatic Panel  - Labs in 6 months: CBC, Creatinine, Hepatic Panel, ESR, CRP     High risk medication requiring intensive toxicity monitoring at least quarterly.    2.  Intermittent paresthesias extending from the lateral aspect of the right shoulder proximally towards the right jaw and onto the face; reaction to shingrix vaccine?: Started after second dose of the Shingrix vaccine that was received on 4/14/2023, multiple episodes per day with each lasting for 1-60 seconds.  Has not been improving over time.  States that he has reached out to his primary care provider who advised him to give it time and that it was not typical of a reaction to the Shingrix vaccine and to follow-up if symptoms persist.  Symptoms are persisting to date.  Discussed the option of treating as a reaction, although this is atypical for reaction to the Shingrix vaccine.  Use a Medrol Dosepak to treat as a reaction but if this does not resolve the symptoms then he is to follow-up with his primary care provider.    3.  Vaccinations: Vaccinations reviewed with Mr. Monroy.  Risks and benefits of vaccinations were discussed.    - Influenza: encouraged yearly vaccination   - Shingrix: Up-to-date  - COVID-19: Advised updating    4. Elevated blood pressure:  Jim to follow up with Primary Care provider regarding elevated blood pressure.     Total minutes spent in evaluation with patient,  documentation, , and review of pertinent studies and chart notes: 19      Mr. Monroy verbalized agreement with and understanding of the rational for the diagnosis and treatment plan.  All questions were answered to best of my ability and the patient's satisfaction. Mr. Monroy was advised to contact the clinic with any questions that may arise after the clinic visit.      Thank you for involving me in the care of the patient    Return to clinic: June 2023    [facility]   Bora Monroy is a 63 year old male hyperlipidemia, and seropositive rheumatoid arthritis who is here for follow-up of RA.     Today, 6/7/2023: RA controlled.  No joint pain or swelling.  No morning stiffness or gelling phenomenon.  Arthritis does not limit his daily activities.  Since last seen he had biceps tendinitis related to raking at a cabin; this resolved with physical therapy exercises.  He notes that after the Shingrix vaccine on 4/14/2023 has had multiple episodes per day with each lasting for 1-60 seconds of intermittent paresthesias extending from the lateral aspect of the right shoulder where he had the vaccine, traveling approximately towards the right jaw and onto his face but not extending over midline and not extending over his nose or eye.  Periods of being asymptomatic between paresthesia episodes.  States that the paresthesias feel like somebody put Novocain on the affected area.    Denies fevers, chills, nausea, vomiting, constipation, diarrhea. No abdominal pain. No chest pain/pressure, palpitations, or shortness of breath. No oral or nasal sores. No neck pain. No rash. No LE swelling.      ROS   12 point review of system was completed and negative except as noted in the HPI     Active Problem List     Patient Active Problem List   Diagnosis     Presbyopias     Hyperopia     Hyperlipidemia with target LDL less than 130     Impaired fasting glucose     High risk medications (not anticoagulants) long-term use     Advanced  directives, counseling/discussion     Seropositive rheumatoid arthritis of multiple sites (HCC)     Hypertension goal BP (blood pressure) < 140/90     Kidney stone     Shoulder pain, left     Past Medical History     Past Medical History:   Diagnosis Date     Hyperlipidemia LDL goal < 130     declines statins -- favorable CAC in 2/18     Hypertension goal BP (blood pressure) < 140/90 11/17/2015    NL stress test 4/1/16, med started 11/16     Impaired fasting glucose      Kidney stone 12/8/2016     Seropositive rheumatoid arthritis of multiple sites (H) 11/17/2015     Past Surgical History     Past Surgical History:   Procedure Laterality Date     COLONOSCOPY  11/5/2010    COLONOSCOPY performed by FERMIN MCCLELLAND at [facility]     COLONOSCOPY N/A 1/25/2021    Procedure: COLONOSCOPY;  Surgeon: Anderson Heredia MD;  Location: [facility]     TONSILLECTOMY  1964    AGE 4     Allergy     Allergies   Allergen Reactions     Azithromycin Diarrhea     Current Medication List     Current Outpatient Medications   Medication Sig     amLODIPine (NORVASC) 5 MG tablet Take 1 tablet (5 mg) by mouth daily     Cholecalciferol (VITAMIN D PO) Take 1,000 Units by mouth     cinnamon 500 MG TABS Take 1 tablet by mouth daily      FISH OIL 1000 MG OR CAPS 1 tablet daily     glucosamine-chondroitin 500-400 MG CAPS per capsule Take 1 capsule by mouth daily     KRILL OIL PO Take 1 tablet by mouth daily     leflunomide (ARAVA) 20 MG tablet Take 1 tablet (20 mg) by mouth every other day .  Labs required every 3 months.     losartan (COZAAR) 50 MG tablet Take 1 tablet (50 mg) by mouth daily     Misc Natural Products (TURMERIC CURCUMIN) CAPS Take by mouth daily     MULTI-VITAMIN OR TABS 1 TABLET DAILY     psyllium (METAMUCIL) 30.9 % POWD Take 1 tsp by mouth 2 times daily.     triamcinolone (KENALOG) 0.1 % external cream Apply sparingly to affected area two to three times daily as needed for hand rash     VITAMIN C OR None Entered     VITAMIN E PO      No  "current facility-administered medications for this visit.     Social History     See HPI    Family History     Family History   Problem Relation Age of Onset     Lipids Sister      Diabetes Sister      Lipids Father      Hypertension Father      Cerebrovascular Disease Maternal Aunt      Cancer No family hx of        Physical Exam     Temp Readings from Last 3 Encounters:   12/09/22 97.9  F (36.6  C) (Oral)   12/08/21 97.8  F (36.6  C) (Oral)   09/24/21 98.4  F (36.9  C) (Tympanic)     BP Readings from Last 5 Encounters:   06/07/23 (!) 161/78   12/09/22 133/66   12/08/21 136/69   10/07/21 126/75   09/24/21 117/71     Pulse Readings from Last 1 Encounters:   06/07/23 60     Resp Readings from Last 1 Encounters:   09/24/21 14     Estimated body mass index is 24.16 kg/m  as calculated from the following:    Height as of 12/9/22: 1.803 m (5' 11\").    Weight as of this encounter: 78.6 kg (173 lb 3.2 oz).        GEN: NAD. Healthy appearing adult.   HEENT:  Anicteric, noninjected sclera. No obvious external lesions of the ear and nose. Hearing intact.  CV: S1, S2. RRR. No m/r/g  PULM: No increased work of breathing. CTA bilaterally   MSK: MCPs, PIPs, DIPs without swelling or tenderness to palpation.  Wrists without swelling or tenderness to palpation.  Elbows and shoulders without swelling or tenderness to palpation. Knees, ankles, and MTPs without swelling or tenderness to palpation.    SKIN: No rash or jaundice seen  PSYCH: Alert. Appropriate.        Labs       CBC  Recent Labs   Lab Test 06/05/23  1414 03/27/23  1523 12/14/22  0715 08/10/21  1457 03/22/21  1454 01/21/21  1537 11/30/20  0844 08/19/20  1513   WBC 4.7 6.2 3.5*   < > 5.3   < > 4.3 5.3   RBC 4.29* 4.18* 4.19*   < > 3.91*   < > 4.57 4.37*   HGB 14.2 13.7 13.6   < > 13.1*   < > 15.0 14.4   HCT 41.4 39.9* 40.0   < > 37.9*   < > 42.7 40.8   MCV 97 96 96   < > 97   < > 93 93   RDW 11.7 11.9 11.5   < > 11.6   < > 11.7 11.6    232 212   < > 227   < > 192 205 "   MCH 33.1* 32.8 32.5   < > 33.5*   < > 32.8 33.0   MCHC 34.3 34.3 34.0   < > 34.6   < > 35.1 35.3   NEUTROPHIL 56 60 48   < > 59.3  --  55.2 57.6   LYMPH 34 30 37   < > 29.9  --  34.3 32.3   MONOCYTE 8 9 12   < > 9.3  --  8.8 8.8   EOSINOPHIL 1 1 3   < > 1.1  --  1.2 0.9   BASOPHIL 0 1 0   < > 0.4  --  0.5 0.4   ANEU  --   --   --   --  3.1  --  2.4 3.1   ALYM  --   --   --   --  1.6  --  1.5 1.7   MANDI  --   --   --   --  0.5  --  0.4 0.5   AEOS  --   --   --   --  0.1  --  0.1 0.1   ABAS  --   --   --   --  0.0  --  0.0 0.0   ANEUTAUTO 2.7 3.7 1.7   < >  --   --   --   --    ALYMPAUTO 1.6 1.9 1.3   < >  --   --   --   --    AMONOAUTO 0.4 0.5 0.4   < >  --   --   --   --    AEOSAUTO 0.1 0.1 0.1   < >  --   --   --   --    ABSBASO 0.0 0.0 0.0   < >  --   --   --   --     < > = values in this interval not displayed.     [facility]  Recent Labs   Lab Test 06/05/23  1414 03/27/23  1523 12/14/22  0715 09/08/22  1502 02/08/22  1513 11/26/21  0753 08/10/21  1457 03/22/21  1454 01/21/21  1537 11/30/20  0844   NA  --   --  137  --   --  132*  --   --  136 138   POTASSIUM  --   --  4.5  --   --  4.2  --   --  4.4 3.9   CHLORIDE  --   --  103  --   --  100  --   --  102 104   CO2  --   --  30  --   --  30  --   --  30 29   ANIONGAP  --   --  4  --   --  2*  --   --  4 5   GLC  --   --  112*  --   --  109*  --   --  98 108*   BUN  --   --  14  --   --  14  --   --  12 12   CR  --  0.94 0.88 1.14   < > 0.86   < > 1.02 0.86 0.80  0.83   GFRESTIMATED  --  >90 >90 73   < > >90   < > 79 >90 >90  >90   GFRESTBLACK  --   --   --   --   --   --   --  >90 >90 >90  >90   MOLINA  --   --  8.9  --   --  8.7  --   --  9.1 8.8   BILITOTAL 0.4 0.4 0.5 0.6   < > 0.5   < > 0.6 0.7 0.5   ALBUMIN 4.5 3.9 3.8 4.0   < > 3.9   < > 4.3 4.4 4.2   PROTTOTAL 6.5 6.5* 6.7* 6.6*   < > 6.8   < > 6.8 7.2 7.0   ALKPHOS 72 69 66 73   < > 76   < > 77 85 74   AST 21 20 17 20   < > 19   < > 12 23 17   ALT 22 33 30 30   < > 31   < > 25 32 32    < > = values in this  interval not displayed.     Calcium/VitaminD  Recent Labs   Lab Test 12/14/22  0715 11/26/21  0753 01/21/21  1537 11/17/15  0746 10/12/15  1439   MOLINA 8.9 8.7 9.1   < >  --    VITDT  --   --   --   --  46    < > = values in this interval not displayed.     ESR/CRP  Recent Labs   Lab Test 06/05/23  1414 09/08/22  1502 02/08/22  1513 11/26/21  0753   SED 4 4 5 6   CRP  --  <2.9 <2.9 <2.9   CRPI <3.00  --   --   --      Hepatitis B  Recent Labs   Lab Test 12/14/22  0715   HBCAB Nonreactive   HEPBANG Nonreactive     Hepatitis C  Recent Labs   Lab Test 12/14/22  0715   HCVAB Nonreactive     HIV Screening  Recent Labs   Lab Test 10/16/18  0757   HIAGAB Nonreactive       Immunization History     Immunization History   Administered Date(s) Administered     COVID-19 Bivalent 18+ (Moderna) 10/25/2022     COVID-19 MONOVALENT 12+ (Pfizer) 03/16/2021, 04/06/2021, 09/24/2021     COVID-19 Monovalent 18+ (Moderna) 04/01/2022     Influenza (IIV3) PF 10/29/2007, 10/13/2012, 10/15/2013, 12/02/2022     Influenza Vaccine 50-64 or 18-64 w/egg allergy (Flublok) 09/24/2021     Influenza Vaccine >6 months (Alfuria,Fluzone) 10/30/2014, 10/29/2015, 10/15/2016, 10/13/2017, 10/15/2018, 10/15/2019, 11/11/2020     Pneumococcal 23 valent 10/31/2002     TD,PF 7+ (Tenivac) 09/03/2004     TDAP Vaccine (Boostrix) 11/13/2014     Zoster recombinant adjuvanted (SHINGRIX) 12/09/2022          Chart documentation done in part with Dragon Voice recognition Software. Although reviewed after completion, some word and grammatical error may remain.      Eduard Mazariegos MD

## 2023-06-07 NOTE — PATIENT INSTRUCTIONS
RHEUMATOLOGY    [facility]  [address]  [place] [address]    Phone number: [phone]  Fax number: [phone]      Thank you for choosing [facility]!    Shonna Kenny CMA

## 2023-06-07 NOTE — Clinical Note
"Dr. Jones, The \"reaction\" to the shingrix vaccine persists -intermittent paresthesias extending from the lateral aspect of the right shoulder proximally towards the right jaw and onto the face, occurring for 1-60 seconds multiple times a day.  This doesn't sound like a reaction to the shingrix vaccine but it is correlated with it.  I gave a medrol dosepak to see if it resolves symptoms but if it doesn't then I asked that he visit with you.  Thanks! Eduard"

## 2023-08-21 ENCOUNTER — TELEPHONE (OUTPATIENT)
Dept: FAMILY MEDICINE | Facility: CLINIC | Age: 63
End: 2023-08-21

## 2023-08-21 NOTE — TELEPHONE ENCOUNTER
Called patient to try to scheduled for soonest available. Please assist with scheduling when patient returns call.     India Bell -    [facility]

## 2023-08-21 NOTE — TELEPHONE ENCOUNTER
Reason for Call:  Appointment Request    Patient requesting this type of appt:  plugged left ear     Requested provider: Yakov Jones    Reason patient unable to be scheduled: Not with their preferred provider    When does patient want to be seen/preferred time: Same day    Comments: patient would like to get an appt asap     Could we send this information to you in [place] or would you prefer to receive a phone call?:   Patient would prefer a phone call   Okay to leave a detailed message?: Yes at Work number on file:  [phone] (work)    Call taken on 8/21/2023 at 8:22 AM by Leatha Herrera

## 2023-08-22 ENCOUNTER — OFFICE VISIT (OUTPATIENT)
Dept: FAMILY MEDICINE | Facility: CLINIC | Age: 63
End: 2023-08-22
Payer: COMMERCIAL

## 2023-08-22 VITALS
TEMPERATURE: 97.7 F | DIASTOLIC BLOOD PRESSURE: 73 MMHG | WEIGHT: 171 LBS | HEART RATE: 63 BPM | HEIGHT: 71 IN | OXYGEN SATURATION: 97 % | SYSTOLIC BLOOD PRESSURE: 123 MMHG | BODY MASS INDEX: 23.94 KG/M2

## 2023-08-22 DIAGNOSIS — Z79.899 HIGH RISK MEDICATIONS (NOT ANTICOAGULANTS) LONG-TERM USE: ICD-10-CM

## 2023-08-22 DIAGNOSIS — M05.79 SEROPOSITIVE RHEUMATOID ARTHRITIS OF MULTIPLE SITES (H): ICD-10-CM

## 2023-08-22 DIAGNOSIS — R20.2 NUMBNESS AND TINGLING OF RIGHT SIDE OF FACE: Primary | ICD-10-CM

## 2023-08-22 DIAGNOSIS — R20.0 NUMBNESS AND TINGLING OF RIGHT SIDE OF FACE: Primary | ICD-10-CM

## 2023-08-22 DIAGNOSIS — H93.8X2 PLUGGED FEELING IN EAR, LEFT: ICD-10-CM

## 2023-08-22 PROCEDURE — 99214 OFFICE O/P EST MOD 30 MIN: CPT | Performed by: FAMILY MEDICINE

## 2023-08-22 ASSESSMENT — PAIN SCALES - GENERAL: PAINLEVEL: NO PAIN (0)

## 2023-08-22 ASSESSMENT — ENCOUNTER SYMPTOMS: ALLERGIC REACTION: 1

## 2023-08-22 NOTE — PROGRESS NOTES
Assessment & Plan       ICD-10-CM    1. Numbness and tingling of right side of face  R20.0     R20.2       2. Plugged feeling in ear, left  H93.8X2       3. Seropositive rheumatoid arthritis of multiple sites (HCC)  M05.79       4. High risk medications (not anticoagulants) long-term use  Z79.899         His right-sided facial numbness and tingling is certainly temporally related to the shingles vaccine, and I suspect he must of had some type of reaction to that, perhaps in part due to his underlying immunosuppression  Since he is not having any apparent or significant neurologic deficits, I suggested we just follow this for now and I suspect it will get better with time  Discussed possible role for brain MRI or neurology consultation if symptoms persist  His left ear looks fine, and he recalls having something similar to this a few years ago where he was seen by audiology and ENT and it eventually got better, so we will just follow that for now as well    Continue same baseline meds and plan a tentative recheck in December with an annual physical         Yakov Jones MD  [facility] Fernandez is a 63 year old, presenting for the following health issues:  Allergic Reaction (Poss. reaction to the shingles shot, the 2nd shot, he had fatigue and a lot of pain the day of. The next day he gets a sensation of static charge, feels like needles on his head, face and neck.) and Ear Problem (Left ear plugged)        8/22/2023    10:53 AM   Additional Questions   Roomed by cam   Accompanied by none         8/22/2023    10:53 AM   Patient Reported Additional Medications   Patient reports taking the following new medications none       History of Present Illness       Reason for visit:  Problem with left ear.  Symptom onset:  1-2 weeks ago  Symptoms include:  Plugged feeling in ear.  Symptom intensity:  Moderate  Symptom progression:  Staying the same  Had these symptoms before:  No  What  makes it worse:  Not really  What makes it better:  Not really    He eats 2-3 servings of fruits and vegetables daily.He consumes 0 sweetened beverage(s) daily.He exercises with enough effort to increase his heart rate 10 to 19 minutes per day.  He exercises with enough effort to increase his heart rate 4 days per week.   He is taking medications regularly.     His left ear has felt plugged for the last week or 2.  No significant cough or cold symptoms or other URI symptoms.  He received his second shingles vaccine earlier this year in April.  Later that day he started feeling poorly.  The next day he woke up and he had some tingling and numbness on the right side of his head and face.  Those symptoms have persisted to some degree off and on since then.  They seem to be worse after drinking alcohol or caffeine.  They are worse if he is outside on a hot day.  He has not noticed any motor weakness to the facial muscles.  No slurred speech or other apparent neurologic deficits with the head or neck region.  He did discuss this with his rheumatologist a couple of months ago and received a Medrol Dosepak, but that did not seem to do much for the symptoms.  He does have underlying rheumatoid arthritis and is on long-term medication for that and is somewhat immunosuppressed.    Patient Active Problem List   Diagnosis    Presbyopias    Hyperopia    Hyperlipidemia with target LDL less than 130    Impaired fasting glucose    High risk medications (not anticoagulants) long-term use    Advanced directives, counseling/discussion    Seropositive rheumatoid arthritis of multiple sites (HCC)    Hypertension goal BP (blood pressure) < 140/90    Kidney stone    Shoulder pain, left     Current Outpatient Medications   Medication    amLODIPine (NORVASC) 5 MG tablet    Cholecalciferol (VITAMIN D PO)    cinnamon 500 MG TABS    FISH OIL 1000 MG OR CAPS    glucosamine-chondroitin 500-400 MG CAPS per capsule    KRILL OIL PO    leflunomide  "(ARAVA) 20 MG tablet    losartan (COZAAR) 50 MG tablet    Misc Natural Products (TURMERIC CURCUMIN) CAPS    MULTI-VITAMIN OR TABS    psyllium (METAMUCIL) 30.9 % POWD    VITAMIN C OR    VITAMIN E PO    triamcinolone (KENALOG) 0.1 % external cream     No current facility-administered medications for this visit.             Review of Systems   HENT:  Positive for ear pain.             Objective    /73 (BP Location: Left arm, Patient Position: Sitting, Cuff Size: Adult Regular)   Pulse 63   Temp 97.7  F (36.5  C) (Oral)   Ht 1.805 m (5' 11.06\")   Wt 77.6 kg (171 lb)   SpO2 97%   BMI 23.81 kg/m    Body mass index is 23.81 kg/m .  Physical Exam   GENERAL: healthy, alert and no distress  EYES: Eyes grossly normal to inspection, PERRL and conjunctivae and sclerae normal  HENT: ear canals and TM's normal, nose and mouth without ulcers or lesions.  Cranial nerves II through XII are intact.  NECK: no adenopathy, no asymmetry, masses, or scars and thyroid normal to palpation.  No carotid bruits.  NEURO: Normal strength and tone, mentation intact and speech normal    Previous chart notes were reviewed from a few years ago with ENT and audiology for right ear symptoms and then a couple of months ago with rheumatology                  "

## 2023-09-13 ENCOUNTER — LAB (OUTPATIENT)
Dept: LAB | Facility: CLINIC | Age: 63
End: 2023-09-13
Payer: COMMERCIAL

## 2023-09-13 DIAGNOSIS — M05.79 SEROPOSITIVE RHEUMATOID ARTHRITIS OF MULTIPLE SITES (H): ICD-10-CM

## 2023-09-13 DIAGNOSIS — Z79.899 HIGH RISK MEDICATION USE: ICD-10-CM

## 2023-09-13 LAB
BASOPHILS # BLD AUTO: 0 10E3/UL (ref 0–0.2)
BASOPHILS NFR BLD AUTO: 1 %
EOSINOPHIL # BLD AUTO: 0.1 10E3/UL (ref 0–0.7)
EOSINOPHIL NFR BLD AUTO: 1 %
ERYTHROCYTE [DISTWIDTH] IN BLOOD BY AUTOMATED COUNT: 11.8 % (ref 10–15)
HCT VFR BLD AUTO: 39.7 % (ref 40–53)
HGB BLD-MCNC: 13.9 G/DL (ref 13.3–17.7)
IMM GRANULOCYTES # BLD: 0 10E3/UL
IMM GRANULOCYTES NFR BLD: 0 %
LYMPHOCYTES # BLD AUTO: 1.8 10E3/UL (ref 0.8–5.3)
LYMPHOCYTES NFR BLD AUTO: 34 %
MCH RBC QN AUTO: 33.3 PG (ref 26.5–33)
MCHC RBC AUTO-ENTMCNC: 35 G/DL (ref 31.5–36.5)
MCV RBC AUTO: 95 FL (ref 78–100)
MONOCYTES # BLD AUTO: 0.5 10E3/UL (ref 0–1.3)
MONOCYTES NFR BLD AUTO: 9 %
NEUTROPHILS # BLD AUTO: 3 10E3/UL (ref 1.6–8.3)
NEUTROPHILS NFR BLD AUTO: 55 %
PLATELET # BLD AUTO: 193 10E3/UL (ref 150–450)
RBC # BLD AUTO: 4.18 10E6/UL (ref 4.4–5.9)
WBC # BLD AUTO: 5.4 10E3/UL (ref 4–11)

## 2023-09-13 PROCEDURE — 82565 ASSAY OF CREATININE: CPT

## 2023-09-13 PROCEDURE — 80076 HEPATIC FUNCTION PANEL: CPT

## 2023-09-13 PROCEDURE — 85025 COMPLETE CBC W/AUTO DIFF WBC: CPT

## 2023-09-13 PROCEDURE — 36415 COLL VENOUS BLD VENIPUNCTURE: CPT

## 2023-09-14 LAB
ALBUMIN SERPL BCG-MCNC: 4.7 G/DL (ref 3.5–5.2)
ALP SERPL-CCNC: 80 U/L (ref 40–129)
ALT SERPL W P-5'-P-CCNC: 33 U/L (ref 0–70)
AST SERPL W P-5'-P-CCNC: 25 U/L (ref 0–45)
BILIRUB DIRECT SERPL-MCNC: <0.2 MG/DL (ref 0–0.3)
BILIRUB SERPL-MCNC: 0.5 MG/DL
CREAT SERPL-MCNC: 0.9 MG/DL (ref 0.67–1.17)
EGFRCR SERPLBLD CKD-EPI 2021: >90 ML/MIN/1.73M2
PROT SERPL-MCNC: 6.6 G/DL (ref 6.4–8.3)

## 2023-11-19 NOTE — TELEPHONE ENCOUNTER
Medication:   Leflunomide  Last written on:   1/11/2021  Quantity:   45    Refills:   1    Last office visit:   2/15/2021  Next office visit:   8/16/2021  Last labs:   3/22/2021            
Mychart sent.  Shonna Kenny CMA Rheumatology  7/5/2021 3:48 PM    
Rheumatology team: Please call to notify Mr. Monroy that leflunomide has been refilled.  Labs are needed now.  Eduard Mazariegos MD  7/2/2021 1:14 PM     
No.

## 2023-12-04 ENCOUNTER — LAB (OUTPATIENT)
Dept: LAB | Facility: CLINIC | Age: 63
End: 2023-12-04
Payer: COMMERCIAL

## 2023-12-04 DIAGNOSIS — I10 HYPERTENSION GOAL BP (BLOOD PRESSURE) < 140/90: ICD-10-CM

## 2023-12-04 DIAGNOSIS — Z79.899 HIGH RISK MEDICATION USE: ICD-10-CM

## 2023-12-04 DIAGNOSIS — M05.79 SEROPOSITIVE RHEUMATOID ARTHRITIS OF MULTIPLE SITES (H): ICD-10-CM

## 2023-12-04 DIAGNOSIS — E78.5 HYPERLIPIDEMIA WITH TARGET LDL LESS THAN 130: ICD-10-CM

## 2023-12-04 LAB
ALBUMIN SERPL BCG-MCNC: 4.5 G/DL (ref 3.5–5.2)
ALP SERPL-CCNC: 78 U/L (ref 40–150)
ALT SERPL W P-5'-P-CCNC: 21 U/L (ref 0–70)
ANION GAP SERPL CALCULATED.3IONS-SCNC: 10 MMOL/L (ref 7–15)
AST SERPL W P-5'-P-CCNC: 21 U/L (ref 0–45)
BASOPHILS # BLD AUTO: 0 10E3/UL (ref 0–0.2)
BASOPHILS NFR BLD AUTO: 1 %
BILIRUB DIRECT SERPL-MCNC: <0.2 MG/DL (ref 0–0.3)
BILIRUB SERPL-MCNC: 0.5 MG/DL
BUN SERPL-MCNC: 14.7 MG/DL (ref 8–23)
CALCIUM SERPL-MCNC: 8.9 MG/DL (ref 8.8–10.2)
CHLORIDE SERPL-SCNC: 100 MMOL/L (ref 98–107)
CHOLEST SERPL-MCNC: 244 MG/DL
CREAT SERPL-MCNC: 0.84 MG/DL (ref 0.67–1.17)
CRP SERPL-MCNC: <3 MG/L
DEPRECATED HCO3 PLAS-SCNC: 26 MMOL/L (ref 22–29)
EGFRCR SERPLBLD CKD-EPI 2021: >90 ML/MIN/1.73M2
EOSINOPHIL # BLD AUTO: 0.1 10E3/UL (ref 0–0.7)
EOSINOPHIL NFR BLD AUTO: 1 %
ERYTHROCYTE [DISTWIDTH] IN BLOOD BY AUTOMATED COUNT: 11.8 % (ref 10–15)
ERYTHROCYTE [SEDIMENTATION RATE] IN BLOOD BY WESTERGREN METHOD: 31 MM/HR (ref 0–20)
GLUCOSE SERPL-MCNC: 111 MG/DL (ref 70–99)
HCT VFR BLD AUTO: 42.2 % (ref 40–53)
HDLC SERPL-MCNC: 54 MG/DL
HGB BLD-MCNC: 14.3 G/DL (ref 13.3–17.7)
IMM GRANULOCYTES # BLD: 0 10E3/UL
IMM GRANULOCYTES NFR BLD: 0 %
LDLC SERPL CALC-MCNC: 172 MG/DL
LYMPHOCYTES # BLD AUTO: 1.3 10E3/UL (ref 0.8–5.3)
LYMPHOCYTES NFR BLD AUTO: 30 %
MCH RBC QN AUTO: 32.6 PG (ref 26.5–33)
MCHC RBC AUTO-ENTMCNC: 33.9 G/DL (ref 31.5–36.5)
MCV RBC AUTO: 96 FL (ref 78–100)
MONOCYTES # BLD AUTO: 0.4 10E3/UL (ref 0–1.3)
MONOCYTES NFR BLD AUTO: 8 %
NEUTROPHILS # BLD AUTO: 2.6 10E3/UL (ref 1.6–8.3)
NEUTROPHILS NFR BLD AUTO: 60 %
NONHDLC SERPL-MCNC: 190 MG/DL
PLATELET # BLD AUTO: 205 10E3/UL (ref 150–450)
POTASSIUM SERPL-SCNC: 4.1 MMOL/L (ref 3.4–5.3)
PROT SERPL-MCNC: 6.6 G/DL (ref 6.4–8.3)
RBC # BLD AUTO: 4.39 10E6/UL (ref 4.4–5.9)
SODIUM SERPL-SCNC: 136 MMOL/L (ref 135–145)
TRIGL SERPL-MCNC: 89 MG/DL
WBC # BLD AUTO: 4.4 10E3/UL (ref 4–11)

## 2023-12-04 PROCEDURE — 82248 BILIRUBIN DIRECT: CPT

## 2023-12-04 PROCEDURE — 86140 C-REACTIVE PROTEIN: CPT

## 2023-12-04 PROCEDURE — 36415 COLL VENOUS BLD VENIPUNCTURE: CPT

## 2023-12-04 PROCEDURE — 85025 COMPLETE CBC W/AUTO DIFF WBC: CPT

## 2023-12-04 PROCEDURE — 85652 RBC SED RATE AUTOMATED: CPT

## 2023-12-04 PROCEDURE — 80053 COMPREHEN METABOLIC PANEL: CPT

## 2023-12-04 PROCEDURE — 80061 LIPID PANEL: CPT

## 2023-12-04 NOTE — RESULT ENCOUNTER NOTE
ONCOLOGY/HEMATOLOGY INPATIENT PROGRESS  Patient: Javier Hills Date: 2022   : 1948 Attending: Anthony Gaspar MD   74 year old male      Admitting Diagnosis: Large B-cell lymphoma with CNS involvement, admitted for inpatient chemotherapy     Interval History: Javier denies any new complaints or concerns today. He denies any fatigue, new pain, nausea or vomiting.  His appetite is good. He did not sleep well last night.     ALLERGIES AND MEDICATIONS:   Per Epic review.     PAST MEDICAL, SURGICAL, SOCIAL, AND FAMILY HISTORY:   Reviewed and are unchanged.    Review of Systems:  All systems are negative other than as detailed in HPI (history of present illness) or above.        Vital Last Value 24 Hour Range   Temperature 98.1 °F (36.7 °C) Temp  Min: 97.5 °F (36.4 °C)  Max: 98.1 °F (36.7 °C)   Pulse 68 Pulse  Min: 50  Max: 68   Respiratory 18 Resp  Min: 18  Max: 18   Non-Invasive  Blood Pressure 110/62 (22 1536) BP  Min: 110/62  Max: 147/79   Pulse Oximetry 91 % SpO2  Min: 91 %  Max: 96 %     Vital Today Admitted   Weight 84.1 kg (185 lb 6.5 oz) Weight: 84.1 kg (185 lb 6.5 oz)   Height N/A Height: 5' 10\" (177.8 cm)       Physical Examination:  GENERAL:  Appears in no acute distress.  HEENT: Eyes; Sclerae white, conjunctivae clear, EOMI (extraocular movements intact).   LUNGS: Clear to auscultation bilaterally.  CARDIOVASCULAR: Regular rate and rhythm. No S3, S4 or noted murmurs.  ABDOMEN: Soft, nontender  GENITOURINARY and RECTAL: Deferred.  MUSCULOSKELETAL: Extremities are warm with no clubbing, cyanosis or edema.  NEUROLOGIC: Awake, alert and oriented.     Laboratory Results:  Recent Labs   Lab 22  0936 22  0853   WBC 6.0 4.0*   RBC 3.73* 3.79*   HCT 35.0* 37.6*   HGB 12.3* 12.7*   * 108*     Recent Labs   Lab 22  0936 22  0853 22  1436   SODIUM 140 143  --    POTASSIUM 3.1* 3.6  --    CHLORIDE 102 106  --    CO2 35* 29  --    GLUCOSE 145* 117*  --    BUN 18 18  --   Patient will be coming in soon for office visit and lab/test review.    Yakov Jones MD     CREATININE 0.92 0.95 1.19*   CALCIUM 8.5 8.8  --    ALBUMIN 2.9* 3.3*  --    BILIRUBIN 0.9 0.6  --    ALKPT 61 65  --    AST 17 26  --    GPT 40 50  --        Assessment/Plan:  Large B-cell lymphoma with CNS involvement      Proceed with treatment plan per protocol.   GI prophylaxis: PPI Therapy.    DVT prophylaxis:  SC Lovenox, SCD's and SHENG Stockings.       We will continue to follow and are available for additional questions.  Jonathan Turner, DNP, APRN-BC

## 2023-12-06 ENCOUNTER — OFFICE VISIT (OUTPATIENT)
Dept: RHEUMATOLOGY | Facility: CLINIC | Age: 63
End: 2023-12-06
Payer: COMMERCIAL

## 2023-12-06 VITALS
SYSTOLIC BLOOD PRESSURE: 132 MMHG | BODY MASS INDEX: 24.42 KG/M2 | DIASTOLIC BLOOD PRESSURE: 75 MMHG | HEART RATE: 65 BPM | WEIGHT: 175.4 LBS | OXYGEN SATURATION: 97 %

## 2023-12-06 DIAGNOSIS — M05.79 SEROPOSITIVE RHEUMATOID ARTHRITIS OF MULTIPLE SITES (H): Primary | ICD-10-CM

## 2023-12-06 DIAGNOSIS — Z79.899 HIGH RISK MEDICATION USE: ICD-10-CM

## 2023-12-06 PROCEDURE — 99214 OFFICE O/P EST MOD 30 MIN: CPT | Performed by: INTERNAL MEDICINE

## 2023-12-06 RX ORDER — LEFLUNOMIDE 20 MG/1
20 TABLET ORAL EVERY OTHER DAY
Qty: 45 TABLET | Refills: 2 | Status: SHIPPED | OUTPATIENT
Start: 2023-12-06 | End: 2024-06-06
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
## 2023-12-09 DIAGNOSIS — I10 HYPERTENSION GOAL BP (BLOOD PRESSURE) < 140/90: ICD-10-CM

## 2023-12-10 ASSESSMENT — ENCOUNTER SYMPTOMS
NERVOUS/ANXIOUS: 0
PARESTHESIAS: 0
COUGH: 0
HEMATURIA: 0
WEAKNESS: 0
MYALGIAS: 0
ARTHRALGIAS: 0
ABDOMINAL PAIN: 0
FEVER: 0
HEADACHES: 0
PALPITATIONS: 0
NAUSEA: 0
HEARTBURN: 0
CHILLS: 0
DIZZINESS: 0
EYE PAIN: 0
SORE THROAT: 0
DIARRHEA: 0
JOINT SWELLING: 0
DYSURIA: 0
FREQUENCY: 0
CONSTIPATION: 0
SHORTNESS OF BREATH: 0
HEMATOCHEZIA: 0

## 2023-12-11 ENCOUNTER — OFFICE VISIT (OUTPATIENT)
Dept: FAMILY MEDICINE | Facility: CLINIC | Age: 63
End: 2023-12-11
Payer: COMMERCIAL

## 2023-12-11 VITALS
TEMPERATURE: 98.3 F | OXYGEN SATURATION: 96 % | WEIGHT: 175 LBS | SYSTOLIC BLOOD PRESSURE: 137 MMHG | HEART RATE: 65 BPM | HEIGHT: 71 IN | DIASTOLIC BLOOD PRESSURE: 78 MMHG | BODY MASS INDEX: 24.5 KG/M2

## 2023-12-11 DIAGNOSIS — Z79.899 HIGH RISK MEDICATIONS (NOT ANTICOAGULANTS) LONG-TERM USE: ICD-10-CM

## 2023-12-11 DIAGNOSIS — M05.79 SEROPOSITIVE RHEUMATOID ARTHRITIS OF MULTIPLE SITES (H): ICD-10-CM

## 2023-12-11 DIAGNOSIS — E78.5 HYPERLIPIDEMIA LDL GOAL <100: ICD-10-CM

## 2023-12-11 DIAGNOSIS — R73.01 IMPAIRED FASTING GLUCOSE: ICD-10-CM

## 2023-12-11 DIAGNOSIS — Z23 NEED FOR PNEUMOCOCCAL 20-VALENT CONJUGATE VACCINATION: ICD-10-CM

## 2023-12-11 DIAGNOSIS — I10 HYPERTENSION GOAL BP (BLOOD PRESSURE) < 140/90: ICD-10-CM

## 2023-12-11 DIAGNOSIS — Z00.00 ROUTINE GENERAL MEDICAL EXAMINATION AT A HEALTH CARE FACILITY: Primary | ICD-10-CM

## 2023-12-11 PROCEDURE — 90677 PCV20 VACCINE IM: CPT | Performed by: FAMILY MEDICINE

## 2023-12-11 PROCEDURE — 99396 PREV VISIT EST AGE 40-64: CPT | Mod: 25 | Performed by: FAMILY MEDICINE

## 2023-12-11 PROCEDURE — 90471 IMMUNIZATION ADMIN: CPT | Performed by: FAMILY MEDICINE

## 2023-12-11 PROCEDURE — 99213 OFFICE O/P EST LOW 20 MIN: CPT | Mod: 25 | Performed by: FAMILY MEDICINE

## 2023-12-11 RX ORDER — AMLODIPINE BESYLATE 5 MG/1
5 TABLET ORAL DAILY
Qty: 90 TABLET | Refills: 3 | Status: SHIPPED | OUTPATIENT
Start: 2023-12-11

## 2023-12-11 RX ORDER — LOSARTAN POTASSIUM 50 MG/1
50 TABLET ORAL DAILY
Qty: 90 TABLET | Refills: 3 | Status: SHIPPED | OUTPATIENT
Start: 2023-12-11

## 2023-12-11 RX ORDER — AMLODIPINE BESYLATE 5 MG/1
5 TABLET ORAL DAILY
Qty: 90 TABLET | Refills: 0 | OUTPATIENT
Start: 2023-12-11

## 2023-12-11 RX ORDER — LOSARTAN POTASSIUM 50 MG/1
50 TABLET ORAL DAILY
Qty: 90 TABLET | Refills: 0 | OUTPATIENT
Start: 2023-12-11

## 2023-12-11 ASSESSMENT — ENCOUNTER SYMPTOMS
CONSTIPATION: 0
MYALGIAS: 0
COUGH: 0
DIARRHEA: 0
PALPITATIONS: 0
HEADACHES: 0
FEVER: 0
SHORTNESS OF BREATH: 0
NAUSEA: 0
SORE THROAT: 0
JOINT SWELLING: 0
ABDOMINAL PAIN: 0
ARTHRALGIAS: 0
DYSURIA: 0
FREQUENCY: 0
CHILLS: 0
WEAKNESS: 0
HEMATURIA: 0
NERVOUS/ANXIOUS: 0
HEARTBURN: 0
PARESTHESIAS: 0
DIZZINESS: 0
HEMATOCHEZIA: 0
EYE PAIN: 0

## 2023-12-11 ASSESSMENT — PAIN SCALES - GENERAL: PAINLEVEL: NO PAIN (0)

## 2023-12-11 NOTE — NURSING NOTE
Prior to immunization administration, verified patients identity using patient s name and date of birth. Please see Immunization Activity for additional information.     Screening Questionnaire for Adult Immunization    Are you sick today?   No   Do you have allergies to medications, food, a vaccine component or latex?   Yes   Have you ever had a serious reaction after receiving a vaccination?   No   Do you have a long-term health problem with heart, lung, kidney, or metabolic disease (e.g., diabetes), asthma, a blood disorder, no spleen, complement component deficiency, a cochlear implant, or a spinal fluid leak?  Are you on long-term aspirin therapy?   No   Do you have cancer, leukemia, HIV/AIDS, or any other immune system problem?   No   Do you have a parent, brother, or sister with an immune system problem?   No   In the past 3 months, have you taken medications that affect  your immune system, such as prednisone, other steroids, or anticancer drugs; drugs for the treatment of rheumatoid arthritis, Crohn s disease, or psoriasis; or have you had radiation treatments?   No   Have you had a seizure, or a brain or other nervous system problem?   No   During the past year, have you received a transfusion of blood or blood    products, or been given immune (gamma) globulin or antiviral drug?   No   For women: Are you pregnant or is there a chance you could become       pregnant during the next month?   No   Have you received any vaccinations in the past 4 weeks?   Yes     Immunization questionnaire was positive for at least one answer.  Notified Dr. Jones.  Vis given Prevnar 20    Patient instructed to remain in clinic for 15 minutes afterwards, and to report any adverse reactions.     Screening performed by Nelsy Sears CMA on 12/11/2023 at 7:30 AM.

## 2023-12-11 NOTE — PROGRESS NOTES
SUBJECTIVE:   Jim is a 63 year old, presenting for the following:  Physical and follow-up on baseline health conditions.        12/11/2023     6:52 AM   Additional Questions   Roomed by cam   Accompanied by none         12/11/2023     6:52 AM   Patient Reported Additional Medications   Patient reports taking the following new medications Vit D and Potassium       Healthy Habits:     Getting at least 3 servings of Calcium per day:  Yes    Bi-annual eye exam:  Yes    Dental care twice a year:  Yes    Sleep apnea or symptoms of sleep apnea:  None    Diet:  Regular (no restrictions)    Frequency of exercise:  4-5 days/week    Duration of exercise:  15-30 minutes    Taking medications regularly:  Yes    Barriers to taking medications:  None    Medication side effects:  None    Additional concerns today:  No    He remains on amlodipine and losartan for hypertension.  Those medicines are generally work well for him.  He takes leflunomide for his seronegative rheumatoid arthritis.  That medicine has also worked quite well for him.  He does have a history of hyperlipidemia and impaired fasting glucose.  He came in last week for fasting lab work.  He was on simvastatin many years ago for his hyperlipidemia, but then had a coronary artery calcium scan done in 2018 which came back low and he did not like the date on statins, so he stopped the simvastatin.  He was not really having any problems with it.  He is planning to have a repeat CAC scan done this coming January and may be open to a statin again if he is showing more significant plaque buildup.      Social History     Tobacco Use    Smoking status: Never    Smokeless tobacco: Never   Substance Use Topics    Alcohol use: Yes     Comment: a few drinks on the weekend             12/10/2023     1:52 PM   Alcohol Use   Prescreen: >3 drinks/day or >7 drinks/week? No       Last PSA:   PSA   Date Value Ref Range Status   11/30/2020 1.45 0 - 4 ug/L Final     Comment:     Assay  Method:  Chemiluminescence using Siemens Vista analyzer     Prostate Specific Antigen Screen   Date Value Ref Range Status   12/14/2022 2.03 0.00 - 4.00 ug/L Final       Reviewed orders with patient. Reviewed health maintenance and updated orders accordingly - Yes  Patient Active Problem List   Diagnosis    Presbyopias    Hyperopia    Hyperlipidemia LDL goal <100    Impaired fasting glucose    High risk medications (not anticoagulants) long-term use    Advanced directives, counseling/discussion    Seropositive rheumatoid arthritis of multiple sites (HCC)    Hypertension goal BP (blood pressure) < 140/90    Kidney stone    Shoulder pain, left     Past Surgical History:   Procedure Laterality Date    COLONOSCOPY  11/5/2010    COLONOSCOPY performed by FERMIN MCCLELLAND at [facility]    COLONOSCOPY N/A 1/25/2021    Procedure: COLONOSCOPY;  Surgeon: Anderson Heredia MD;  Location: [facility]    TONSILLECTOMY  1964    AGE 4       Social History     Tobacco Use    Smoking status: Never    Smokeless tobacco: Never   Substance Use Topics    Alcohol use: Yes     Comment: a few drinks on the weekend     Family History   Problem Relation Age of Onset    Lipids Sister     Diabetes Sister     Lipids Father     Hypertension Father     Cerebrovascular Disease Maternal Aunt     Cancer No family hx of          Current Outpatient Medications   Medication Sig Dispense Refill    amLODIPine (NORVASC) 5 MG tablet Take 1 tablet (5 mg) by mouth daily 90 tablet 3    Cholecalciferol (VITAMIN D PO) Take 1,000 Units by mouth      cinnamon 500 MG TABS Take 1 tablet by mouth daily       FISH OIL 1000 MG OR CAPS 1 tablet daily      glucosamine-chondroitin 500-400 MG CAPS per capsule Take 1 capsule by mouth daily      KRILL OIL PO Take 1 tablet by mouth daily      leflunomide (ARAVA) 20 MG tablet Take 1 tablet (20 mg) by mouth every other day .  Labs required every 3 months. 45 tablet 2    losartan (COZAAR) 50 MG tablet Take 1 tablet (50 mg) by mouth daily  "90 tablet 3    Misc Natural Products (TURMERIC CURCUMIN) CAPS Take by mouth daily      MULTI-VITAMIN OR TABS 1 TABLET DAILY      psyllium (METAMUCIL) 30.9 % POWD Take 1 tsp by mouth 2 times daily.      triamcinolone (KENALOG) 0.1 % external cream Apply sparingly to affected area two to three times daily as needed for hand rash 60 g 2    VITAMIN C OR None Entered      VITAMIN E PO        Allergies   Allergen Reactions    Azithromycin Diarrhea       Reviewed and updated as needed this visit by clinical staff    Allergies  Meds              Reviewed and updated as needed this visit by Provider                     Review of Systems   Constitutional:  Negative for chills and fever.   HENT:  Negative for congestion, ear pain, hearing loss and sore throat.    Eyes:  Negative for pain and visual disturbance.   Respiratory:  Negative for cough and shortness of breath.    Cardiovascular:  Negative for chest pain, palpitations and peripheral edema.   Gastrointestinal:  Negative for abdominal pain, constipation, diarrhea, heartburn, hematochezia and nausea.   Genitourinary:  Negative for dysuria, frequency, genital sores, hematuria, impotence, penile discharge and urgency.   Musculoskeletal:  Negative for arthralgias, joint swelling and myalgias.   Skin:  Negative for rash.   Neurological:  Negative for dizziness, weakness, headaches and paresthesias.   Psychiatric/Behavioral:  Negative for mood changes. The patient is not nervous/anxious.          OBJECTIVE:   /78 (BP Location: Left arm, Patient Position: Sitting, Cuff Size: Adult Regular)   Pulse 65   Temp 98.3  F (36.8  C) (Temporal)   Ht 1.807 m (5' 11.14\")   Wt 79.4 kg (175 lb)   SpO2 96%   BMI 24.31 kg/m      Physical Exam  GENERAL: healthy, alert and no distress  EYES: Eyes grossly normal to inspection, PERRL and conjunctivae and sclerae normal  HENT: Grossly normal  NECK: no adenopathy, no asymmetry, masses, or scars and thyroid normal to palpation.  No " carotid bruits.  RESP: lungs clear to auscultation - no rales, rhonchi or wheezes  CV: regular rate and rhythm, normal S1 S2, no S3 or S4, no murmur, click or rub, no peripheral edema and peripheral pulses strong  ABDOMEN: soft, nontender, no hepatosplenomegaly, no masses   MS: no gross musculoskeletal defects noted, no edema  SKIN: no suspicious lesions or rashes  NEURO: Normal strength and tone, mentation intact and speech normal  PSYCH: mentation appears normal, affect normal/bright    Diagnostic Test Results:  Labs reviewed in Epic  Lab on 12/04/2023   Component Date Value Ref Range Status    CRP Inflammation 12/04/2023 <3.00  <5.00 mg/L Final    Erythrocyte Sedimentation Rate 12/04/2023 31 (H)  0 - 20 mm/hr Final    Protein Total 12/04/2023 6.6  6.4 - 8.3 g/dL Final    Albumin 12/04/2023 4.5  3.5 - 5.2 g/dL Final    Bilirubin Total 12/04/2023 0.5  <=1.2 mg/dL Final    Alkaline Phosphatase 12/04/2023 78  40 - 150 U/L Final    Reference intervals for this test were updated on 11/14/2023 to more accurately reflect our healthy population. There may be differences in the flagging of prior results with similar values performed with this method. Interpretation of those prior results can be made in the context of the updated reference intervals.    AST 12/04/2023 21  0 - 45 U/L Final    Reference intervals for this test were updated on 6/12/2023 to more accurately reflect our healthy population. There may be differences in the flagging of prior results with similar values performed with this method. Interpretation of those prior results can be made in the context of the updated reference intervals.    ALT 12/04/2023 21  0 - 70 U/L Final    Reference intervals for this test were updated on 6/12/2023 to more accurately reflect our healthy population. There may be differences in the flagging of prior results with similar values performed with this method. Interpretation of those prior results can be made in the context of  the updated reference intervals.      Bilirubin Direct 12/04/2023 <0.20  0.00 - 0.30 mg/dL Final    Sodium 12/04/2023 136  135 - 145 mmol/L Final    Reference intervals for this test were updated on 09/26/2023 to more accurately reflect our healthy population. There may be differences in the flagging of prior results with similar values performed with this method. Interpretation of those prior results can be made in the context of the updated reference intervals.     Potassium 12/04/2023 4.1  3.4 - 5.3 mmol/L Final    Chloride 12/04/2023 100  98 - 107 mmol/L Final    Carbon Dioxide (CO2) 12/04/2023 26  22 - 29 mmol/L Final    Anion Gap 12/04/2023 10  7 - 15 mmol/L Final    Urea Nitrogen 12/04/2023 14.7  8.0 - 23.0 mg/dL Final    Creatinine 12/04/2023 0.84  0.67 - 1.17 mg/dL Final    GFR Estimate 12/04/2023 >90  >60 mL/min/1.73m2 Final    Calcium 12/04/2023 8.9  8.8 - 10.2 mg/dL Final    Glucose 12/04/2023 111 (H)  70 - 99 mg/dL Final    Cholesterol 12/04/2023 244 (H)  <200 mg/dL Final    Triglycerides 12/04/2023 89  <150 mg/dL Final    Direct Measure HDL 12/04/2023 54  >=40 mg/dL Final    LDL Cholesterol Calculated 12/04/2023 172 (H)  <=100 mg/dL Final    Non HDL Cholesterol 12/04/2023 190 (H)  <130 mg/dL Final    WBC Count 12/04/2023 4.4  4.0 - 11.0 10e3/uL Final    RBC Count 12/04/2023 4.39 (L)  4.40 - 5.90 10e6/uL Final    Hemoglobin 12/04/2023 14.3  13.3 - 17.7 g/dL Final    Hematocrit 12/04/2023 42.2  40.0 - 53.0 % Final    MCV 12/04/2023 96  78 - 100 fL Final    MCH 12/04/2023 32.6  26.5 - 33.0 pg Final    MCHC 12/04/2023 33.9  31.5 - 36.5 g/dL Final    RDW 12/04/2023 11.8  10.0 - 15.0 % Final    Platelet Count 12/04/2023 205  150 - 450 10e3/uL Final    % Neutrophils 12/04/2023 60  % Final    % Lymphocytes 12/04/2023 30  % Final    % Monocytes 12/04/2023 8  % Final    % Eosinophils 12/04/2023 1  % Final    % Basophils 12/04/2023 1  % Final    % Immature Granulocytes 12/04/2023 0  % Final    Absolute  Neutrophils 12/04/2023 2.6  1.6 - 8.3 10e3/uL Final    Absolute Lymphocytes 12/04/2023 1.3  0.8 - 5.3 10e3/uL Final    Absolute Monocytes 12/04/2023 0.4  0.0 - 1.3 10e3/uL Final    Absolute Eosinophils 12/04/2023 0.1  0.0 - 0.7 10e3/uL Final    Absolute Basophils 12/04/2023 0.0  0.0 - 0.2 10e3/uL Final    Absolute Immature Granulocytes 12/04/2023 0.0  <=0.4 10e3/uL Final     The 10-year ASCVD risk score (Akua ROSENTHAL, et al., 2019) is: 15.8%    Values used to calculate the score:      Age: 63 years      Sex: Male      Is Non- : No      Diabetic: No      Tobacco smoker: No      Systolic Blood Pressure: 137 mmHg      Is BP treated: Yes      HDL Cholesterol: 54 mg/dL      Total Cholesterol: 244 mg/dL      ASSESSMENT/PLAN:       ICD-10-CM    1. Routine general medical examination at a health care facility  Z00.00       2. Hypertension goal BP (blood pressure) < 140/90  I10 amLODIPine (NORVASC) 5 MG tablet     losartan (COZAAR) 50 MG tablet      3. Need for pneumococcal 20-valent conjugate vaccination  Z23 PNEUMOCOCCAL 20 VALENT CONJUGATE (PREVNAR 20)      4. Hyperlipidemia LDL goal <100  E78.5       5. Impaired fasting glucose  R73.01       6. Seropositive rheumatoid arthritis of multiple sites (HCC)  M05.79       7. High risk medications (not anticoagulants) long-term use  Z79.899         Blood pressure and other vital signs look acceptable  We again discussed that his 10-year risk of cardiovascular disease is high enough to warrant use of a statin, but he wants to hold on that for now  He will plan to get a heart scan done next month and then reconsider use of a statin depending on that result  He will continue his same baseline meds for now  We will give him a Prevnar 20 today  Continue ongoing care of the rheumatoid arthritis with rheumatology  Plan a tentative recheck in 1 year, or sooner prn      COUNSELING:   Reviewed preventive health counseling, as reflected in patient instructions        Regular exercise       Healthy diet/nutrition       Immunizations  Vaccinated for: Pneumococcal          He reports that he has never smoked. He has never used smokeless tobacco.            Yakov Jones MD  [facility]

## 2024-03-04 ENCOUNTER — TELEPHONE (OUTPATIENT)
Dept: RHEUMATOLOGY | Facility: CLINIC | Age: 64
End: 2024-03-04
Payer: COMMERCIAL

## 2024-03-04 DIAGNOSIS — Z00.00 ROUTINE GENERAL MEDICAL EXAMINATION AT A HEALTH CARE FACILITY: Primary | ICD-10-CM

## 2024-03-04 NOTE — TELEPHONE ENCOUNTER
Tried calling patient twice and I could not get a hold of him. Also could not leave message due to mailbox being full. Will try again in a little bit.     Gely WINTERS RN, [facility] 03/04/24 10:24 AM

## 2024-03-04 NOTE — TELEPHONE ENCOUNTER
[facility] Call Center    Phone Message    May a detailed message be left on voicemail: yes     Reason for Call: Order(s): Other:     Reason for requested: Patient is wanting Dr. Mazariegos to put in an additional order for PSA level to check for Prostate. Patient states his Primary doctor is out on vacation and is wondering if Dr. Mazariegos add the order for patient to have done at his lab appt on 3/6/2024. Patient is wanting to get a call back or sent a [employer] message when this order has been placed. Please advise.     Date needed: asap    Provider name: Yair      Action Taken: Message routed to:  [facility] ([facility] Rheum    Travel Screening: Not Applicable

## 2024-03-04 NOTE — TELEPHONE ENCOUNTER
RN: Please call to notify Bora Monroy that the prostate specific antigen screen test has been ordered.  If this test is abnormal then he will need to follow-up with his primary care provider or see urology.     1. Routine general medical examination at a [facility] facility  - Prostate spec antigen screen; Future      Eduard Mazariegos MD  3/4/2024

## 2024-03-04 NOTE — TELEPHONE ENCOUNTER
Called patient and he is not having any symptoms and he is worried about prostate cancer. He wants to see if Dr. Mazariegos will add it to his labs. His primary is out of the country for three months to [place].     Gely WINTERS RN, [facility] 03/04/24 9:22 AM

## 2024-03-06 ENCOUNTER — LAB (OUTPATIENT)
Dept: LAB | Facility: CLINIC | Age: 64
End: 2024-03-06
Payer: COMMERCIAL

## 2024-03-06 DIAGNOSIS — Z00.00 ROUTINE GENERAL MEDICAL EXAMINATION AT A HEALTH CARE FACILITY: ICD-10-CM

## 2024-03-06 DIAGNOSIS — Z79.899 HIGH RISK MEDICATION USE: ICD-10-CM

## 2024-03-06 DIAGNOSIS — M05.79 SEROPOSITIVE RHEUMATOID ARTHRITIS OF MULTIPLE SITES (H): ICD-10-CM

## 2024-03-06 LAB
BASOPHILS # BLD AUTO: 0 10E3/UL (ref 0–0.2)
BASOPHILS NFR BLD AUTO: 1 %
EOSINOPHIL # BLD AUTO: 0.1 10E3/UL (ref 0–0.7)
EOSINOPHIL NFR BLD AUTO: 1 %
ERYTHROCYTE [DISTWIDTH] IN BLOOD BY AUTOMATED COUNT: 12.1 % (ref 10–15)
HCT VFR BLD AUTO: 39.6 % (ref 40–53)
HGB BLD-MCNC: 13.6 G/DL (ref 13.3–17.7)
IMM GRANULOCYTES # BLD: 0 10E3/UL
IMM GRANULOCYTES NFR BLD: 0 %
LYMPHOCYTES # BLD AUTO: 2.2 10E3/UL (ref 0.8–5.3)
LYMPHOCYTES NFR BLD AUTO: 41 %
MCH RBC QN AUTO: 33.3 PG (ref 26.5–33)
MCHC RBC AUTO-ENTMCNC: 34.3 G/DL (ref 31.5–36.5)
MCV RBC AUTO: 97 FL (ref 78–100)
MONOCYTES # BLD AUTO: 0.5 10E3/UL (ref 0–1.3)
MONOCYTES NFR BLD AUTO: 9 %
NEUTROPHILS # BLD AUTO: 2.6 10E3/UL (ref 1.6–8.3)
NEUTROPHILS NFR BLD AUTO: 48 %
PLATELET # BLD AUTO: 200 10E3/UL (ref 150–450)
RBC # BLD AUTO: 4.09 10E6/UL (ref 4.4–5.9)
WBC # BLD AUTO: 5.3 10E3/UL (ref 4–11)

## 2024-03-06 PROCEDURE — 85025 COMPLETE CBC W/AUTO DIFF WBC: CPT

## 2024-03-06 PROCEDURE — 80076 HEPATIC FUNCTION PANEL: CPT

## 2024-03-06 PROCEDURE — G0103 PSA SCREENING: HCPCS

## 2024-03-06 PROCEDURE — 36415 COLL VENOUS BLD VENIPUNCTURE: CPT

## 2024-03-06 PROCEDURE — 82565 ASSAY OF CREATININE: CPT

## 2024-03-07 LAB
ALBUMIN SERPL BCG-MCNC: 4.6 G/DL (ref 3.5–5.2)
ALP SERPL-CCNC: 77 U/L (ref 40–150)
ALT SERPL W P-5'-P-CCNC: 24 U/L (ref 0–70)
AST SERPL W P-5'-P-CCNC: 21 U/L (ref 0–45)
BILIRUB DIRECT SERPL-MCNC: <0.2 MG/DL (ref 0–0.3)
BILIRUB SERPL-MCNC: 0.3 MG/DL
CREAT SERPL-MCNC: 0.95 MG/DL (ref 0.67–1.17)
EGFRCR SERPLBLD CKD-EPI 2021: 90 ML/MIN/1.73M2
PROT SERPL-MCNC: 6.4 G/DL (ref 6.4–8.3)
PSA SERPL DL<=0.01 NG/ML-MCNC: 2 NG/ML (ref 0–4.5)

## 2024-03-11 NOTE — TELEPHONE ENCOUNTER
Patient labs were completed. Dr. Mazariegos responded to results.     Gely WINTERS RN, [facility] 03/11/24 11:11 AM

## 2024-06-03 ENCOUNTER — LAB (OUTPATIENT)
Dept: LAB | Facility: CLINIC | Age: 64
End: 2024-06-03
Payer: COMMERCIAL

## 2024-06-03 DIAGNOSIS — Z79.899 HIGH RISK MEDICATION USE: ICD-10-CM

## 2024-06-03 DIAGNOSIS — M05.79 SEROPOSITIVE RHEUMATOID ARTHRITIS OF MULTIPLE SITES (H): ICD-10-CM

## 2024-06-03 LAB
BASOPHILS # BLD AUTO: 0 10E3/UL (ref 0–0.2)
BASOPHILS NFR BLD AUTO: 1 %
EOSINOPHIL # BLD AUTO: 0.1 10E3/UL (ref 0–0.7)
EOSINOPHIL NFR BLD AUTO: 1 %
ERYTHROCYTE [DISTWIDTH] IN BLOOD BY AUTOMATED COUNT: 11.9 % (ref 10–15)
ERYTHROCYTE [SEDIMENTATION RATE] IN BLOOD BY WESTERGREN METHOD: 4 MM/HR (ref 0–20)
HCT VFR BLD AUTO: 39.7 % (ref 40–53)
HGB BLD-MCNC: 13.9 G/DL (ref 13.3–17.7)
IMM GRANULOCYTES # BLD: 0 10E3/UL
IMM GRANULOCYTES NFR BLD: 0 %
LYMPHOCYTES # BLD AUTO: 1.6 10E3/UL (ref 0.8–5.3)
LYMPHOCYTES NFR BLD AUTO: 34 %
MCH RBC QN AUTO: 33.3 PG (ref 26.5–33)
MCHC RBC AUTO-ENTMCNC: 35 G/DL (ref 31.5–36.5)
MCV RBC AUTO: 95 FL (ref 78–100)
MONOCYTES # BLD AUTO: 0.4 10E3/UL (ref 0–1.3)
MONOCYTES NFR BLD AUTO: 9 %
NEUTROPHILS # BLD AUTO: 2.6 10E3/UL (ref 1.6–8.3)
NEUTROPHILS NFR BLD AUTO: 56 %
PLATELET # BLD AUTO: 195 10E3/UL (ref 150–450)
RBC # BLD AUTO: 4.18 10E6/UL (ref 4.4–5.9)
WBC # BLD AUTO: 4.6 10E3/UL (ref 4–11)

## 2024-06-03 PROCEDURE — 36415 COLL VENOUS BLD VENIPUNCTURE: CPT

## 2024-06-03 PROCEDURE — 85025 COMPLETE CBC W/AUTO DIFF WBC: CPT

## 2024-06-03 PROCEDURE — 85652 RBC SED RATE AUTOMATED: CPT

## 2024-06-03 PROCEDURE — 80076 HEPATIC FUNCTION PANEL: CPT

## 2024-06-03 PROCEDURE — 86140 C-REACTIVE PROTEIN: CPT

## 2024-06-03 PROCEDURE — 82565 ASSAY OF CREATININE: CPT

## 2024-06-04 LAB
ALBUMIN SERPL BCG-MCNC: 4.5 G/DL (ref 3.5–5.2)
ALP SERPL-CCNC: 86 U/L (ref 40–150)
ALT SERPL W P-5'-P-CCNC: 22 U/L (ref 0–70)
AST SERPL W P-5'-P-CCNC: 28 U/L (ref 0–45)
BILIRUB DIRECT SERPL-MCNC: <0.2 MG/DL (ref 0–0.3)
BILIRUB SERPL-MCNC: 0.4 MG/DL
CREAT SERPL-MCNC: 0.96 MG/DL (ref 0.67–1.17)
CRP SERPL-MCNC: <3 MG/L
EGFRCR SERPLBLD CKD-EPI 2021: 88 ML/MIN/1.73M2
PROT SERPL-MCNC: 6.5 G/DL (ref 6.4–8.3)

## 2024-06-06 ENCOUNTER — OFFICE VISIT (OUTPATIENT)
Dept: RHEUMATOLOGY | Facility: CLINIC | Age: 64
End: 2024-06-06
Payer: COMMERCIAL

## 2024-06-06 VITALS
SYSTOLIC BLOOD PRESSURE: 146 MMHG | HEART RATE: 61 BPM | DIASTOLIC BLOOD PRESSURE: 78 MMHG | RESPIRATION RATE: 14 BRPM | BODY MASS INDEX: 23.64 KG/M2 | OXYGEN SATURATION: 97 % | WEIGHT: 170.2 LBS

## 2024-06-06 DIAGNOSIS — M05.79 SEROPOSITIVE RHEUMATOID ARTHRITIS OF MULTIPLE SITES (H): ICD-10-CM

## 2024-06-06 DIAGNOSIS — Z79.899 HIGH RISK MEDICATION USE: ICD-10-CM

## 2024-06-06 PROCEDURE — 99214 OFFICE O/P EST MOD 30 MIN: CPT | Performed by: INTERNAL MEDICINE

## 2024-06-06 PROCEDURE — G2211 COMPLEX E/M VISIT ADD ON: HCPCS | Performed by: INTERNAL MEDICINE

## 2024-06-06 RX ORDER — LEFLUNOMIDE 20 MG/1
20 TABLET ORAL EVERY OTHER DAY
Qty: 45 TABLET | Refills: 2 | Status: SHIPPED | OUTPATIENT
Start: 2024-06-06

## 2024-06-06 NOTE — PATIENT INSTRUCTIONS
RHEUMATOLOGY    [facility]  [address] [place] [address]  [place] [address]    Phone number: [phone]  Fax number: [phone]    If you need a medication refill, please contact us as you may need lab work and/or a follow up visit prior to your refill.      Thank you for choosing [facility]!    Shonna Kenny CMA Rheumatology

## 2024-06-06 NOTE — NURSING NOTE
Blood pressure rechecked after visit    146/78  Shonna Kenny CMA Rheumatology  6/6/2024                                 RAPID3 (0-30) Cumulative Score  2.0          RAPID3 Weighted Score (divide #4 by 3 and that is the weighted score)  0.6
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
## 2024-07-09 ENCOUNTER — OFFICE VISIT (OUTPATIENT)
Dept: OPTOMETRY | Facility: CLINIC | Age: 64
End: 2024-07-09
Payer: COMMERCIAL

## 2024-07-09 DIAGNOSIS — H52.223 REGULAR ASTIGMATISM OF BOTH EYES: ICD-10-CM

## 2024-07-09 DIAGNOSIS — H52.03 HYPERMETROPIA, BILATERAL: ICD-10-CM

## 2024-07-09 DIAGNOSIS — H52.4 PRESBYOPIA: ICD-10-CM

## 2024-07-09 DIAGNOSIS — H25.13 NUCLEAR AGE-RELATED CATARACT, BOTH EYES: ICD-10-CM

## 2024-07-09 DIAGNOSIS — Z01.01 ENCOUNTER FOR EXAMINATION OF EYES AND VISION WITH ABNORMAL FINDINGS: Primary | ICD-10-CM

## 2024-07-09 PROCEDURE — 92004 COMPRE OPH EXAM NEW PT 1/>: CPT | Performed by: OPTOMETRIST

## 2024-07-09 PROCEDURE — 92015 DETERMINE REFRACTIVE STATE: CPT | Performed by: OPTOMETRIST

## 2024-07-09 ASSESSMENT — CONF VISUAL FIELD
METHOD: COUNTING FINGERS
OS_INFERIOR_NASAL_RESTRICTION: 0
OD_INFERIOR_TEMPORAL_RESTRICTION: 0
OS_SUPERIOR_TEMPORAL_RESTRICTION: 0
OS_SUPERIOR_NASAL_RESTRICTION: 0
OD_NORMAL: 1
OS_INFERIOR_TEMPORAL_RESTRICTION: 0
OS_NORMAL: 1
OD_SUPERIOR_TEMPORAL_RESTRICTION: 0
OD_INFERIOR_NASAL_RESTRICTION: 0
OD_SUPERIOR_NASAL_RESTRICTION: 0

## 2024-07-09 ASSESSMENT — VISUAL ACUITY
OD_SC: 20/200
OS_CC: 20/30-2
OS_SC: 20/250
CORRECTION_TYPE: GLASSES
OD_SC: 20/70
OD_CC: 20/40-1
OS_SC: 20/200
METHOD: SNELLEN - LINEAR
OS_CC: 20/25
OD_SC+: -1
OD_CC: 20/25

## 2024-07-09 ASSESSMENT — REFRACTION_MANIFEST
OS_SPHERE: +2.25
OS_ADD: +2.50
OD_CYLINDER: +0.50
OD_ADD: +2.50
OD_SPHERE: +2.25
OS_CYLINDER: +0.50
OS_AXIS: 065
OD_AXIS: 015

## 2024-07-09 ASSESSMENT — REFRACTION_WEARINGRX
SPECS_TYPE: TRIFOCAL
OD_AXIS: 011
OD_ADD: +2.50
OS_AXIS: 012
OS_ADD: +2.50
OS_CYLINDER: +0.75
OD_CYLINDER: +0.75
OS_SPHERE: +1.75
OD_SPHERE: +1.50

## 2024-07-09 ASSESSMENT — SLIT LAMP EXAM - LIDS
COMMENTS: NORMAL
COMMENTS: NORMAL

## 2024-07-09 ASSESSMENT — EXTERNAL EXAM - LEFT EYE: OS_EXAM: NORMAL

## 2024-07-09 ASSESSMENT — CUP TO DISC RATIO
OD_RATIO: 0.35
OS_RATIO: 0.25

## 2024-07-09 ASSESSMENT — TONOMETRY
OD_IOP_MMHG: 18
IOP_METHOD: APPLANATION
OS_IOP_MMHG: 16

## 2024-07-09 ASSESSMENT — EXTERNAL EXAM - RIGHT EYE: OD_EXAM: NORMAL

## 2024-07-09 NOTE — PROGRESS NOTES
Chief Complaint   Patient presents with    Annual Eye Exam    Cataract    Monitor Current High Risk Meds      -Cataracts each eye     -High risk medication for RA - long term use of Leflunomide - 20mg every other day      Last Eye Exam: 5/26/2021  Dilated Previously: Yes, side effects of dilation explained today     What are you currently using to see?  Glasses - FT TF - wears most of time, also uses +2.50 OTC Readers for TV        Distance Vision Acuity: Noticed gradual change in both eyes     Near Vision Acuity: Not satisfied     Eye Comfort: good  Do you use eye drops? : No  Occupation or Hobbies:  - Demetria Merino  Optometry Assistant          Medical, surgical and family histories reviewed and updated 7/9/2024.       OBJECTIVE: See Ophthalmology exam    ASSESSMENT:    ICD-10-CM    1. Encounter for examination of eyes and vision with abnormal findings  Z01.01       2. Nuclear age-related cataract, both eyes  H25.13       3. Hypermetropia, bilateral  H52.03       4. Regular astigmatism of both eyes  H52.223       5. Presbyopia  H52.4           PLAN:     Patient Instructions   You have the start of mild cataracts.  You may notice some blurred vision or glare with night driving.  It is important that you wear good sunglasses to protect your eyes from the ultraviolet light from the sun.     Updated glasses prescription provided today.   Allow 2 weeks to adapt to the new glasses.     Return in 1 year for a comprehensive eye exam, or sooner if needed.      The effects of the dilating drops last for 4- 6 hours.  You will be more sensitive to light and vision will be blurry up close.  Mydriatic sunglasses were given if needed.     Jay Vargas, OD  [address] [place]  [address]    [phone]

## 2024-07-09 NOTE — PATIENT INSTRUCTIONS
You have the start of mild cataracts.  You may notice some blurred vision or glare with night driving.  It is important that you wear good sunglasses to protect your eyes from the ultraviolet light from the sun.     Updated glasses prescription provided today.   Allow 2 weeks to adapt to the new glasses.     Return in 1 year for a comprehensive eye exam, or sooner if needed.      The effects of the dilating drops last for 4- 6 hours.  You will be more sensitive to light and vision will be blurry up close.  Mydriatic sunglasses were given if needed.     Jay Vargas, OD  [address]  [place]  [address]    [phone]

## 2024-07-09 NOTE — LETTER
7/9/2024      Bora Mnoroy  [address]  [facility] [address]      Dear Colleague,    Thank you for referring your patient, Bora Monroy, to the [facility]. Please see a copy of my visit note below.    Chief Complaint   Patient presents with     Annual Eye Exam     Cataract     Monitor Current High Risk Meds      -Cataracts each eye     -High risk medication for RA - long term use of Leflunomide - 20mg every other day      Last Eye Exam: 5/26/2021  Dilated Previously: Yes, side effects of dilation explained today     What are you currently using to see?  Glasses - FT TF - wears most of time, also uses +2.50 OTC Readers for TV        Distance Vision Acuity: Noticed gradual change in both eyes     Near Vision Acuity: Not satisfied     Eye Comfort: good  Do you use eye drops? : No  Occupation or Hobbies:  - Demetria Merino  Optometry Assistant          Medical, surgical and family histories reviewed and updated 7/9/2024.       OBJECTIVE: See Ophthalmology exam    ASSESSMENT:    ICD-10-CM    1. Encounter for examination of eyes and vision with abnormal findings  Z01.01       2. Nuclear age-related cataract, both eyes  H25.13       3. Hypermetropia, bilateral  H52.03       4. Regular astigmatism of both eyes  H52.223       5. Presbyopia  H52.4           PLAN:     Patient Instructions   You have the start of mild cataracts.  You may notice some blurred vision or glare with night driving.  It is important that you wear good sunglasses to protect your eyes from the ultraviolet light from the sun.     Updated glasses prescription provided today.   Allow 2 weeks to adapt to the new glasses.     Return in 1 year for a comprehensive eye exam, or sooner if needed.      The effects of the dilating drops last for 4- 6 hours.  You will be more sensitive to light and vision will be blurry up close.  Mydriatic sunglasses were given if needed.     Jay SAMPSON  TIERRA Vargas  [address]  [place]  [address]    [phone]       Again, thank you for allowing me to participate in the care of your patient.        Sincerely,        Jay Vargas OD

## 2024-08-12 ENCOUNTER — OFFICE VISIT (OUTPATIENT)
Dept: FAMILY MEDICINE | Facility: CLINIC | Age: 64
End: 2024-08-12
Payer: COMMERCIAL

## 2024-08-12 VITALS
OXYGEN SATURATION: 97 % | TEMPERATURE: 98.5 F | BODY MASS INDEX: 24.36 KG/M2 | SYSTOLIC BLOOD PRESSURE: 125 MMHG | WEIGHT: 174 LBS | HEIGHT: 71 IN | HEART RATE: 60 BPM | DIASTOLIC BLOOD PRESSURE: 73 MMHG | RESPIRATION RATE: 16 BRPM

## 2024-08-12 DIAGNOSIS — M05.79 SEROPOSITIVE RHEUMATOID ARTHRITIS OF MULTIPLE SITES (H): ICD-10-CM

## 2024-08-12 DIAGNOSIS — E78.5 HYPERLIPIDEMIA LDL GOAL <100: ICD-10-CM

## 2024-08-12 DIAGNOSIS — M79.671 INFLAMMATORY PAIN OF RIGHT HEEL: Primary | ICD-10-CM

## 2024-08-12 DIAGNOSIS — I10 HYPERTENSION GOAL BP (BLOOD PRESSURE) < 140/90: ICD-10-CM

## 2024-08-12 PROCEDURE — G2211 COMPLEX E/M VISIT ADD ON: HCPCS | Performed by: FAMILY MEDICINE

## 2024-08-12 PROCEDURE — 99213 OFFICE O/P EST LOW 20 MIN: CPT | Performed by: FAMILY MEDICINE

## 2024-08-12 ASSESSMENT — PAIN SCALES - GENERAL: PAINLEVEL: MILD PAIN (2)

## 2024-08-12 NOTE — PROGRESS NOTES
Assessment & Plan       ICD-10-CM    1. Inflammatory pain of right heel  M79.671 Physical Therapy  Referral      2. Seropositive rheumatoid arthritis of multiple sites (HCC)  M05.79       3. Hypertension goal BP (blood pressure) < 140/90  I10       4. Hyperlipidemia LDL goal <100  E78.5         He appears to have some Achilles tendinitis and perhaps retrocalcaneal bursitis  We reviewed principles of relative rest, ice, stretching, and anti-inflammatory medication  It's already been bothering him for 3 to 4 months despite him doing some of the above measures, so I will also refer him to physical therapy    Blood pressure and other vital signs look good  He will continue his same baseline meds  He will plan to do the coronary artery calcium scan next month    If new, worsening or persistent symptoms, the patient is to call or return for a recheck    The longitudinal plan of care for the diagnosis(es)/condition(s) as documented were addressed during this visit. Due to the added complexity in care, I will continue to support Jim in the subsequent management and with ongoing continuity of care.    Subjective   Jim is a 64 year old, presenting for the following health issues:  Musculoskeletal Problem (Right heel pain x 3 months)        8/12/2024     7:37 AM   Additional Questions   Roomed by cam         8/12/2024     7:37 AM   Patient Reported Additional Medications   Patient reports taking the following new medications none     History of Present Illness       Reason for visit:  To have my right foot heel area examined.  Symptom onset:  More than a month  Symptoms include:  Soreness and burning at times.  Symptom intensity:  Moderate  Symptom progression:  Staying the same  Had these symptoms before:  No  What makes it worse:  Trying to run or change directions quickly.  What makes it better:  Taking some ibuprofen and ice.    He eats 2-3 servings of fruits and vegetables daily.He consumes 0 sweetened  beverage(s) daily.He exercises with enough effort to increase his heart rate 20 to 29 minutes per day.  He exercises with enough effort to increase his heart rate 3 or less days per week.   He is taking medications regularly.  Musculoskeletal Problem       Jim comes in with a 3 to 4-month history of posterior right heel pain.  He is not having pain on the plantar aspect of his right heel.  He has had plantar fasciitis in the past and knows what that feels like, but this is different.  There has been no injury to the right heel.  It is worse with certain activities.  He has good days and bad days.  Some days it really does not bother him much at all.  He does feel better when he takes ibuprofen.  He has also tried to rest and ice it and that seems to help as well.    He does have underlying rheumatoid arthritis and is on leflunomide for that.  He still has not done his coronary artery calcium scan.  He was going to do that earlier this year, but has not gotten around to it yet.  He remains on other medication as below.    Patient Active Problem List   Diagnosis    Presbyopias    Hyperopia    Hyperlipidemia LDL goal <100    Impaired fasting glucose    High risk medications (not anticoagulants) long-term use    Seropositive rheumatoid arthritis of multiple sites (HCC)    Hypertension goal BP (blood pressure) < 140/90    Kidney stone    Shoulder pain, left     Current Outpatient Medications   Medication Sig Dispense Refill    amLODIPine (NORVASC) 5 MG tablet Take 1 tablet (5 mg) by mouth daily 90 tablet 3    Cholecalciferol (VITAMIN D PO) Take 1,000 Units by mouth      cinnamon 500 MG TABS Take 1 tablet by mouth daily       FISH OIL 1000 MG OR CAPS 1 tablet daily      glucosamine-chondroitin 500-400 MG CAPS per capsule Take 1 capsule by mouth daily      KRILL OIL PO Take 1 tablet by mouth daily      leflunomide (ARAVA) 20 MG tablet Take 1 tablet (20 mg) by mouth every other day .  Labs required every 3 months. 45  "tablet 2    losartan (COZAAR) 50 MG tablet Take 1 tablet (50 mg) by mouth daily 90 tablet 3    Misc Natural Products (TURMERIC CURCUMIN) CAPS Take by mouth daily      MULTI-VITAMIN OR TABS 1 TABLET DAILY      psyllium (METAMUCIL) 30.9 % POWD Take 1 tsp by mouth 2 times daily.      triamcinolone (KENALOG) 0.1 % external cream Apply sparingly to affected area two to three times daily as needed for hand rash 60 g 2    VITAMIN C OR None Entered      VITAMIN E PO        No current facility-administered medications for this visit.           Review of Systems  Noncontributory except as above.      Objective    /73 (BP Location: Left arm, Patient Position: Sitting, Cuff Size: Adult Regular)   Pulse 60   Temp 98.5  F (36.9  C) (Temporal)   Resp 16   Ht 1.803 m (5' 11\")   Wt 78.9 kg (174 lb)   SpO2 97%   BMI 24.27 kg/m    Body mass index is 24.27 kg/m .  Physical Exam   GENERAL: alert and no distress  MS: No swelling or erythema or warmth of the right foot or heel.  He has very mild tenderness to palpation over the right posterior heel in the region of the retrocalcaneal bursa and insertion of the Achilles tendon.  No tenderness over the plantar aspect of the right heel.          Signed Electronically by: Yakov Jones MD    "

## 2024-08-12 NOTE — PROGRESS NOTES
{PROVIDER CHARTING PREFERENCE:[id number]}    Subjective   Jim is a 64 year old, presenting for the following health issues:  Musculoskeletal Problem (Right heel pain x 3 months)      8/12/2024     7:37 AM   Additional Questions   Roomed by cam         8/12/2024     7:37 AM   Patient Reported Additional Medications   Patient reports taking the following new medications none     Musculoskeletal Problem    History of Present Illness       Reason for visit:  To have my right foot heel area examined.  Symptom onset:  More than a month  Symptoms include:  Soreness and burning at times.  Symptom intensity:  Moderate  Symptom progression:  Staying the same  Had these symptoms before:  No  What makes it worse:  Trying to run or change directions quickly.  What makes it better:  Taking some ibuprofen and ice.    He eats 2-3 servings of fruits and vegetables daily.He consumes 0 sweetened beverage(s) daily.He exercises with enough effort to increase his heart rate 20 to 29 minutes per day.  He exercises with enough effort to increase his heart rate 3 or less days per week.   He is taking medications regularly.     Jim comes in with a 3 to 4-month history of posterior right heel pain.  No specific injury or trauma.  He is not having pain on the plantar aspect of his heel.  He has had plantar fasciitis in the past and now what that feels like, but this is different.  This pain is in the posterior heel and is worse with certain activities.  He has good days and bad days.  It gets better with ibuprofen.  Rest and ice seem to help as well.  He does have underlying rheumatoid arthritis and is on medication for that as below.    Patient Active Problem List   Diagnosis    Presbyopias    Hyperopia    Hyperlipidemia LDL goal <100    Impaired fasting glucose    High risk medications (not anticoagulants) long-term use    Seropositive rheumatoid arthritis of multiple sites (HCC)    Hypertension goal BP (blood pressure) < 140/90    Kidney  "stone    Shoulder pain, left     Current Outpatient Medications   Medication Sig Dispense Refill    amLODIPine (NORVASC) 5 MG tablet Take 1 tablet (5 mg) by mouth daily 90 tablet 3    Cholecalciferol (VITAMIN D PO) Take 1,000 Units by mouth      cinnamon 500 MG TABS Take 1 tablet by mouth daily       FISH OIL 1000 MG OR CAPS 1 tablet daily      glucosamine-chondroitin 500-400 MG CAPS per capsule Take 1 capsule by mouth daily      KRILL OIL PO Take 1 tablet by mouth daily      leflunomide (ARAVA) 20 MG tablet Take 1 tablet (20 mg) by mouth every other day .  Labs required every 3 months. 45 tablet 2    losartan (COZAAR) 50 MG tablet Take 1 tablet (50 mg) by mouth daily 90 tablet 3    Misc Natural Products (TURMERIC CURCUMIN) CAPS Take by mouth daily      MULTI-VITAMIN OR TABS 1 TABLET DAILY      psyllium (METAMUCIL) 30.9 % POWD Take 1 tsp by mouth 2 times daily.      triamcinolone (KENALOG) 0.1 % external cream Apply sparingly to affected area two to three times daily as needed for hand rash 60 g 2    VITAMIN C OR None Entered      VITAMIN E PO        No current facility-administered medications for this visit.           Review of Systems  Noncontributory except as above.      Objective    /73 (BP Location: Left arm, Patient Position: Sitting, Cuff Size: Adult Regular)   Pulse 60   Temp 98.5  F (36.9  C) (Temporal)   Resp 16   Ht 1.803 m (5' 11\")   Wt 78.9 kg (174 lb)   SpO2 97%   BMI 24.27 kg/m    Body mass index is 24.27 kg/m .  Physical Exam   GENERAL: alert and no distress  MS: He has minimal discomfort to palpation over the right posterior heel in the region of the retrocalcaneal bursa and insertion of the plantar fascia on the heel.  There is no swelling or erythema.  No tenderness on the plantar aspect of his heel.            Signed Electronically by: Yakov Jones MD  {Email feedback regarding this note to [email or web address]   :[id number]}  "

## 2024-09-11 ASSESSMENT — ACTIVITIES OF DAILY LIVING (ADL)
GOING_UP_OR_DOWN_10_STAIRS: NO DIFFICULTY
MAKING_SHARP_TURNS_WHILE_RUNNING_FAST: MODERATE DIFFICULTY
PLEASE_INDICATE_YOR_PRIMARY_REASON_FOR_REFERRAL_TO_THERAPY:: FOOT AND/OR ANKLE
GETTING_INTO_OR_OUT_OF_A_CAR: NO DIFFICULTY
SITTING_FOR_1_HOUR: NO DIFFICULTY
ANY_OF_YOUR_USUAL_WORK,_HOUSEWORK_OR_SCHOOL_ACTIVITIES: MODERATE DIFFICULTY
STANDING_FOR_1_HOUR: NO DIFFICULTY
PERFORMING_LIGHT_ACTIVITIES_AROUND_YOUR_HOME: NO DIFFICULTY
PUTTING_ON_YOUR_SHOES_OR_SOCKS: NO DIFFICULTY
YOUR_USUAL_HOBBIES,_RECREATIONAL_OR_SPORTING_ACTIVITIES: MODERATE DIFFICULTY
WALKING_A_MILE: NO DIFFICULTY
SHOPPING: MODERATE DIFFICULTY
GETTING_INTO_AND_OUT_OF_A_BATH: NO DIFFICULTY
ROLLING_OVER_IN_BED: NO DIFFICULTY
WALKING_BETWEEN_ROOMS: NO DIFFICULTY
PERFORMING_HEAVY_ACTIVITIES_AROUND_YOUR_HOME: A LITTLE BIT OF DIFFICULTY
SQUATTING: NO DIFFICULTY
LEFS_RAW_SCORE: 0
RUNNING_ON_UNEVEN_GROUND: A LITTLE BIT OF DIFFICULTY
RUNNING_ON_EVEN_GROUND: A LITTLE BIT OF DIFFICULTY
LEFS_SCORE(%): 0
LIFTING_AN_OBJECT,_LIKE_A_BAG_OF_GROCERIES_FROM_THE_FLOOR: NO DIFFICULTY
WALKING_2_BLOCKS: NO DIFFICULTY

## 2024-09-12 ENCOUNTER — THERAPY VISIT (OUTPATIENT)
Dept: PHYSICAL THERAPY | Facility: CLINIC | Age: 64
End: 2024-09-12
Attending: FAMILY MEDICINE
Payer: COMMERCIAL

## 2024-09-12 DIAGNOSIS — M79.671 INFLAMMATORY PAIN OF RIGHT HEEL: ICD-10-CM

## 2024-09-12 PROCEDURE — 97161 PT EVAL LOW COMPLEX 20 MIN: CPT | Mod: GP | Performed by: PHYSICAL THERAPIST

## 2024-09-12 PROCEDURE — 97110 THERAPEUTIC EXERCISES: CPT | Mod: GP | Performed by: PHYSICAL THERAPIST

## 2024-09-12 PROCEDURE — 97140 MANUAL THERAPY 1/> REGIONS: CPT | Mod: GP | Performed by: PHYSICAL THERAPIST

## 2024-09-12 NOTE — PROGRESS NOTES
PHYSICAL THERAPY EVALUATION  Type of Visit: Evaluation       Fall Risk Screen:  Fall screen completed by: PT  Have you fallen 2 or more times in the past year?: No  Have you fallen and had an injury in the past year?: No  Is patient a fall risk?: No    Subjective       Presenting condition or subjective complaint: My right heel gets sore and buns at times.  Started in April of this year.  Date of onset: 08/12/24 (date of order)    Relevant medical history: High blood pressure; Rheumatoid arthritis   Dates & types of surgery: Tonsils at 4 years old.    Prior diagnostic imaging/testing results:       Prior therapy history for the same diagnosis, illness or injury: No      Prior Level of Function  Transfers: Independent  Ambulation: Independent  ADL: Independent  IADL: Childcare, Driving, Finances, Housekeeping, Laundry, Meal preparation, Work, Yard work    Living Environment  Social support: With a significant other or spouse   Type of home: House   Stairs to enter the home: No       Ramp: No   Stairs inside the home: Yes 7 Is there a railing: Yes     Help at home: None  Equipment owned:       Employment: Yes  for [employer]  Hobbies/Interests: Fishing, going for walks and playing with my grand kids.    Patient goals for therapy: Run with my grand kids again.    Pt presents to therapy today for chief complaint of right lower leg/heel pain starting in April  of this year with insidious onset. Most pain is right on the achilles insertion. Tried ising and massage and that has helped some but more painful along the sides of the ankle now and does occasionally get some burning. Some days are feeling pretty good and other days quite sore. Tried ibuprofen for about a week and it did help but only while he was taking it.  Some increase in sharper pains with running with his grand kids more recently.  Works a lot of stairs and ladders at work and that generally irritates things.     Pain assessment: Pain  present     Objective   FOOT/ANKLE EVALUATION  PAIN: Pain Level at Rest: 0/10  Pain Level with Use: 8/10  Pain Location: ankle  Pain Quality: Aching, Burning, and Sharp  Pain Frequency: intermittent  Pain is Worst: daytime  Pain is Exacerbated By: stairs, ladders,   Pain is Relieved By: cold and rest  Pain Progression: Worsened  INTEGUMENTARY (edema, incisions): WNL  POSTURE: WFL  GAIT:   Weightbearing Status: WBAT  Assistive Device(s): None  Gait Deviations: WNL  BALANCE/PROPRIOCEPTION: WFL  WEIGHT BEARING ALIGNMENT: Foot/Ankle WB Alignment:Calcaneal valgus R  NON-WEIGHTBEARING ALIGNMENT:    ROM:   (Degrees) Left AROM Left PROM  Right AROM Right PROM   Ankle Dorsiflexion   5    Ankle Plantarflexion   57    Ankle Inversion   28    Ankle Eversion   34    Great Toe Flexion       Great Toe Extension       Pain:   End feel:     STRENGTH: WNL  FLEXIBILITY: Decreased gastroc R, Decreased soleus R  SPECIAL TESTS:    Left Right   Tinel Sign     Ren Sign     Windlass Test     Ligamentous Stability     Anterior Drawer  Negative,     Kleiger ER Test  Negative,     Longitudinal Arch Angle Test     Talar Tilt       FUNCTIONAL TESTS:   PALPATION:   + Tenderness at Location Left Right   Gastroc/Soleus  +   Achilles Tendon  +   Anterior Tibialis     Posterior Tibialis     Incisional     Deltoid Ligament     Plantar Fascia     Navicular     Medial Calcaneal     Peroneals     Anterior Talofibular Ligament     Posterior Talofibular Ligament     Calcaneofibular Ligament     Medial Malleolus     Lateral Malleolus     Anterior Tibiofibular Ligament     Posterior Tibiofibular Ligament       JOINT MOBILITY:    Left Right   Tib-Fib Proximal     Tib-Fib Distal     Talocrural  Hypomobile   Subtalar     Midtarsal     First Ray         Assessment & Plan   CLINICAL IMPRESSIONS  Medical Diagnosis: M79.671 (ICD-10-CM) - Inflammatory pain of right heel    Treatment Diagnosis: right achilles tendinosis   Impression/Assessment: Patient is a 64  year old male with right ankle/heel complaints.  The following significant findings have been identified: Pain, Decreased ROM/flexibility, Decreased joint mobility, Inflammation, Impaired muscle performance, and Decreased activity tolerance. These impairments interfere with their ability to perform self care tasks, work tasks, recreational activities, household chores, driving , and community mobility as compared to previous level of function.     Clinical Decision Making (Complexity):  Clinical Presentation: Stable/Uncomplicated  Clinical Presentation Rationale: based on medical and personal factors listed in PT evaluation  Clinical Decision Making (Complexity): Low complexity    PLAN OF CARE  Treatment Interventions:  Modalities: Cryotherapy, Dry Needling, E-stim, Hot Pack, Ultrasound, Vasoneumatic Device  Interventions: Gait Training, Manual Therapy, Neuromuscular Re-education, Therapeutic Activity, Therapeutic Exercise, Self-Care/Home Management    Long Term Goals     PT Goal 1  Goal Identifier: goal 1  Goal Description: pain free with driving for up to 2 hours  Rationale: to maximize safety and independence with performance of ADLs and functional tasks;to maximize safety and independence within the home;to maximize safety and independence within the community;to maximize safety and independence with transportation;to maximize safety and independence with self cares  Target Date: 10/24/24  PT Goal 2  Goal Identifier: goal 2  Goal Description: pain free with jogging with his grandkids for up to 10 minutes  Rationale: to maximize safety and independence with performance of ADLs and functional tasks;to maximize safety and independence within the home;to maximize safety and independence within the community;to maximize safety and independence with transportation;to maximize safety and independence with self cares  Target Date: 11/07/24      Frequency of Treatment: 1x/week  Duration of Treatment: 6-8  weeks    Recommended Referrals to Other Professionals:   Education Assessment:   Learner/Method: Patient;Demonstration;Pictures/Video;No Barriers to Learning    Risks and benefits of evaluation/treatment have been explained.   Patient/Family/caregiver agrees with Plan of Care.     Evaluation Time:     PT Nano Low Complexity Minutes (82604): 15   Present: Not applicable     Signing Clinician: Kaushik Bal, PT

## 2024-09-19 ENCOUNTER — THERAPY VISIT (OUTPATIENT)
Dept: PHYSICAL THERAPY | Facility: CLINIC | Age: 64
End: 2024-09-19
Attending: FAMILY MEDICINE
Payer: COMMERCIAL

## 2024-09-19 DIAGNOSIS — M76.61 INSERTIONAL TENDINOPATHY OF RIGHT ACHILLES TENDON: Primary | ICD-10-CM

## 2024-09-19 PROCEDURE — 97140 MANUAL THERAPY 1/> REGIONS: CPT | Mod: GP | Performed by: PHYSICAL THERAPIST

## 2024-09-19 PROCEDURE — 97110 THERAPEUTIC EXERCISES: CPT | Mod: GP | Performed by: PHYSICAL THERAPIST

## 2024-09-19 PROCEDURE — 97035 APP MDLTY 1+ULTRASOUND EA 15: CPT | Mod: GP | Performed by: PHYSICAL THERAPIST

## 2024-09-23 ENCOUNTER — LAB (OUTPATIENT)
Dept: LAB | Facility: CLINIC | Age: 64
End: 2024-09-23
Payer: COMMERCIAL

## 2024-09-23 DIAGNOSIS — Z79.899 HIGH RISK MEDICATION USE: ICD-10-CM

## 2024-09-23 DIAGNOSIS — M05.79 SEROPOSITIVE RHEUMATOID ARTHRITIS OF MULTIPLE SITES (H): ICD-10-CM

## 2024-09-23 LAB
BASOPHILS # BLD AUTO: 0 10E3/UL (ref 0–0.2)
BASOPHILS NFR BLD AUTO: 1 %
EOSINOPHIL # BLD AUTO: 0 10E3/UL (ref 0–0.7)
EOSINOPHIL NFR BLD AUTO: 1 %
ERYTHROCYTE [DISTWIDTH] IN BLOOD BY AUTOMATED COUNT: 11.9 % (ref 10–15)
HCT VFR BLD AUTO: 39.1 % (ref 40–53)
HGB BLD-MCNC: 13.6 G/DL (ref 13.3–17.7)
IMM GRANULOCYTES # BLD: 0 10E3/UL
IMM GRANULOCYTES NFR BLD: 0 %
LYMPHOCYTES # BLD AUTO: 1.5 10E3/UL (ref 0.8–5.3)
LYMPHOCYTES NFR BLD AUTO: 35 %
MCH RBC QN AUTO: 33.3 PG (ref 26.5–33)
MCHC RBC AUTO-ENTMCNC: 34.8 G/DL (ref 31.5–36.5)
MCV RBC AUTO: 96 FL (ref 78–100)
MONOCYTES # BLD AUTO: 0.4 10E3/UL (ref 0–1.3)
MONOCYTES NFR BLD AUTO: 9 %
NEUTROPHILS # BLD AUTO: 2.3 10E3/UL (ref 1.6–8.3)
NEUTROPHILS NFR BLD AUTO: 55 %
PLATELET # BLD AUTO: 203 10E3/UL (ref 150–450)
RBC # BLD AUTO: 4.09 10E6/UL (ref 4.4–5.9)
WBC # BLD AUTO: 4.2 10E3/UL (ref 4–11)

## 2024-09-23 PROCEDURE — 82565 ASSAY OF CREATININE: CPT

## 2024-09-23 PROCEDURE — 85025 COMPLETE CBC W/AUTO DIFF WBC: CPT

## 2024-09-23 PROCEDURE — 80076 HEPATIC FUNCTION PANEL: CPT

## 2024-09-23 PROCEDURE — 36415 COLL VENOUS BLD VENIPUNCTURE: CPT

## 2024-09-24 LAB
ALBUMIN SERPL BCG-MCNC: 4.5 G/DL (ref 3.5–5.2)
ALP SERPL-CCNC: 74 U/L (ref 40–150)
ALT SERPL W P-5'-P-CCNC: 23 U/L (ref 0–70)
AST SERPL W P-5'-P-CCNC: 25 U/L (ref 0–45)
BILIRUB DIRECT SERPL-MCNC: <0.2 MG/DL (ref 0–0.3)
BILIRUB SERPL-MCNC: 0.4 MG/DL
CREAT SERPL-MCNC: 0.84 MG/DL (ref 0.67–1.17)
EGFRCR SERPLBLD CKD-EPI 2021: >90 ML/MIN/1.73M2
PROT SERPL-MCNC: 6.6 G/DL (ref 6.4–8.3)

## 2024-09-27 ENCOUNTER — THERAPY VISIT (OUTPATIENT)
Dept: PHYSICAL THERAPY | Facility: CLINIC | Age: 64
End: 2024-09-27
Attending: FAMILY MEDICINE
Payer: COMMERCIAL

## 2024-09-27 DIAGNOSIS — M76.61 INSERTIONAL TENDINOPATHY OF RIGHT ACHILLES TENDON: Primary | ICD-10-CM

## 2024-09-27 PROCEDURE — 97110 THERAPEUTIC EXERCISES: CPT | Mod: GP | Performed by: PHYSICAL THERAPIST

## 2024-09-27 PROCEDURE — 97140 MANUAL THERAPY 1/> REGIONS: CPT | Mod: GP | Performed by: PHYSICAL THERAPIST

## 2024-10-15 ENCOUNTER — THERAPY VISIT (OUTPATIENT)
Dept: PHYSICAL THERAPY | Facility: CLINIC | Age: 64
End: 2024-10-15
Payer: COMMERCIAL

## 2024-10-15 DIAGNOSIS — M76.61 INSERTIONAL TENDINOPATHY OF RIGHT ACHILLES TENDON: Primary | ICD-10-CM

## 2024-10-15 PROCEDURE — 97140 MANUAL THERAPY 1/> REGIONS: CPT | Mod: GP | Performed by: PHYSICAL THERAPIST

## 2024-11-05 ENCOUNTER — THERAPY VISIT (OUTPATIENT)
Dept: PHYSICAL THERAPY | Facility: CLINIC | Age: 64
End: 2024-11-05
Payer: COMMERCIAL

## 2024-11-05 DIAGNOSIS — M76.61 INSERTIONAL TENDINOPATHY OF RIGHT ACHILLES TENDON: Primary | ICD-10-CM

## 2024-11-05 PROCEDURE — 97140 MANUAL THERAPY 1/> REGIONS: CPT | Mod: GP | Performed by: PHYSICAL THERAPIST

## 2024-11-15 ENCOUNTER — THERAPY VISIT (OUTPATIENT)
Dept: PHYSICAL THERAPY | Facility: CLINIC | Age: 64
End: 2024-11-15
Payer: COMMERCIAL

## 2024-11-15 DIAGNOSIS — M76.61 INSERTIONAL TENDINOPATHY OF RIGHT ACHILLES TENDON: Primary | ICD-10-CM

## 2024-11-15 PROCEDURE — 97140 MANUAL THERAPY 1/> REGIONS: CPT | Mod: GP | Performed by: PHYSICAL THERAPIST

## 2024-12-04 ENCOUNTER — LAB (OUTPATIENT)
Dept: LAB | Facility: CLINIC | Age: 64
End: 2024-12-04
Payer: COMMERCIAL

## 2024-12-04 DIAGNOSIS — Z12.5 SCREENING FOR PROSTATE CANCER: ICD-10-CM

## 2024-12-04 DIAGNOSIS — R73.01 IMPAIRED FASTING GLUCOSE: ICD-10-CM

## 2024-12-04 DIAGNOSIS — E78.5 HYPERLIPIDEMIA LDL GOAL <100: ICD-10-CM

## 2024-12-04 LAB
ANION GAP SERPL CALCULATED.3IONS-SCNC: 8 MMOL/L (ref 7–15)
BUN SERPL-MCNC: 14.1 MG/DL (ref 8–23)
CALCIUM SERPL-MCNC: 9 MG/DL (ref 8.8–10.4)
CHLORIDE SERPL-SCNC: 98 MMOL/L (ref 98–107)
CHOLEST SERPL-MCNC: 222 MG/DL
CREAT SERPL-MCNC: 0.89 MG/DL (ref 0.67–1.17)
EGFRCR SERPLBLD CKD-EPI 2021: >90 ML/MIN/1.73M2
EST. AVERAGE GLUCOSE BLD GHB EST-MCNC: 111 MG/DL
FASTING STATUS PATIENT QL REPORTED: YES
FASTING STATUS PATIENT QL REPORTED: YES
GLUCOSE SERPL-MCNC: 99 MG/DL (ref 70–99)
HBA1C MFR BLD: 5.5 % (ref 0–5.6)
HCO3 SERPL-SCNC: 28 MMOL/L (ref 22–29)
HDLC SERPL-MCNC: 44 MG/DL
LDLC SERPL CALC-MCNC: 165 MG/DL
NONHDLC SERPL-MCNC: 178 MG/DL
POTASSIUM SERPL-SCNC: 4 MMOL/L (ref 3.4–5.3)
PSA SERPL DL<=0.01 NG/ML-MCNC: 2.55 NG/ML (ref 0–4.5)
SODIUM SERPL-SCNC: 134 MMOL/L (ref 135–145)
TRIGL SERPL-MCNC: 65 MG/DL

## 2024-12-04 PROCEDURE — 80048 BASIC METABOLIC PNL TOTAL CA: CPT

## 2024-12-04 PROCEDURE — 83036 HEMOGLOBIN GLYCOSYLATED A1C: CPT

## 2024-12-04 PROCEDURE — 80061 LIPID PANEL: CPT

## 2024-12-04 PROCEDURE — 36415 COLL VENOUS BLD VENIPUNCTURE: CPT

## 2024-12-04 PROCEDURE — G0103 PSA SCREENING: HCPCS

## 2024-12-05 ENCOUNTER — OFFICE VISIT (OUTPATIENT)
Dept: RHEUMATOLOGY | Facility: CLINIC | Age: 64
End: 2024-12-05
Payer: COMMERCIAL

## 2024-12-05 VITALS
BODY MASS INDEX: 23.71 KG/M2 | OXYGEN SATURATION: 98 % | WEIGHT: 170 LBS | SYSTOLIC BLOOD PRESSURE: 134 MMHG | DIASTOLIC BLOOD PRESSURE: 73 MMHG | HEART RATE: 65 BPM

## 2024-12-05 DIAGNOSIS — M05.79 SEROPOSITIVE RHEUMATOID ARTHRITIS OF MULTIPLE SITES (H): Primary | ICD-10-CM

## 2024-12-05 DIAGNOSIS — Z79.899 HIGH RISK MEDICATION USE: ICD-10-CM

## 2024-12-05 RX ORDER — LEFLUNOMIDE 20 MG/1
20 TABLET ORAL EVERY OTHER DAY
Qty: 45 TABLET | Refills: 2 | Status: SHIPPED | OUTPATIENT
Start: 2024-12-05
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
## 2024-12-08 DIAGNOSIS — I10 HYPERTENSION GOAL BP (BLOOD PRESSURE) < 140/90: ICD-10-CM

## 2024-12-09 RX ORDER — LOSARTAN POTASSIUM 50 MG/1
50 TABLET ORAL DAILY
Qty: 90 TABLET | Refills: 0 | Status: SHIPPED | OUTPATIENT
Start: 2024-12-09

## 2025-02-18 NOTE — NURSING NOTE
"Chief Complaint   Patient presents with     Physical     Health Maintenance     Flu shot at work       Initial /73 (BP Location: Right arm, Patient Position: Chair, Cuff Size: Adult Regular)  Pulse 64  Temp 98.3  F (36.8  C) (Oral)  Ht 5' 11\" (1.803 m)  Wt 161 lb (73 kg)  SpO2 98%  BMI 22.45 kg/m2 Estimated body mass index is 22.45 kg/(m^2) as calculated from the following:    Height as of this encounter: 5' 11\" (1.803 m).    Weight as of this encounter: 161 lb (73 kg).  Medication Reconciliation: complete   Polly Henriquez CMA       "
2 d n/v/d, diffuse abd discomfort. soft mild diffuse tender abd so labs/ct ordered. given gi cocktail. symptomatically improved. imaging/ labs neg for acute pathology. home w prn zofran for suspected acute gastroenteritis. return precautions discussed

## 2025-03-05 ENCOUNTER — LAB (OUTPATIENT)
Dept: LAB | Facility: CLINIC | Age: 65
End: 2025-03-05
Payer: COMMERCIAL

## 2025-03-05 DIAGNOSIS — Z79.899 HIGH RISK MEDICATION USE: ICD-10-CM

## 2025-03-05 DIAGNOSIS — M05.79 SEROPOSITIVE RHEUMATOID ARTHRITIS OF MULTIPLE SITES (H): ICD-10-CM

## 2025-03-05 LAB
ALBUMIN SERPL BCG-MCNC: 4.5 G/DL (ref 3.5–5.2)
ALP SERPL-CCNC: 81 U/L (ref 40–150)
ALT SERPL W P-5'-P-CCNC: 31 U/L (ref 0–70)
AST SERPL W P-5'-P-CCNC: 27 U/L (ref 0–45)
BASOPHILS # BLD AUTO: 0.1 10E3/UL (ref 0–0.2)
BASOPHILS NFR BLD AUTO: 1 %
BILIRUB DIRECT SERPL-MCNC: 0.09 MG/DL (ref 0–0.3)
BILIRUB SERPL-MCNC: 0.2 MG/DL
CREAT SERPL-MCNC: 1.01 MG/DL (ref 0.67–1.17)
EGFRCR SERPLBLD CKD-EPI 2021: 83 ML/MIN/1.73M2
EOSINOPHIL # BLD AUTO: 0.1 10E3/UL (ref 0–0.7)
EOSINOPHIL NFR BLD AUTO: 1 %
ERYTHROCYTE [DISTWIDTH] IN BLOOD BY AUTOMATED COUNT: 11.8 % (ref 10–15)
HCT VFR BLD AUTO: 39.9 % (ref 40–53)
HGB BLD-MCNC: 13.7 G/DL (ref 13.3–17.7)
IMM GRANULOCYTES # BLD: 0 10E3/UL
IMM GRANULOCYTES NFR BLD: 0 %
LYMPHOCYTES # BLD AUTO: 1.9 10E3/UL (ref 0.8–5.3)
LYMPHOCYTES NFR BLD AUTO: 33 %
MCH RBC QN AUTO: 32.2 PG (ref 26.5–33)
MCHC RBC AUTO-ENTMCNC: 34.3 G/DL (ref 31.5–36.5)
MCV RBC AUTO: 94 FL (ref 78–100)
MONOCYTES # BLD AUTO: 0.5 10E3/UL (ref 0–1.3)
MONOCYTES NFR BLD AUTO: 9 %
NEUTROPHILS # BLD AUTO: 3.2 10E3/UL (ref 1.6–8.3)
NEUTROPHILS NFR BLD AUTO: 56 %
NRBC # BLD AUTO: 0 10E3/UL
NRBC BLD AUTO-RTO: 0 /100
PLATELET # BLD AUTO: 232 10E3/UL (ref 150–450)
PROT SERPL-MCNC: 6.6 G/DL (ref 6.4–8.3)
RBC # BLD AUTO: 4.25 10E6/UL (ref 4.4–5.9)
WBC # BLD AUTO: 5.7 10E3/UL (ref 4–11)

## 2025-03-05 PROCEDURE — 36415 COLL VENOUS BLD VENIPUNCTURE: CPT

## 2025-03-05 PROCEDURE — 85025 COMPLETE CBC W/AUTO DIFF WBC: CPT

## 2025-03-05 PROCEDURE — 82565 ASSAY OF CREATININE: CPT

## 2025-03-05 PROCEDURE — 80076 HEPATIC FUNCTION PANEL: CPT

## 2025-04-15 ENCOUNTER — TELEPHONE (OUTPATIENT)
Dept: FAMILY MEDICINE | Facility: CLINIC | Age: 65
End: 2025-04-15
Payer: COMMERCIAL

## 2025-04-15 DIAGNOSIS — R22.40 ANKLE MASS, UNSPECIFIED LATERALITY: Primary | ICD-10-CM

## 2025-04-15 NOTE — TELEPHONE ENCOUNTER
Order/Referral Request    Who is requesting: Patient     Orders being requested: Orthopedic Specialist     Reason service is needed/diagnosis: Lump on back of right heal     When are orders needed by: ASAP     Has this been discussed with Provider: Yes    Does patient have a preference on a Group/Provider/Facility? [facility]     Does patient have an appointment scheduled?: No    Where to send orders: Place orders within Epic    Could we send this information to you in Drivr or would you prefer to receive a phone call?:   Patient would like to be contacted via D[employer]r

## 2025-04-15 NOTE — TELEPHONE ENCOUNTER
Okay, I made a referral for him as requested.  Someone from the orthopedic scheduling department should call him in the next day or 2 to help him set up an appointment.    Yakov Jones MD

## 2025-04-16 ENCOUNTER — PATIENT OUTREACH (OUTPATIENT)
Dept: CARE COORDINATION | Facility: CLINIC | Age: 65
End: 2025-04-16
Payer: COMMERCIAL

## 2025-05-02 ENCOUNTER — ANCILLARY PROCEDURE (OUTPATIENT)
Dept: GENERAL RADIOLOGY | Facility: CLINIC | Age: 65
End: 2025-05-02
Attending: PODIATRIST
Payer: COMMERCIAL

## 2025-05-02 ENCOUNTER — OFFICE VISIT (OUTPATIENT)
Dept: PODIATRY | Facility: CLINIC | Age: 65
End: 2025-05-02
Attending: FAMILY MEDICINE
Payer: COMMERCIAL

## 2025-05-02 VITALS — HEIGHT: 72 IN | WEIGHT: 165 LBS | BODY MASS INDEX: 22.35 KG/M2

## 2025-05-02 DIAGNOSIS — R22.40 ANKLE MASS, UNSPECIFIED LATERALITY: ICD-10-CM

## 2025-05-02 DIAGNOSIS — M76.61 INSERTIONAL TENDINOPATHY OF RIGHT ACHILLES TENDON: ICD-10-CM

## 2025-05-02 DIAGNOSIS — R22.40 ANKLE MASS, UNSPECIFIED LATERALITY: Primary | ICD-10-CM

## 2025-05-02 PROCEDURE — 99203 OFFICE O/P NEW LOW 30 MIN: CPT | Performed by: PODIATRIST

## 2025-05-02 PROCEDURE — 73650 X-RAY EXAM OF HEEL: CPT | Mod: TC | Performed by: RADIOLOGY

## 2025-05-02 ASSESSMENT — PAIN SCALES - GENERAL: PAINLEVEL_OUTOF10: MILD PAIN (1)

## 2025-05-02 NOTE — Clinical Note
5/2/2025      Bora Monroy  [address] [facility] [address]      Dear Colleague,    Thank you for referring your patient, Bora Monroy, to the [facility]. Please see a copy of my visit note below.    PATIENT HISTORY:  Bora Monroy is a 65 year old male who presents to clinic {FSOC CONSULT:263416} with a chief complaint of ***.  The patient is seen {sjaparent:985420}.  The patient relates the pain is primarily located around the ***.  {Precipitating Event:371838}  The patient relates that the symptoms have been going on for several {DAYS:108420}.  The patient has previously tried different shoes, *** with little relief.  The patient is {FSOCWORK:965118}.  Any previous notes and studies that pertain to the patient's condition were reviewed.    Pertinent medical, surgical and family history was reviewed in the Epic chart.    Past Medical History:   Past Medical History:   Diagnosis Date    Hyperlipidemia LDL goal < 130     declines statins -- favorable CAC in 2/18    Hypertension goal BP (blood pressure) < 140/90 11/17/2015    NL stress test 4/1/16, med started 11/16    Impaired fasting glucose     Kidney stone 12/8/2016    Seropositive rheumatoid arthritis of multiple sites (H) 11/17/2015       Medications:   Current Outpatient Medications:     amLODIPine (NORVASC) 5 MG tablet, Take 1 tablet (5 mg) by mouth daily., Disp: 90 tablet, Rfl: 3    Cholecalciferol (VITAMIN D PO), Take 1,000 Units by mouth, Disp: , Rfl:     cinnamon 500 MG TABS, Take 1 tablet by mouth daily , Disp: , Rfl:     FISH OIL 1000 MG OR CAPS, 1 tablet daily, Disp: , Rfl:     glucosamine-chondroitin 500-400 MG CAPS per capsule, Take 1 capsule by mouth daily, Disp: , Rfl:     KRILL OIL PO, Take 1 tablet by mouth daily, Disp: , Rfl:     leflunomide (ARAVA) 20 MG tablet, Take 1 tablet (20 mg) by mouth every other day. .  Labs required every 3 months., Disp: 45 tablet, Rfl: 2    losartan (COZAAR)  50 MG tablet, Take 1 tablet (50 mg) by mouth daily., Disp: 90 tablet, Rfl: 3    Misc Natural Products (TURMERIC CURCUMIN) CAPS, Take by mouth daily, Disp: , Rfl:     MULTI-VITAMIN OR TABS, 1 TABLET DAILY, Disp: , Rfl:     psyllium (METAMUCIL) 30.9 % POWD, Take 1 tsp by mouth 2 times daily., Disp: , Rfl:     simvastatin (ZOCOR) 20 MG tablet, Take 1 tablet (20 mg) by mouth at bedtime., Disp: 90 tablet, Rfl: 3    triamcinolone (KENALOG) 0.1 % external cream, Apply sparingly to affected area two to three times daily as needed for hand rash, Disp: 60 g, Rfl: 2    VITAMIN C OR, None Entered, Disp: , Rfl:     VITAMIN E PO, , Disp: , Rfl:      Allergies:    Allergies   Allergen Reactions    Wool Fiber     Azithromycin Diarrhea         LOWER EXTREMITY PHYSICAL EXAM    Dermatologic: Skin is intact to {RIGHT /LEFT:317094} lower extremity without significant lesions, rash or abrasion.        Vascular: DP & PT pulses are intact & regular on the {RIGHT /LEFT:655772}.   CFT and skin temperature is normal to the {RIGHT /LEFT:730255} lower extremity.     Neurologic: Lower extremity sensation is intact to light touch.  No evidence of weakness in the {RIGHT /LEFT:504537} lower extremity.        Musculoskeletal: Patient is ambulatory without assistive device or brace.  No gross ankle deformity noted.  No foot or ankle joint effusion is noted.  Noted ***    Diagnostics:  Radiographs included three views of the {RIGHT LEFT BOTH NO:342865} foot demonstrating *** no cortical erosions or periosteal elevation.  All joint margins appear stable.  There is no apparent fracture or tumor formation noted.  There is no evidence of foreign body.  The images were independently reviewed by myself along with the patient explaining the findings.      ASSESSMENT / PLAN:   No diagnosis found.    I have explained to Bora about the conditions.  We discussed the underlying contributing factors to the condition as well as both conservative and surgical  treatment options along with expected length of recovery.  At this time, ***    Bora verbalized agreement with and understanding of the rational for the diagnosis and treatment plan.  All questions were answered to best of my ability and the patient's satisfaction. The patient was advised to contact the clinic with any questions that may arise after the clinic visit.      Disclaimer: This note consists of symbols derived from keyboarding, dictation and/or voice recognition software. As a result, there may be errors in the script that have gone undetected. Please consider this when interpreting information found in this chart.       NICOLAS Oates D.P.M., F.A.C.F.A.S.        Again, thank you for allowing me to participate in the care of your patient.        Sincerely,        Mg Oates DPM    Electronically signed

## 2025-05-02 NOTE — PROGRESS NOTES
PATIENT HISTORY:  Bora Monroy is a 65 year old male who presents to clinic in consultation at the request of  Yakov Jones M.D. with a chief complaint of right heel lump.  The patient is seen by themselves.  The patient relates the pain is primarily located around the back of the heel.  Reports insidious onset without acute precipitating event.  The patient relates that the symptoms have been going on for several month(s).  The patient has previously tried different shoes, Therapy, Ice, Ibuprofen and pain rubs.  with little relief.  The patient is  going to retire in 3 months .  Any previous notes and studies that pertain to the patient's condition were reviewed.    Pertinent medical, surgical and family history was reviewed in the [facility] chart.    Past Medical History:   Past Medical History:   Diagnosis Date    Hyperlipidemia LDL goal < 130     declines statins -- favorable CAC in 2/18    Hypertension goal BP (blood pressure) < 140/90 11/17/2015    NL stress test 4/1/16, med started 11/16    Impaired fasting glucose     Kidney stone 12/8/2016    Seropositive rheumatoid arthritis of multiple sites (H) 11/17/2015       Medications:   Current Outpatient Medications:     amLODIPine (NORVASC) 5 MG tablet, Take 1 tablet (5 mg) by mouth daily., Disp: 90 tablet, Rfl: 3    Cholecalciferol (VITAMIN D PO), Take 1,000 Units by mouth, Disp: , Rfl:     cinnamon 500 MG TABS, Take 1 tablet by mouth daily , Disp: , Rfl:     FISH OIL 1000 MG OR CAPS, 1 tablet daily, Disp: , Rfl:     glucosamine-chondroitin 500-400 MG CAPS per capsule, Take 1 capsule by mouth daily, Disp: , Rfl:     KRILL OIL PO, Take 1 tablet by mouth daily, Disp: , Rfl:     leflunomide (ARAVA) 20 MG tablet, Take 1 tablet (20 mg) by mouth every other day. .  Labs required every 3 months., Disp: 45 tablet, Rfl: 2    losartan (COZAAR) 50 MG tablet, Take 1 tablet (50 mg) by mouth daily., Disp: 90 tablet, Rfl: 3    Misc Natural Products (TURMERIC CURCUMIN) CAPS,  Take by mouth daily, Disp: , Rfl:     MULTI-VITAMIN OR TABS, 1 TABLET DAILY, Disp: , Rfl:     psyllium (METAMUCIL) 30.9 % POWD, Take 1 tsp by mouth 2 times daily., Disp: , Rfl:     simvastatin (ZOCOR) 20 MG tablet, Take 1 tablet (20 mg) by mouth at bedtime., Disp: 90 tablet, Rfl: 3    triamcinolone (KENALOG) 0.1 % external cream, Apply sparingly to affected area two to three times daily as needed for hand rash, Disp: 60 g, Rfl: 2    VITAMIN C OR, None Entered, Disp: , Rfl:     VITAMIN E PO, , Disp: , Rfl:      Allergies:    Allergies   Allergen Reactions    Wool Fiber     Azithromycin Diarrhea         LOWER EXTREMITY PHYSICAL EXAM    Dermatologic: Skin is intact to {RIGHT /LEFT:596625} lower extremity without significant lesions, rash or abrasion.        Vascular: DP & PT pulses are intact & regular on the {RIGHT /LEFT:272586}.   CFT and skin temperature is normal to the {RIGHT /LEFT:263796} lower extremity.     Neurologic: Lower extremity sensation is intact to light touch.  No evidence of weakness in the {RIGHT /LEFT:805066} lower extremity.        Musculoskeletal: Patient is ambulatory without assistive device or brace.  No gross ankle deformity noted.  No foot or ankle joint effusion is noted.  Noted ***    Diagnostics:  Radiographs included three views of the {RIGHT LEFT BOTH NO:373747} foot demonstrating *** no cortical erosions or periosteal elevation.  All joint margins appear stable.  There is no apparent fracture or tumor formation noted.  There is no evidence of foreign body.  The images were independently reviewed by myself along with the patient explaining the findings.      ASSESSMENT / PLAN:   No diagnosis found.    I have explained to Bora about the conditions.  We discussed the underlying contributing factors to the condition as well as both conservative and surgical treatment options along with expected length of recovery.  At this time, ***    Bora verbalized agreement with and understanding  of the rational for the diagnosis and treatment plan.  All questions were answered to best of my ability and the patient's satisfaction. The patient was advised to contact the clinic with any questions that may arise after the clinic visit.      Disclaimer: This note consists of symbols derived from keyboarding, dictation and/or voice recognition software. As a result, there may be errors in the script that have gone undetected. Please consider this when interpreting information found in this chart.       NICOLAS Oates D.P.M., F.PER.C.F.A.S.

## 2025-05-02 NOTE — PATIENT INSTRUCTIONS

## 2025-05-02 NOTE — NURSING NOTE
Chief Complaint   Patient presents with    Consult     Right heel pain- bump       Initial Ht 1.829 m (6')   Wt 74.8 kg (165 lb)   BMI 22.38 kg/m   Estimated body mass index is 22.38 kg/m  as calculated from the following:    Height as of this encounter: 1.829 m (6').    Weight as of this encounter: 74.8 kg (165 lb).  Medications and allergies reviewed.      Shazia VILLAVICENCIO MA

## 2025-05-03 ENCOUNTER — HEALTH MAINTENANCE LETTER (OUTPATIENT)
Age: 65
End: 2025-05-03

## 2025-06-02 ENCOUNTER — LAB (OUTPATIENT)
Dept: LAB | Facility: CLINIC | Age: 65
End: 2025-06-02
Payer: COMMERCIAL

## 2025-06-02 DIAGNOSIS — M05.79 SEROPOSITIVE RHEUMATOID ARTHRITIS OF MULTIPLE SITES (H): ICD-10-CM

## 2025-06-02 DIAGNOSIS — Z79.899 HIGH RISK MEDICATION USE: ICD-10-CM

## 2025-06-02 LAB
BASOPHILS # BLD AUTO: 0 10E3/UL (ref 0–0.2)
BASOPHILS NFR BLD AUTO: 1 %
EOSINOPHIL # BLD AUTO: 0.1 10E3/UL (ref 0–0.7)
EOSINOPHIL NFR BLD AUTO: 1 %
ERYTHROCYTE [DISTWIDTH] IN BLOOD BY AUTOMATED COUNT: 12.4 % (ref 10–15)
ERYTHROCYTE [SEDIMENTATION RATE] IN BLOOD BY WESTERGREN METHOD: 5 MM/HR (ref 0–20)
HCT VFR BLD AUTO: 39.3 % (ref 40–53)
HGB BLD-MCNC: 13.4 G/DL (ref 13.3–17.7)
IMM GRANULOCYTES # BLD: 0 10E3/UL
IMM GRANULOCYTES NFR BLD: 0 %
LYMPHOCYTES # BLD AUTO: 1.6 10E3/UL (ref 0.8–5.3)
LYMPHOCYTES NFR BLD AUTO: 31 %
MCH RBC QN AUTO: 32.3 PG (ref 26.5–33)
MCHC RBC AUTO-ENTMCNC: 34.1 G/DL (ref 31.5–36.5)
MCV RBC AUTO: 95 FL (ref 78–100)
MONOCYTES # BLD AUTO: 0.4 10E3/UL (ref 0–1.3)
MONOCYTES NFR BLD AUTO: 8 %
NEUTROPHILS # BLD AUTO: 3.1 10E3/UL (ref 1.6–8.3)
NEUTROPHILS NFR BLD AUTO: 60 %
PLATELET # BLD AUTO: 196 10E3/UL (ref 150–450)
RBC # BLD AUTO: 4.15 10E6/UL (ref 4.4–5.9)
WBC # BLD AUTO: 5.2 10E3/UL (ref 4–11)

## 2025-06-02 PROCEDURE — 82565 ASSAY OF CREATININE: CPT

## 2025-06-02 PROCEDURE — 36415 COLL VENOUS BLD VENIPUNCTURE: CPT

## 2025-06-02 PROCEDURE — 85652 RBC SED RATE AUTOMATED: CPT

## 2025-06-02 PROCEDURE — 80076 HEPATIC FUNCTION PANEL: CPT

## 2025-06-02 PROCEDURE — 85025 COMPLETE CBC W/AUTO DIFF WBC: CPT

## 2025-06-02 PROCEDURE — 86140 C-REACTIVE PROTEIN: CPT

## 2025-06-03 LAB
ALBUMIN SERPL BCG-MCNC: 4.5 G/DL (ref 3.5–5.2)
ALP SERPL-CCNC: 85 U/L (ref 40–150)
ALT SERPL W P-5'-P-CCNC: 74 U/L (ref 0–70)
AST SERPL W P-5'-P-CCNC: 34 U/L (ref 0–45)
BILIRUB SERPL-MCNC: 0.3 MG/DL
BILIRUBIN DIRECT (ROCHE PRO & PURE): 0.11 MG/DL (ref 0–0.45)
CREAT SERPL-MCNC: 0.99 MG/DL (ref 0.67–1.17)
CRP SERPL-MCNC: <3 MG/L
EGFRCR SERPLBLD CKD-EPI 2021: 85 ML/MIN/1.73M2
PROT SERPL-MCNC: 6.5 G/DL (ref 6.4–8.3)

## 2025-06-05 ENCOUNTER — OFFICE VISIT (OUTPATIENT)
Dept: RHEUMATOLOGY | Facility: CLINIC | Age: 65
End: 2025-06-05
Payer: COMMERCIAL

## 2025-06-05 VITALS
DIASTOLIC BLOOD PRESSURE: 83 MMHG | HEART RATE: 58 BPM | OXYGEN SATURATION: 97 % | SYSTOLIC BLOOD PRESSURE: 138 MMHG | RESPIRATION RATE: 18 BRPM

## 2025-06-05 DIAGNOSIS — R74.01 ELEVATED ALT MEASUREMENT: ICD-10-CM

## 2025-06-05 DIAGNOSIS — Z79.899 HIGH RISK MEDICATION USE: ICD-10-CM

## 2025-06-05 DIAGNOSIS — M05.79 SEROPOSITIVE RHEUMATOID ARTHRITIS OF MULTIPLE SITES (H): Primary | ICD-10-CM

## 2025-06-05 RX ORDER — LEFLUNOMIDE 20 MG/1
20 TABLET ORAL EVERY OTHER DAY
Qty: 45 TABLET | Refills: 2 | Status: SHIPPED | OUTPATIENT
Start: 2025-06-05
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
## 2025-06-05 NOTE — PATIENT INSTRUCTIONS
RHEUMATOLOGY    [facility] [place]  [address] [place] [address]  [place] [address]    Phone number: [phone]  Fax number: [phone]    If you need a medication refill, please contact us as you may need lab work and/or a follow up visit prior to your refill.      Thank you for choosing [facility]!    Shonna Kenny CMA Rheumatology

## 2025-06-05 NOTE — NURSING NOTE
RAPID3 (0-30) Cumulative Score  1.5          RAPID3 Weighted Score (divide #4 by 3 and that is the weighted score)  0.5

## 2025-06-19 PROBLEM — M25.512 SHOULDER PAIN, LEFT: Status: RESOLVED | Noted: 2023-01-25 | Resolved: 2025-06-19

## 2025-06-23 ENCOUNTER — LAB (OUTPATIENT)
Dept: LAB | Facility: CLINIC | Age: 65
End: 2025-06-23
Payer: COMMERCIAL

## 2025-06-23 DIAGNOSIS — R74.01 ELEVATED ALT MEASUREMENT: ICD-10-CM

## 2025-06-23 LAB — ALT SERPL W P-5'-P-CCNC: 54 U/L (ref 0–70)

## 2025-06-23 PROCEDURE — 36415 COLL VENOUS BLD VENIPUNCTURE: CPT

## 2025-06-23 PROCEDURE — 84460 ALANINE AMINO (ALT) (SGPT): CPT

## 2025-06-25 ENCOUNTER — RESULTS FOLLOW-UP (OUTPATIENT)
Dept: RHEUMATOLOGY | Facility: CLINIC | Age: 65
End: 2025-06-25